# Patient Record
Sex: MALE | Race: WHITE | NOT HISPANIC OR LATINO | Employment: OTHER | URBAN - NONMETROPOLITAN AREA
[De-identification: names, ages, dates, MRNs, and addresses within clinical notes are randomized per-mention and may not be internally consistent; named-entity substitution may affect disease eponyms.]

---

## 2017-09-14 DIAGNOSIS — N40.1 BENIGN NON-NODULAR PROSTATIC HYPERPLASIA WITH LOWER URINARY TRACT SYMPTOMS: ICD-10-CM

## 2017-09-14 RX ORDER — TAMSULOSIN HYDROCHLORIDE 0.4 MG/1
CAPSULE ORAL
Qty: 30 CAPSULE | Refills: 11 | Status: SHIPPED | OUTPATIENT
Start: 2017-09-14 | End: 2017-11-21 | Stop reason: SDUPTHER

## 2017-10-25 ENCOUNTER — OFFICE VISIT (OUTPATIENT)
Dept: UROLOGY | Facility: CLINIC | Age: 67
End: 2017-10-25

## 2017-10-25 VITALS
DIASTOLIC BLOOD PRESSURE: 96 MMHG | TEMPERATURE: 98.1 F | BODY MASS INDEX: 37.8 KG/M2 | WEIGHT: 304 LBS | SYSTOLIC BLOOD PRESSURE: 152 MMHG | HEIGHT: 75 IN

## 2017-10-25 DIAGNOSIS — R35.1 NOCTURIA: ICD-10-CM

## 2017-10-25 DIAGNOSIS — N40.1 BENIGN NON-NODULAR PROSTATIC HYPERPLASIA WITH LOWER URINARY TRACT SYMPTOMS: Primary | ICD-10-CM

## 2017-10-25 LAB
BILIRUB BLD-MCNC: NEGATIVE MG/DL
CLARITY, POC: CLEAR
COLOR UR: YELLOW
GLUCOSE UR STRIP-MCNC: NEGATIVE MG/DL
KETONES UR QL: NEGATIVE
LEUKOCYTE EST, POC: NEGATIVE
NITRITE UR-MCNC: NEGATIVE MG/ML
PH UR: 6.5 [PH] (ref 5–8)
PROT UR STRIP-MCNC: NEGATIVE MG/DL
RBC # UR STRIP: NEGATIVE /UL
SP GR UR: 1.02 (ref 1–1.03)
UROBILINOGEN UR QL: NORMAL

## 2017-10-25 PROCEDURE — 99213 OFFICE O/P EST LOW 20 MIN: CPT | Performed by: UROLOGY

## 2017-10-25 PROCEDURE — 81003 URINALYSIS AUTO W/O SCOPE: CPT | Performed by: UROLOGY

## 2017-10-25 NOTE — PROGRESS NOTES
Mr. Auguste is 67 y.o. male    CHIEF COMPLAINT: I am here to follow up on my BPH.     HPI  Benign Prostatic hyperplasia  Patient was initially diagnosed with benign prostatic hyperplasia approximately 7 years ago. This was identified in the context of worsening obstructive and irritative voiding symptoms. Severity of the diease would be  Moderate now due to worsening of the nocturia mostly. . This has been managed with Alpha blockers. Alpha blockers have little to no effect on  the symptoms. His disease has been complicated by no complicating factors. His most bothersome symptom(s) is/are nocturia up to five times, intermittency, post void dribbling, and weak stream.       The following portions of the patient's history were reviewed and updated as appropriate: allergies, current medications, past family history, past medical history, past social history, past surgical history and problem list.    Review of Systems   Constitutional: Negative for chills and fever.   Gastrointestinal: Negative for abdominal pain, anal bleeding and blood in stool.   Genitourinary: Negative for flank pain and hematuria.         Current Outpatient Prescriptions:   •  Cod Liver Oil 1000 MG capsule, Take  by mouth Daily., Disp: , Rfl:   •  lisinopril (PRINIVIL,ZESTRIL) 10 MG tablet, Take 10 mg by mouth Daily., Disp: , Rfl:   •  metFORMIN (GLUCOPHAGE) 500 MG tablet, Take 500 mg by mouth 2 (Two) Times a Day With Meals., Disp: , Rfl:   •  simvastatin (ZOCOR) 20 MG tablet, Take 20 mg by mouth Every Night., Disp: , Rfl:   •  tamsulosin (FLOMAX) 0.4 MG capsule 24 hr capsule, TAKE ONE CAPSULE BY MOUTH ONCE DAILY, Disp: 30 capsule, Rfl: 11    Past Medical History:   Diagnosis Date   • Diabetes mellitus        Past Surgical History:   Procedure Laterality Date   • HERNIA REPAIR         Social History     Social History   • Marital status:      Spouse name: N/A   • Number of children: N/A   • Years of education: N/A     Social History Main  "Topics   • Smoking status: Former Smoker   • Smokeless tobacco: None   • Alcohol use No   • Drug use: None   • Sexual activity: Not Asked     Other Topics Concern   • None     Social History Narrative       Family History   Problem Relation Age of Onset   • No Known Problems Father    • No Known Problems Mother        /96  Temp 98.1 °F (36.7 °C)  Ht 75\" (190.5 cm)  Wt (!) 304 lb (138 kg)  BMI 38 kg/m2    Physical Exam  Alert and oriented ×3  Not agitated or distressed  No obvious deformities  No respiratory distress  Skin without pallor or diaphoresis  SHILPA: Benign feeling prostate without nodule approximately 30 mL in size.   Penis and testicles are normal         Results for orders placed or performed in visit on 10/25/17   POC Urinalysis Dipstick, Automated   Result Value Ref Range    Color Yellow Yellow, Straw, Dark Yellow, Amy    Clarity, UA Clear Clear    Glucose, UA Negative Negative, 1000 mg/dL (3+) mg/dL    Bilirubin Negative Negative    Ketones, UA Negative Negative    Specific Gravity  1.025 1.005 - 1.030    Blood, UA Negative Negative    pH, Urine 6.5 5.0 - 8.0    Protein, POC Negative Negative mg/dL    Urobilinogen, UA Normal Normal    Leukocytes Negative Negative    Nitrite, UA Negative Negative       Assessment and Plan  Diagnoses and all orders for this visit:    Benign non-nodular prostatic hyperplasia with lower urinary tract symptoms  -     POC Urinalysis Dipstick, Automated    Nocturia    #1. It is explained the benign prostatic hyperplasia is a chronic urologic condition.  His voiding symptoms are stable on his current regimen. He has remained infection free since his last visit. He has no evidence of progression. We discussed symptoms that would be worrisome of either of these. We talked about the importance of being seen if he develops gross hematuria, burning with urination, or episodes that may be worrisome for inability to empty the bladder. We talked about medications that may " increase the risk of urinary retention especially over the counter decongestants. This chronic issue appears stable overall.   #2. Nocturia is explained to the patient is a manifestation of one the following or a combination of voiding dysfunction, other medical conditions, or a sleep disorder.  Nocturia as a completely isolated symptommore often represents a non-urologic problem or medical condition.  It is explained that as patient's age, they often have a reverse Circadian rhythm voiding pattern in which up to two thirds of the urine in a 24-hour period can be excreted at night.  We also went over sleep disorders of the patient which may affect them.  Volume-related disorders the cause mobilization of peripheral edema are also possible causes of nocturia including the medications used to treat them.  Therefore, treatment must be directed at the cause of the symptom.  I explained we will do our best to evaluate any urologic cause of symptoms, but oftentimes we find no abnormality in voiding dysfunction to correct.  Evaluation options are explained to the patient.   #3. I am concerned that this worsening nocturia could be from obstructive sleep apnea. I told him to discuss this with Dr. Kinsey.       Camron Bowden MD  10/25/17  10:32 AM      Cc: Feliciano Kinsey MD

## 2017-11-21 ENCOUNTER — TELEPHONE (OUTPATIENT)
Dept: UROLOGY | Facility: CLINIC | Age: 67
End: 2017-11-21

## 2017-11-21 DIAGNOSIS — N40.1 BENIGN NON-NODULAR PROSTATIC HYPERPLASIA WITH LOWER URINARY TRACT SYMPTOMS: ICD-10-CM

## 2017-11-21 RX ORDER — TAMSULOSIN HYDROCHLORIDE 0.4 MG/1
1 CAPSULE ORAL DAILY
Qty: 90 CAPSULE | Refills: 3 | Status: SHIPPED | OUTPATIENT
Start: 2017-11-21 | End: 2018-11-28 | Stop reason: SDUPTHER

## 2017-11-21 NOTE — TELEPHONE ENCOUNTER
Pt has script for Tamsulosin for 30 day supply and would like it to be written for 90 day supply instead if that is possible.

## 2018-10-08 NOTE — PROGRESS NOTES
Mr. Auguste is 68 y.o. male    CHIEF COMPLAINT: I am here for follow up on BPH.     HPI  I have followed this patient for about 8 years of benign enlargement of the prostate gland. His symptom score is 8 indicating moderate severity. Quality of life assessment is rated as 2=mostly satisfied. Symptoms include weak stream, nocturia with occasional urgency and frequency. Alpha blockers have only slightly improved this.  The nocturia is the most bothersome thing to him.  He has started to use CPAP at night done somewhat better since he started this.    The following portions of the patient's history were reviewed and updated as appropriate: allergies, current medications, past family history, past medical history, past social history, past surgical history and problem list.      Review of Systems   Constitutional: Negative for chills and fever.   Gastrointestinal: Negative for abdominal pain, anal bleeding and blood in stool.   Genitourinary: Negative for dysuria, frequency, hematuria and urgency.       Current Outpatient Prescriptions:   •  Cod Liver Oil 1000 MG capsule, Take  by mouth Daily., Disp: , Rfl:   •  lisinopril (PRINIVIL,ZESTRIL) 10 MG tablet, Take 10 mg by mouth Daily., Disp: , Rfl:   •  metFORMIN (GLUCOPHAGE) 500 MG tablet, Take 500 mg by mouth 2 (Two) Times a Day With Meals., Disp: , Rfl:   •  simvastatin (ZOCOR) 20 MG tablet, Take 20 mg by mouth Every Night., Disp: , Rfl:   •  tamsulosin (FLOMAX) 0.4 MG capsule 24 hr capsule, Take 1 capsule by mouth Daily., Disp: 90 capsule, Rfl: 3    Past Medical History:   Diagnosis Date   • Diabetes mellitus (CMS/HCC)        Past Surgical History:   Procedure Laterality Date   • HERNIA REPAIR         Social History     Social History   • Marital status:      Social History Main Topics   • Smoking status: Former Smoker   • Smokeless tobacco: Never Used   • Alcohol use No   • Drug use: Unknown     Other Topics Concern   • Not on file       Family History  "  Problem Relation Age of Onset   • No Known Problems Father    • No Known Problems Mother          /70   Temp 97.2 °F (36.2 °C)   Ht 190.5 cm (75\")   Wt 134 kg (295 lb)   BMI 36.87 kg/m²       Physical Exam  Alert and oriented ×3  Not agitated or distressed  No obvious deformities  No respiratory distress  Skin without pallor or diaphoresis  SHILPA: Benign feeling prostate without nodule approximately 35 mL in size.   Penis and testicles are normal   Vital signs are reviewed      Data  Results for orders placed or performed in visit on 10/15/18   POC Urinalysis Dipstick, Multipro   Result Value Ref Range    Color Yellow Yellow, Straw, Dark Yellow, Amy    Clarity, UA Clear Clear    Glucose, UA Negative Negative, 1000 mg/dL (3+) mg/dL    Bilirubin Negative Negative    Ketones, UA Negative Negative    Specific Gravity  1.020 1.005 - 1.030    Blood, UA Negative Negative    pH, Urine 6.0 5.0 - 8.0    Protein, POC Negative Negative mg/dL    Urobilinogen, UA Normal Normal    Nitrite, UA Negative Negative    Leukocytes Negative Negative     International Prostate Symptom Score  The following is posted based on patient questionnaire answers:  0 - not at all    1-7 mild symptoms  1- Less than one time in five  8-19 moderate symptoms  2 -Less than half the time  20-35 severe symptoms  3 - About half the time  4 - More than half the time  5 - Almost always     For following sections:  Incomplete Emptying: - How often have you had the sensation  of not emptying your bladder completely after you finished urinating?  1  Frequency: -How often have you had to urinate again less than   two hours after you finished urinating?      1  Intermittency: -How often have you found you stopped and started again  Several times when you urinate?       0  Urgency: -How often do you find it difficult to postpone urination?             1  Weak stream: - How often have you had a weak urinary stream?             3  Straining: - How often " have you had to push or strain to begin  Urination?          0  Sleeping: -How many times did you most typically get up to urinate   From the time you went to bed at night until the time you got up in the   2  Morning          Total `  8    Quality of Life  How would you feel if you had to live with your urinary condition the way   2  It is now, no better, no worse for the rest of your life?    Where: 0=delighted; 1= pleased, 2= mostly satisfied, 3= mixed, 4 = mostly  Dissatisfied, 5= Unhappy, 6 = terrible    Bladder Scan interpretation  Estimation of residual urine via abdominal ultrasound  Residual Urine: 63 ml  Indication: BPH  Position: Supine  Examination: Incremental scanning of the suprapubic area using 3 MHz transducer using copious amounts of acoustic gel.   Findings: An anechoic area was demonstrated which represented the bladder, with measurement of residual urine as noted. I inspected this myself. In that the residual urine was stable or insignificant, no treatment will be necessary at this time.     Assessment and Plan  Diagnoses and all orders for this visit:    Benign non-nodular prostatic hyperplasia with lower urinary tract symptoms  -     POC Urinalysis Dipstick, Multipro    Nocturia    - It is explained the benign prostatic hyperplasia is a chronic urologic condition.  His voiding symptoms are stable on his current regimen. He has remained infection free since his last visit. He has no evidence of progression. We discussed symptoms that would be worrisome of either of these. We talked about the importance of being seen if he develops gross hematuria, burning with urination, or episodes that may be worrisome for inability to empty the bladder. We talked about medications that may increase the risk of urinary retention especially over the counter decongestants. This chronic issue appears stable overall. We will continue to monitor this with history, exam, urinalysis. Any suggestion of progression  will require further work-up.     - His nocturia is stable. Perhaps, slightly improved since using CPAP.       (Please note that portions of this note were completed with a voice recognition program.)  Camron Bowden MD  10/15/18  8:47 AM      Cc: Feliciano Kinsey MD

## 2018-10-15 ENCOUNTER — OFFICE VISIT (OUTPATIENT)
Dept: UROLOGY | Facility: CLINIC | Age: 68
End: 2018-10-15

## 2018-10-15 ENCOUNTER — OFFICE VISIT (OUTPATIENT)
Dept: RETAIL CLINIC | Facility: CLINIC | Age: 68
End: 2018-10-15

## 2018-10-15 VITALS
DIASTOLIC BLOOD PRESSURE: 70 MMHG | TEMPERATURE: 97.2 F | SYSTOLIC BLOOD PRESSURE: 130 MMHG | BODY MASS INDEX: 36.68 KG/M2 | HEIGHT: 75 IN | WEIGHT: 295 LBS

## 2018-10-15 DIAGNOSIS — R35.1 NOCTURIA: ICD-10-CM

## 2018-10-15 DIAGNOSIS — N40.1 BENIGN NON-NODULAR PROSTATIC HYPERPLASIA WITH LOWER URINARY TRACT SYMPTOMS: Primary | ICD-10-CM

## 2018-10-15 DIAGNOSIS — Z23 FLU VACCINE NEED: Primary | ICD-10-CM

## 2018-10-15 LAB
BILIRUB BLD-MCNC: NEGATIVE MG/DL
CLARITY, POC: CLEAR
COLOR UR: YELLOW
GLUCOSE UR STRIP-MCNC: NEGATIVE MG/DL
KETONES UR QL: NEGATIVE
LEUKOCYTE EST, POC: NEGATIVE
NITRITE UR-MCNC: NEGATIVE MG/ML
PH UR: 6 [PH] (ref 5–8)
PROT UR STRIP-MCNC: NEGATIVE MG/DL
RBC # UR STRIP: NEGATIVE /UL
SP GR UR: 1.02 (ref 1–1.03)
UROBILINOGEN UR QL: NORMAL

## 2018-10-15 PROCEDURE — 99213 OFFICE O/P EST LOW 20 MIN: CPT | Performed by: UROLOGY

## 2018-10-15 PROCEDURE — 81001 URINALYSIS AUTO W/SCOPE: CPT | Performed by: UROLOGY

## 2018-10-15 PROCEDURE — 51798 US URINE CAPACITY MEASURE: CPT | Performed by: UROLOGY

## 2018-10-15 NOTE — PATIENT INSTRUCTIONS

## 2018-10-15 NOTE — PROGRESS NOTES
Patient presents to clinic for seasonal influenza vaccination. There are no verbalized contraindications including allergy to eggs, latex, mercury or other flu vaccine components. Denies previous vaccine reaction, current illness or fever.      Consent discussed and signed. VIS given. Vaccination administered. Patient tolerated well. See scanned documents.     Humberto was seen today for immunizations.    Diagnoses and all orders for this visit:    Flu vaccine need    Other orders  -     Flu Vaccine High Dose PF 65YR+ ([id number])      Dorothy Gunderson, APRN

## 2018-10-31 ENCOUNTER — TELEPHONE (OUTPATIENT)
Dept: UROLOGY | Facility: CLINIC | Age: 68
End: 2018-10-31

## 2018-10-31 DIAGNOSIS — N52.9 ERECTILE DYSFUNCTION, UNSPECIFIED ERECTILE DYSFUNCTION TYPE: Primary | ICD-10-CM

## 2018-10-31 RX ORDER — SILDENAFIL CITRATE 20 MG/1
TABLET ORAL
Qty: 30 TABLET | Refills: 11 | Status: SHIPPED | OUTPATIENT
Start: 2018-10-31 | End: 2019-11-26 | Stop reason: SDUPTHER

## 2018-11-28 DIAGNOSIS — N40.1 BENIGN NON-NODULAR PROSTATIC HYPERPLASIA WITH LOWER URINARY TRACT SYMPTOMS: ICD-10-CM

## 2018-11-29 RX ORDER — TAMSULOSIN HYDROCHLORIDE 0.4 MG/1
CAPSULE ORAL
Qty: 90 CAPSULE | Refills: 3 | Status: SHIPPED | OUTPATIENT
Start: 2018-11-29 | End: 2019-12-08 | Stop reason: SDUPTHER

## 2019-01-07 ENCOUNTER — HOSPITAL ENCOUNTER (OUTPATIENT)
Dept: GENERAL RADIOLOGY | Facility: HOSPITAL | Age: 69
Discharge: HOME | End: 2019-01-07
Admitting: NURSE PRACTITIONER

## 2019-01-07 ENCOUNTER — OFFICE VISIT (OUTPATIENT)
Dept: RETAIL CLINIC | Facility: CLINIC | Age: 69
End: 2019-01-07

## 2019-01-07 VITALS
OXYGEN SATURATION: 90 % | BODY MASS INDEX: 36.18 KG/M2 | TEMPERATURE: 99.3 F | SYSTOLIC BLOOD PRESSURE: 108 MMHG | RESPIRATION RATE: 22 BRPM | WEIGHT: 291 LBS | HEIGHT: 75 IN | HEART RATE: 92 BPM | DIASTOLIC BLOOD PRESSURE: 52 MMHG

## 2019-01-07 DIAGNOSIS — R06.02 SHORTNESS OF BREATH: ICD-10-CM

## 2019-01-07 DIAGNOSIS — R09.02 HYPOXIA: ICD-10-CM

## 2019-01-07 DIAGNOSIS — I49.9 CARDIAC ARRHYTHMIA, UNSPECIFIED CARDIAC ARRHYTHMIA TYPE: Primary | ICD-10-CM

## 2019-01-07 PROCEDURE — 71046 X-RAY EXAM CHEST 2 VIEWS: CPT

## 2019-01-07 PROCEDURE — 99213 OFFICE O/P EST LOW 20 MIN: CPT | Performed by: NURSE PRACTITIONER

## 2019-01-07 RX ORDER — ALBUTEROL SULFATE 2.5 MG/3ML
2.5 SOLUTION RESPIRATORY (INHALATION) ONCE
Status: DISCONTINUED | OUTPATIENT
Start: 2019-01-07 | End: 2019-01-07

## 2019-01-07 RX ORDER — FUROSEMIDE 40 MG/1
40 TABLET ORAL DAILY PRN
COMMUNITY
Start: 2018-12-13

## 2019-01-07 NOTE — PROGRESS NOTES
Chief Complaint   Patient presents with   • Sinus Problem     Subjective   Humberto Auguste Jr. is a 68 y.o. male who presents to the clinic today with complaints sinusitis. He has been sick x 7-10 days.   Sinus Problem   This is a new problem. The current episode started in the past 7 days. The problem has been gradually worsening since onset. There has been no fever. The pain is moderate. Associated symptoms include congestion, coughing, sinus pressure and a sore throat.         Current Outpatient Medications:   •  furosemide (LASIX) 40 MG tablet, TAKE 1 TABLET BY MOUTH ONCE DAILY, Disp: , Rfl:   •  lisinopril (PRINIVIL,ZESTRIL) 10 MG tablet, Take 10 mg by mouth Daily., Disp: , Rfl:   •  metFORMIN (GLUCOPHAGE) 500 MG tablet, Take 500 mg by mouth 2 (Two) Times a Day With Meals., Disp: , Rfl:   •  simvastatin (ZOCOR) 20 MG tablet, Take 20 mg by mouth Every Night., Disp: , Rfl:   •  tamsulosin (FLOMAX) 0.4 MG capsule 24 hr capsule, TAKE ONE CAPSULE BY MOUTH ONCE DAILY, Disp: 90 capsule, Rfl: 3  •  Cod Liver Oil 1000 MG capsule, Take  by mouth., Disp: , Rfl:   •  sildenafil (REVATIO) 20 MG tablet, Take 1-4 tablets 1-2 hours before sexual activity PRN, Disp: 30 tablet, Rfl: 11    Allergies:  Patient has no known allergies.    Past Medical History:   Diagnosis Date   • Diabetes mellitus (CMS/HCC)    • Elevated cholesterol    • Hypertension    • Sleep apnea      Past Surgical History:   Procedure Laterality Date   • HERNIA REPAIR       Family History   Problem Relation Age of Onset   • No Known Problems Father    • No Known Problems Mother      Social History     Tobacco Use   • Smoking status: Former Smoker   • Smokeless tobacco: Never Used   Substance Use Topics   • Alcohol use: No   • Drug use: Not on file       Review of Systems  Review of Systems   HENT: Positive for congestion, sinus pressure and sore throat.    Respiratory: Positive for cough.        Objective   Pulse 74   Temp 99.3 °F (37.4 °C) (Tympanic)    "Resp 22   Ht 190.5 cm (75\")   Wt 132 kg (291 lb)   SpO2 (!) 88%   BMI 36.37 kg/m²       Physical Exam   Constitutional: He is oriented to person, place, and time. He appears well-developed and well-nourished.   HENT:   Head: Normocephalic and atraumatic.   Cardiovascular: An irregularly irregular rhythm present. PMI is not displaced.   Pulmonary/Chest: He is in respiratory distress (mildly short of breath).   Diminished breath sounds in bases   Neurological: He is alert and oriented to person, place, and time.   Skin: Skin is warm. He is diaphoretic.   Psychiatric: He has a normal mood and affect.   Vitals reviewed.      Assessment/Plan     Humberto was seen today for sinus problem.    Diagnoses and all orders for this visit:    Cardiac arrhythmia, unspecified cardiac arrhythmia type    Hypoxia      During our conversation Mr. Auguste became diaphoretic and short of breath. He his breath sounds were diminished. His O2 dropped to 90%. I gave him a nebulizer treatment for which he took only a few inhalations and stopped. His O2 sats dropped to 88% with irregular heart rate. At this time I ask him to go to emergency room which he refused. He said he would go to see Dr. Brandon Kinsey, his pcp. He says Dr. Brandon Kinsey has worked him up for low O2 sats and atrial fibrillation and has found nothing. I called Dr. Kinsey's office and they are unable to get him in to see anyone today and suggested he go to Urgent Care. He called his wife back to the clinic. She was shopping. She reports he has become more diaphoretic recently, but thought it was due to the cold. Explained to them both her needs further workup and this cannot be done here. He is agreeable to going to Urgent Care. He and his wife decided to complete shopping before going.       "

## 2019-11-13 NOTE — PROGRESS NOTES
Mr. Auguste is 69 y.o. male    CHIEF COMPLAINT: I am here for my yearly follow up for BPH.     Benign Prostatic Hypertrophy   This is a chronic problem. The current episode started more than 1 year ago (2000). The problem is unchanged. Irritative symptoms include frequency, nocturia (x1; CPAP has greatly improved) and urgency. Obstructive symptoms include dribbling, incomplete emptying, a slower stream, straining and a weak stream. Obstructive symptoms do not include an intermittent stream. Pertinent negatives include no chills, dysuria, hematuria, hesitancy, nausea or vomiting. AUA score is 0-7 (7/35, bother score 2.  Most bothersome symptom is weak stream.). The symptoms are aggravated by diuretics. Past treatments include tamsulosin. The treatment provided mild relief.   Erectile Dysfunction   This is a chronic problem. The current episode started more than 1 year ago. The problem is unchanged. Irritative symptoms include frequency, nocturia (x1; CPAP has greatly improved) and urgency. Obstructive symptoms include dribbling, incomplete emptying, a slower stream, straining and a weak stream. Obstructive symptoms do not include an intermittent stream. Pertinent negatives include no chills, dysuria, hematuria, hesitancy or inability to urinate. Past treatments include sildenafil. The treatment provided mild relief. Risk factors include diabetes mellitus, hypertension and tobacco use.     The following portions of the patient's history were reviewed and updated as appropriate: allergies, current medications, past family history, past medical history, past social history, past surgical history and problem list.      Review of Systems   Constitutional: Negative.  Negative for chills and fever.   Gastrointestinal: Negative for abdominal distention, abdominal pain, blood in stool, nausea and vomiting.   Genitourinary: Positive for frequency, incomplete emptying, nocturia (x1; CPAP has greatly improved) and urgency.  "Negative for difficulty urinating, dysuria, flank pain, hematuria and hesitancy.   Psychiatric/Behavioral: Negative.  Negative for agitation and confusion.         Current Outpatient Medications:   •  Cod Liver Oil 1000 MG capsule, Take  by mouth., Disp: , Rfl:   •  furosemide (LASIX) 40 MG tablet, TAKE 1 TABLET BY MOUTH ONCE DAILY, Disp: , Rfl:   •  lisinopril (PRINIVIL,ZESTRIL) 10 MG tablet, Take 10 mg by mouth Daily., Disp: , Rfl:   •  metFORMIN (GLUCOPHAGE) 500 MG tablet, Take 500 mg by mouth 2 (Two) Times a Day With Meals., Disp: , Rfl:   •  sildenafil (REVATIO) 20 MG tablet, Take 1-4 tablets 1-2 hours before sexual activity PRN, Disp: 30 tablet, Rfl: 11  •  simvastatin (ZOCOR) 20 MG tablet, Take 20 mg by mouth Every Night., Disp: , Rfl:   •  tamsulosin (FLOMAX) 0.4 MG capsule 24 hr capsule, TAKE ONE CAPSULE BY MOUTH ONCE DAILY, Disp: 90 capsule, Rfl: 3  •  montelukast (SINGULAIR) 10 MG tablet, Take 1 tablet by mouth Every Night., Disp: 30 tablet, Rfl: 0    Past Medical History:   Diagnosis Date   • Diabetes mellitus (CMS/HCC)    • Elevated cholesterol    • Hypertension    • Sleep apnea        Past Surgical History:   Procedure Laterality Date   • HERNIA REPAIR     • VARICOSE VEIN SURGERY         Social History     Socioeconomic History   • Marital status:      Spouse name: Not on file   • Number of children: Not on file   • Years of education: Not on file   • Highest education level: Not on file   Tobacco Use   • Smoking status: Former Smoker   • Smokeless tobacco: Never Used   Substance and Sexual Activity   • Alcohol use: No   • Drug use: No       Family History   Problem Relation Age of Onset   • No Known Problems Father    • No Known Problems Mother          Temp 98.6 °F (37 °C)   Ht 190.5 cm (75\")   Wt 132 kg (291 lb)   BMI 36.37 kg/m²       Physical Exam   Constitutional: He is oriented to person, place, and time. He appears well-developed and well-nourished.   HENT:   Head: Normocephalic. "   Pulmonary/Chest: Effort normal. No respiratory distress.   Abdominal: He exhibits no distension.   Genitourinary:   Genitourinary Comments: SHILPA: Prostate apex soft, no nodules, mildly enlarged   Musculoskeletal: He exhibits no edema.   Neurological: He is alert and oriented to person, place, and time.   Skin: Skin is warm and dry.   Psychiatric: He has a normal mood and affect. His behavior is normal.   Vitals reviewed.    Patient's Body mass index is 36.37 kg/m². BMI is above normal parameters. Recommendations include: educational material.    Data  Results for orders placed or performed in visit on 11/14/19   POC Urinalysis Dipstick, Multipro   Result Value Ref Range    Color Yellow Yellow, Straw, Dark Yellow, Amy    Clarity, UA Clear Clear    Glucose, UA Negative Negative, 1000 mg/dL (3+) mg/dL    Bilirubin Negative Negative    Ketones, UA Negative Negative    Specific Gravity  1.025 1.005 - 1.030    Blood, UA Negative Negative    pH, Urine 5.0 5.0 - 8.0    Protein, POC Negative Negative mg/dL    Urobilinogen, UA Normal Normal    Nitrite, UA Negative Negative    Leukocytes Negative Negative         Imaging Results (Last 7 Days)     ** No results found for the last 168 hours. **        International Prostate Symptom Score  The following is posted based on patient questionnaire answers:  0 - not at all    1-7 mild symptoms  1- Less than one time in five  8-19 moderate symptoms  2 -Less than half the time  20-35 severe symptoms  3 - About half the time  4 - More than half the time  5 - Almost always     For following sections:  Incomplete Emptying: - How often have you had the sensation  of not emptying your bladder completely after you finished urinating?  1  Frequency: -How often have you had to urinate again less than   two hours after you finished urinating?      1  Intermittency: -How often have you found you stopped and started again  Several times when you urinate?       0  Urgency: -How often do you find  it difficult to postpone urination?             1  Weak stream: - How often have you had a weak urinary stream?             2  Straining: - How often have you had to push or strain to begin  Urination?          1  Sleeping: -How many times did you most typically get up to urinate   From the time you went to bed at night until the time you got up in the   1  Morning          Total `  7    Quality of Life  How would you feel if you had to live with your urinary condition the way   7  It is now, no better, no worse for the rest of your life?    Where: 0=delighted; 1= pleased, 2= mostly satisfied, 3= mixed, 4 = mostly  Dissatisfied, 5= Unhappy, 6 = terrible    Bladder Scan interpretation  Estimation of residual urine via abdominal ultrasound  Residual Urine: 190 ml  Indication: BPH  Position: Supine  Examination: Incremental scanning of the suprapubic area using 3 MHz transducer using copious amounts of acoustic gel.   Findings: An anechoic area was demonstrated which represented the bladder, with measurement of residual urine as noted. I inspected this myself. In that the residual urine was stable or insignificant, no treatment will be necessary at this time.       Assessment and Plan  Humberto was seen today for benign prostatic hypertrophy and erectile dysfunction.    Diagnoses and all orders for this visit:    Benign non-nodular prostatic hyperplasia with lower urinary tract symptoms  -     POC Urinalysis Dipstick, Multipro    Erectile dysfunction, unspecified erectile dysfunction type  -     POC Urinalysis Dipstick, Multipro      Patient presents for follow-up of BPH and erectile dysfunction.  He is currently maintained on Flomax 0.4 mg and is tolerating this without side effects.  His postvoid residual today has increased from his previous visit from 63 mL to 190 mL.  He denies any significant changes in his voiding symptoms except near resolution of nocturia after initiating CPAP therapy.  SHILPA revealed no  significant abnormalities and patient reports his PCP will be obtaining his PSA in December.    Patient is maintained on sildenafil for erectile dysfunction and is tolerating this without adverse reactions.    As his postvoid residual has increased, I would like to see him back in 6 months for symptom check and bladder scan.  We did discuss the possibility of increasing his Flomax to 0.8 mg, however, we decided to reevaluate at follow up.    (Please note that portions of this note were completed with a voice recognition program.)  Lakesha Jorge, SULAIMAN  11/14/19  9:27 PM

## 2019-11-14 ENCOUNTER — OFFICE VISIT (OUTPATIENT)
Dept: UROLOGY | Facility: CLINIC | Age: 69
End: 2019-11-14

## 2019-11-14 VITALS — HEIGHT: 75 IN | TEMPERATURE: 98.6 F | BODY MASS INDEX: 36.18 KG/M2 | WEIGHT: 291 LBS

## 2019-11-14 DIAGNOSIS — N40.1 BENIGN NON-NODULAR PROSTATIC HYPERPLASIA WITH LOWER URINARY TRACT SYMPTOMS: Primary | ICD-10-CM

## 2019-11-14 DIAGNOSIS — N52.9 ERECTILE DYSFUNCTION, UNSPECIFIED ERECTILE DYSFUNCTION TYPE: ICD-10-CM

## 2019-11-14 LAB
BILIRUB BLD-MCNC: NEGATIVE MG/DL
CLARITY, POC: CLEAR
COLOR UR: YELLOW
GLUCOSE UR STRIP-MCNC: NEGATIVE MG/DL
KETONES UR QL: NEGATIVE
LEUKOCYTE EST, POC: NEGATIVE
NITRITE UR-MCNC: NEGATIVE MG/ML
PH UR: 5 [PH] (ref 5–8)
PROT UR STRIP-MCNC: NEGATIVE MG/DL
RBC # UR STRIP: NEGATIVE /UL
SP GR UR: 1.02 (ref 1–1.03)
UROBILINOGEN UR QL: NORMAL

## 2019-11-14 PROCEDURE — 51798 US URINE CAPACITY MEASURE: CPT | Performed by: NURSE PRACTITIONER

## 2019-11-14 PROCEDURE — 81001 URINALYSIS AUTO W/SCOPE: CPT | Performed by: NURSE PRACTITIONER

## 2019-11-14 PROCEDURE — 99213 OFFICE O/P EST LOW 20 MIN: CPT | Performed by: NURSE PRACTITIONER

## 2019-11-14 NOTE — PATIENT INSTRUCTIONS

## 2019-11-26 DIAGNOSIS — N52.9 ERECTILE DYSFUNCTION, UNSPECIFIED ERECTILE DYSFUNCTION TYPE: ICD-10-CM

## 2019-11-26 RX ORDER — SILDENAFIL CITRATE 20 MG/1
TABLET ORAL
Qty: 15 TABLET | Refills: 23 | Status: SHIPPED | OUTPATIENT
Start: 2019-11-26 | End: 2021-01-04 | Stop reason: SDUPTHER

## 2019-12-08 DIAGNOSIS — N40.1 BENIGN NON-NODULAR PROSTATIC HYPERPLASIA WITH LOWER URINARY TRACT SYMPTOMS: ICD-10-CM

## 2019-12-08 RX ORDER — TAMSULOSIN HYDROCHLORIDE 0.4 MG/1
CAPSULE ORAL
Qty: 90 CAPSULE | Refills: 3 | Status: SHIPPED | OUTPATIENT
Start: 2019-12-08 | End: 2020-12-14 | Stop reason: SDUPTHER

## 2020-01-21 ENCOUNTER — TRANSCRIBE ORDERS (OUTPATIENT)
Dept: ADMINISTRATIVE | Facility: HOSPITAL | Age: 70
End: 2020-01-21

## 2020-01-21 DIAGNOSIS — S46.011A STRAIN OF TENDON OF RIGHT ROTATOR CUFF, INITIAL ENCOUNTER: Primary | ICD-10-CM

## 2020-01-24 ENCOUNTER — HOSPITAL ENCOUNTER (OUTPATIENT)
Dept: MRI IMAGING | Facility: HOSPITAL | Age: 70
Discharge: HOME | End: 2020-01-24

## 2020-01-24 DIAGNOSIS — S46.011A STRAIN OF TENDON OF RIGHT ROTATOR CUFF, INITIAL ENCOUNTER: ICD-10-CM

## 2020-02-27 ENCOUNTER — ANESTHESIA EVENT (OUTPATIENT)
Dept: MRI IMAGING | Facility: HOSPITAL | Age: 70
End: 2020-02-27

## 2020-02-27 ENCOUNTER — ANESTHESIA (OUTPATIENT)
Dept: MRI IMAGING | Facility: HOSPITAL | Age: 70
End: 2020-02-27

## 2020-02-27 ENCOUNTER — HOSPITAL ENCOUNTER (OUTPATIENT)
Dept: MRI IMAGING | Facility: HOSPITAL | Age: 70
Discharge: HOME | End: 2020-02-27
Admitting: ANESTHESIOLOGY

## 2020-02-27 VITALS
HEART RATE: 72 BPM | HEIGHT: 75 IN | WEIGHT: 289 LBS | BODY MASS INDEX: 35.93 KG/M2 | TEMPERATURE: 98.6 F | DIASTOLIC BLOOD PRESSURE: 99 MMHG | RESPIRATION RATE: 18 BRPM | OXYGEN SATURATION: 97 % | SYSTOLIC BLOOD PRESSURE: 149 MMHG

## 2020-02-27 LAB — GLUCOSE BLDC GLUCOMTR-MCNC: 116 MG/DL (ref 70–130)

## 2020-02-27 PROCEDURE — 82962 GLUCOSE BLOOD TEST: CPT

## 2020-02-27 PROCEDURE — 73221 MRI JOINT UPR EXTREM W/O DYE: CPT

## 2020-02-27 PROCEDURE — 25010000002 PROPOFOL 10 MG/ML EMULSION: Performed by: NURSE ANESTHETIST, CERTIFIED REGISTERED

## 2020-02-27 RX ORDER — OXYCODONE AND ACETAMINOPHEN 10; 325 MG/1; MG/1
1 TABLET ORAL ONCE AS NEEDED
Status: DISCONTINUED | OUTPATIENT
Start: 2020-02-27 | End: 2020-02-28 | Stop reason: HOSPADM

## 2020-02-27 RX ORDER — SODIUM CHLORIDE 0.9 % (FLUSH) 0.9 %
3 SYRINGE (ML) INJECTION AS NEEDED
Status: CANCELLED | OUTPATIENT
Start: 2020-02-27

## 2020-02-27 RX ORDER — LIDOCAINE HYDROCHLORIDE 10 MG/ML
0.5 INJECTION, SOLUTION EPIDURAL; INFILTRATION; INTRACAUDAL; PERINEURAL ONCE AS NEEDED
Status: CANCELLED | OUTPATIENT
Start: 2020-02-27

## 2020-02-27 RX ORDER — NALOXONE HCL 0.4 MG/ML
0.4 VIAL (ML) INJECTION AS NEEDED
Status: DISCONTINUED | OUTPATIENT
Start: 2020-02-27 | End: 2020-02-28 | Stop reason: HOSPADM

## 2020-02-27 RX ORDER — IPRATROPIUM BROMIDE AND ALBUTEROL SULFATE 2.5; .5 MG/3ML; MG/3ML
3 SOLUTION RESPIRATORY (INHALATION) ONCE AS NEEDED
Status: DISCONTINUED | OUTPATIENT
Start: 2020-02-27 | End: 2020-02-28 | Stop reason: HOSPADM

## 2020-02-27 RX ORDER — FLUMAZENIL 0.1 MG/ML
0.2 INJECTION INTRAVENOUS AS NEEDED
Status: DISCONTINUED | OUTPATIENT
Start: 2020-02-27 | End: 2020-02-28 | Stop reason: HOSPADM

## 2020-02-27 RX ORDER — SODIUM CHLORIDE, SODIUM LACTATE, POTASSIUM CHLORIDE, CALCIUM CHLORIDE 600; 310; 30; 20 MG/100ML; MG/100ML; MG/100ML; MG/100ML
1000 INJECTION, SOLUTION INTRAVENOUS CONTINUOUS
Status: CANCELLED | OUTPATIENT
Start: 2020-02-27

## 2020-02-27 RX ORDER — OXYCODONE AND ACETAMINOPHEN 7.5; 325 MG/1; MG/1
2 TABLET ORAL EVERY 4 HOURS PRN
Status: DISCONTINUED | OUTPATIENT
Start: 2020-02-27 | End: 2020-02-28 | Stop reason: HOSPADM

## 2020-02-27 RX ORDER — LABETALOL HYDROCHLORIDE 5 MG/ML
5 INJECTION, SOLUTION INTRAVENOUS
Status: DISCONTINUED | OUTPATIENT
Start: 2020-02-27 | End: 2020-02-28 | Stop reason: HOSPADM

## 2020-02-27 RX ORDER — IBUPROFEN 400 MG/1
600 TABLET ORAL ONCE AS NEEDED
Status: DISCONTINUED | OUTPATIENT
Start: 2020-02-27 | End: 2020-02-28 | Stop reason: HOSPADM

## 2020-02-27 RX ORDER — PROPOFOL 10 MG/ML
VIAL (ML) INTRAVENOUS AS NEEDED
Status: DISCONTINUED | OUTPATIENT
Start: 2020-02-27 | End: 2020-02-27 | Stop reason: SURG

## 2020-02-27 RX ORDER — FENTANYL CITRATE 50 UG/ML
25 INJECTION, SOLUTION INTRAMUSCULAR; INTRAVENOUS
Status: DISCONTINUED | OUTPATIENT
Start: 2020-02-27 | End: 2020-02-28 | Stop reason: HOSPADM

## 2020-02-27 RX ORDER — ONDANSETRON 2 MG/ML
4 INJECTION INTRAMUSCULAR; INTRAVENOUS ONCE AS NEEDED
Status: DISCONTINUED | OUTPATIENT
Start: 2020-02-27 | End: 2020-02-28 | Stop reason: HOSPADM

## 2020-02-27 RX ORDER — SODIUM CHLORIDE, SODIUM LACTATE, POTASSIUM CHLORIDE, CALCIUM CHLORIDE 600; 310; 30; 20 MG/100ML; MG/100ML; MG/100ML; MG/100ML
INJECTION, SOLUTION INTRAVENOUS CONTINUOUS PRN
Status: DISCONTINUED | OUTPATIENT
Start: 2020-02-27 | End: 2020-02-27 | Stop reason: SURG

## 2020-02-27 RX ADMIN — SODIUM CHLORIDE, POTASSIUM CHLORIDE, SODIUM LACTATE AND CALCIUM CHLORIDE: 600; 310; 30; 20 INJECTION, SOLUTION INTRAVENOUS at 09:16

## 2020-02-27 RX ADMIN — PROPOFOL 200 MG: 10 INJECTION, EMULSION INTRAVENOUS at 09:25

## 2020-02-27 NOTE — ANESTHESIA PROCEDURE NOTES
Airway  Urgency: elective    Date/Time: 2/27/2020 9:25 AM  Airway not difficult    General Information and Staff    Patient location during procedure: OR  CRNA: Josh Selby CRNA    Indications and Patient Condition    Preoxygenated: yes  Mask difficulty assessment: 0 - not attempted    Final Airway Details  Final airway type: supraglottic airway      Successful airway: I-gel  Size 4    Number of attempts at approach: 1  Assessment: lips, teeth, and gum same as pre-op

## 2020-02-27 NOTE — ANESTHESIA POSTPROCEDURE EVALUATION
"Patient: Humberto Auguste Jr.    Procedure Summary     Date:  02/27/20 Room / Location:  [facility]    Anesthesia Start:  0920 Anesthesia Stop:  1008    Procedure:  MRI SHOULDER RIGHT WO CONTRAST Diagnosis:  Strain of tendon of right rotator cuff, initial encounter    Scheduled Providers:   Provider:  Josh Selby CRNA    Anesthesia Type:  general ASA Status:  3          Anesthesia Type: general    Vitals  Vitals Value Taken Time   /84 2/27/2020 10:18 AM   Temp 98.6 °F (37 °C) 2/27/2020 10:15 AM   Pulse 82 2/27/2020 10:21 AM   Resp 18 2/27/2020 10:15 AM   SpO2 92 % 2/27/2020 10:21 AM   Vitals shown include unvalidated device data.        Post Anesthesia Care and Evaluation    Patient location during evaluation: PACU  Patient participation: complete - patient participated  Level of consciousness: awake and alert  Pain management: adequate  Airway patency: patent  Anesthetic complications: No anesthetic complications    Cardiovascular status: acceptable  Respiratory status: acceptable  Hydration status: acceptable    Comments: Blood pressure 142/67, pulse 71, temperature 98.6 °F (37 °C), temperature source Temporal, resp. rate 12, height 190.5 cm (75\"), weight 131 kg (289 lb), SpO2 97 %.    Pt discharged from PACU based on goyo score >8      "

## 2020-02-27 NOTE — ANESTHESIA PREPROCEDURE EVALUATION
Anesthesia Evaluation     Patient summary reviewed and Nursing notes reviewed   no history of anesthetic complications:  NPO Solid Status: > 8 hours  NPO Liquid Status: > 8 hours           Airway   Mallampati: II  Large neck circumference and Possible difficult intubation  Dental - normal exam     Pulmonary    (+) sleep apnea on CPAP,   (-) COPD, asthma, not a smoker  Cardiovascular   Exercise tolerance: good (4-7 METS)    ECG reviewed    (+) hypertension, hyperlipidemia,   (-) past MI, CAD, CABG      Neuro/Psych- negative ROS  (-) TIA, CVA  GI/Hepatic/Renal/Endo    (+)   diabetes mellitus type 2,   (-) liver disease, no renal disease    Musculoskeletal (-) negative ROS    Abdominal    Substance History - negative use     OB/GYN negative ob/gyn ROS         Other                        Anesthesia Plan    ASA 3     general     intravenous induction     Anesthetic plan, all risks, benefits, and alternatives have been provided, discussed and informed consent has been obtained with: patient.

## 2020-03-16 PROBLEM — S46.011A TRAUMATIC ROTATOR CUFF TEAR, RIGHT, INITIAL ENCOUNTER: Status: ACTIVE | Noted: 2020-03-16

## 2020-03-16 NOTE — PAT
Per PAT call, pt instructed NOT take Revatio @ least 24 hours before surgery; stated he understood.        Evonne Gomez RN

## 2020-03-16 NOTE — OP NOTE
Patient Name: Felicitas  : 1950  MRN: [id number]    DATE of SURGERY: 3/17/2020    SURGEON: Alphonso Lundberg MD    ASSISTANT: NONE    PREOPERATIVE DIAGNOSIS:  1) Acute traumatic complete rotator cuff tear of the Right shoulder  2) Subacromial impingement syndrome, Right Shoulder     POSTOPERATIVE DIAGNOSIS:  1) Acute traumatic complete rotator cuff tear of the Right shoulder  2) Subacromial impingement syndrome, Right Shoulder     PROCEDURE PERFORMED:  1) Right Shoulder arthroscopic rotator cuff repair   2) Right Shoulder arthroscopic subacromial decompression    IMPLANTS: Arthrex 4.75 mm bioswivelock anchors x 2    ANESTHESIA USED: General endotrachial anesthesia, interscalene block    ESTIMATED BLOOD LOSS: minimal    DRAINS: none     COMPLICATIONS: none    SPECIMENS: none    OPERATIVE INDICATIONS: This patient is a 69 y.o. male presented to my clinic with complaints of progressive shoulder pain after a traumatic injury to the shoulder that occurred during a [place] his [place] wet concrete on 19. An MRI was obtained which showed the above named diagnoses. Pain was not relieved with conservative treatment consisting of anti-inflammatories, corticosteroid injections, physical therapy.  Due to progressive pain and loss of function, surgical evaluation was discussed and the patient wished to proceed understanding risks, benefits, and alternatives. The surgical indication were to relieve pain, improve function, and prevent future disability in regards to the shoulder pathology dictated in the aboved diagnoses.  Risks included, but were not limited to, that of anesthesia, bleeding, infection, pain, damage to local structures, need for further surgery, failure of repair, stiffness, failure of implants, and loss of function.      OPERATIVE FINDINGS:  1) Exam under anesthesia:  Full passive ROM, joint stable without laxity, skin intact  2) Glenohumeral Joint:       A) Chondral surfaces: Intact         B)  Labrum: Intact without tear            C) Biceps:  Intact without tear        D) Rotator Cuff: Complete full thickness tear supraspinatus with disruption of the rotator cable, retraction to the mid-humeral head, bone good, tissue with tendinosis, adequate excursion  3) Subacromial space:         A) Large amount of dense vascular bursa         B) Type 3 acromium, hypertrophic coracoacromial ligament         C) Bursal surface rotator cuff:  Complete tear as above         D) AC joint:  Intact without arthritic change     PROCEDURE in DETAIL:  The patient was seen in the preoperative holding room, once again the informed consent was reviewed with the patient and signed.  The site of surgery was marked with the patient's agreement.  After being transported to the operating room, a timeout was performed identifying the correct patient as well as the operative site.  Dose appropriate IV antibiotics were given prior to incision.  The patient was positioned in the beach chair position, all bony prominences were protected and a sterile prep and drape was performed.  A standard posterior arthroscopy portal was established and an outside-in technique was utilized to establish an anterior portal within the rotator interval.  An arthroscopic probe was inserted and a thorough exam of the glenohumeral joint was performed.    Attention was then turned to the intraarticular portion of the rotator cuff.  A probe was used to identify a complete tear of the rotator cuff and a shaver was used to debride the tendon.    The arthroscope was then transitioned to the subacromial space and an outside in technique was used to establish a lateral portal.  The arthroscopic shaver was introduced in the subacromial space and a complete bursectomy was performed with this device.     Attention was turned to the anterior and lateral edge of the acromium where soft tissue was removed with a cautery device.  Due to a prominent acromial spur causing  impingement, an acromioplasty was performed with an arthroscopic khris converting this to a flat type 1 morphology.  The coracoaromial ligament was incised with a cautery device completing the subacromial decompression.    Attention was then turned to the rotator cuff tear which was visualized from the intraarticular space.  The tendon and greater tuberosity footprint were debrided followed by shuttling fibertape and fiberwire suture into the tear site in a horizontal mattress fashion with a suture passing device after a medial row anchor was placed.  Fixation was then performed to the greater tuberosity footprint with an additional 4.75 mm bioswivelock anchor in knotless fashion. The repair appeared to be anatomic.    Portals were then closed with monocryl and a sterile dressing was applied followed by a sling.  The patient was awakened from anesthesia, transported to the recovery room in stable condition.    POSTOP PLAN:    1) Discharge home with family  2) Follow up in 1 week for a clinical check  3) Follow the following protocol: Small/Medium RCR      Electronically signed by Alphonso Lundberg MD on 3/17/2020 at 17:06

## 2020-03-16 NOTE — DISCHARGE INSTRUCTIONS
YOUR NEXT PAIN MEDICATION IS DUE AT______________         General Anesthesia, Adult, Care After  Refer to this sheet in the next few weeks. These instructions provide you with information on caring for yourself after your procedure. Your health care provider may also give you more specific instructions. Your treatment has been planned according to current medical practices, but problems sometimes occur. Call your health care provider if you have any problems or questions after your procedure.  WHAT TO EXPECT AFTER THE PROCEDURE  After the procedure, it is typical to experience:  · Sleepiness.  · Nausea and vomiting.  HOME CARE INSTRUCTIONS  · For the first 24 hours after general anesthesia:  ¨ Have a responsible person with you.  ¨ Do not drive a car. If you are alone, do not take public transportation.  ¨ Do not drink alcohol.  ¨ Do not take medicine that has not been prescribed by your health care provider.  ¨ Do not sign important papers or make important decisions.  ¨ You may resume a normal diet and activities as directed by your health care provider.  · Change bandages (dressings) as directed.  · If you have questions or problems that seem related to general anesthesia, call the hospital and ask for the anesthetist or anesthesiologist on call.  SEEK MEDICAL CARE IF:  · You have nausea and vomiting that continue the day after anesthesia.  · You develop a rash.  SEEK IMMEDIATE MEDICAL CARE IF:    · You have difficulty breathing.  · You have chest pain.  · You have any allergic problems.     This information is not intended to replace advice given to you by your health care provider. Make sure you discuss any questions you have with your health care provider.     Document Released: 03/26/2002 Document Revised: 01/08/2016 Document Reviewed: 07/03/2014  [employer] Interactive Patient Education ©2016 [employer]    CALL YOUR PHYSICIAN IF YOU EXPERIENCE  INCREASED PAIN NOT HELPED BY YOUR PAIN MEDICATION.    .                                               Fall Prevention in the Home      Falls can cause injuries. They can happen to people of all ages. There are many things you can do to make your home safe and to help prevent falls.    WHAT CAN I DO ON THE OUTSIDE OF MY HOME?  · Regularly fix the edges of walkways and driveways and fix any cracks.  · Remove anything that might make you trip as you walk through a door, such as a raised step or threshold.  · Trim any bushes or trees on the path to your home.  · Use bright outdoor lighting.  · Clear any walking paths of anything that might make someone trip, such as rocks or tools.  · Regularly check to see if handrails are loose or broken. Make sure that both sides of any steps have handrails.  · Any raised decks and porches should have guardrails on the edges.  · Have any leaves, snow, or ice cleared regularly.  · Use sand or salt on walking paths during winter.  · Clean up any spills in your garage right away. This includes oil or grease spills.  WHAT CAN I DO IN THE BATHROOM?    · Use night lights.  · Install grab bars by the toilet and in the tub and shower. Do not use towel bars as grab bars.  · Use non-skid mats or decals in the tub or shower.  · If you need to sit down in the shower, use a plastic, non-slip stool.  · Keep the floor dry. Clean up any water that spills on the floor as soon as it happens.  · Remove soap buildup in the tub or shower regularly.  · Attach bath mats securely with double-sided non-slip rug tape.  · Do not have throw rugs and other things on the floor that can make you trip.  WHAT CAN I DO IN THE BEDROOM?  · Use night lights.  · Make sure that you have a light by your bed that is easy to reach.  · Do not use any sheets or blankets that are too big for your bed. They should not hang down onto the floor.  · Have a firm chair that has side arms. You can use this for support while you get dressed.  · Do not have throw rugs and other things on the floor  that can make you trip.  WHAT CAN I DO IN THE KITCHEN?  · Clean up any spills right away.  · Avoid walking on wet floors.  · Keep items that you use a lot in easy-to-reach places.  · If you need to reach something above you, use a strong step stool that has a grab bar.  · Keep electrical cords out of the way.  · Do not use floor polish or wax that makes floors slippery. If you must use wax, use non-skid floor wax.  · Do not have throw rugs and other things on the floor that can make you trip.  WHAT CAN I DO WITH MY STAIRS?  · Do not leave any items on the stairs.  · Make sure that there are handrails on both sides of the stairs and use them. Fix handrails that are broken or loose. Make sure that handrails are as long as the stairways.  · Check any carpeting to make sure that it is firmly attached to the stairs. Fix any carpet that is loose or worn.  · Avoid having throw rugs at the top or bottom of the stairs. If you do have throw rugs, attach them to the floor with carpet tape.  · Make sure that you have a light switch at the top of the stairs and the bottom of the stairs. If you do not have them, ask someone to add them for you.  WHAT ELSE CAN I DO TO HELP PREVENT FALLS?  · Wear shoes that:  ¨ Do not have high heels.  ¨ Have rubber bottoms.  ¨ Are comfortable and fit you well.  ¨ Are closed at the toe. Do not wear sandals.  · If you use a stepladder:  ¨ Make sure that it is fully opened. Do not climb a closed stepladder.  ¨ Make sure that both sides of the stepladder are locked into place.  ¨ Ask someone to hold it for you, if possible.  · Clearly marii and make sure that you can see:  ¨ Any grab bars or handrails.  ¨ First and last steps.  ¨ Where the edge of each step is.  · Use tools that help you move around (mobility aids) if they are needed. These include:  ¨ Canes.  ¨ Walkers.  ¨ Scooters.  ¨ Crutches.  · Turn on the lights when you go into a dark area. Replace any light bulbs as soon as they burn  out.  · Set up your furniture so you have a clear path. Avoid moving your furniture around.  · If any of your floors are uneven, fix them.  · If there are any pets around you, be aware of where they are.  · Review your medicines with your doctor. Some medicines can make you feel dizzy. This can increase your chance of falling.  Ask your doctor what other things that you can do to help prevent falls.     This information is not intended to replace advice given to you by your health care provider. Make sure you discuss any questions you have with your health care provider.     Document Released: 10/14/2010 Document Revised: 05/03/2016 Document Reviewed: 01/22/2016  [employer] Interactive Patient Education ©2016 [employer]     PATIENT/FAMILY/RESPONSIBLE PARTY VERBALIZES UNDERSTANDING OF ABOVE EDUCATION.  COPY OF PAIN SCALE GIVEN AND REVIEWED WITH VERBALIZED UNDERSTANDING.What to expect after a Nerve Block  Nerve blocks administered to block pain affect many types of nerves, including those nerves that control movement, pain, and normal sensation.  Following a nerve block, you may notice some bruising at the site where the block was given.  You may experience sensations such as:  numbness of the affected area or limb, tingling, heaviness (that is the limb feels heavy to you), weakness or inability to move the affected arm or leg, or a feeling as if your arm or leg has “fallen asleep”.    A nerve block can last from 9-18 hours depending on the medications used.  Usually the weakness wears off first followed by the tingling and heaviness.  As the block wears off, you may begin to notice pain; however, this sequence of events may occur in any order.  Typically, you will be able to move your limb before you will feel it.  Once a nerve block begins to wear off, the effects are usually completely gone within 60 minutes.    If you experience continued side effects that you believe are block related for longer than 48 hours,  please call your healthcare provider.  Please see block-specific instructions below.    Instructions for any block involving the shoulder or arm  • If you have had any kind of shoulder/arm block, you will go home with your arm in a sling.  Wear the sling until the block has completely worn off.  You may be required to wear it for a longer period of time per your surgeon’s recommendations.  • I you have had a shoulder/arm block; it is a good idea to sleep on a recliner with pillows under your arm.    Note:  If you have severe or prolonged shortness of breath, please seek medical assistance as soon as possible.    Protection of a “blocked” arm (limb)  • After a nerve block, you cannot feel pain, pressure, or extremes of temperature in the affected limb.  And because of this, your blocked limb is at more risk for injury.  For example, it is possible to burn your limb on an extremely hot surface without feeling it.  • When resting, it is important to reposition your limb periodically to avoid prolonged pressure on it.  This may require the use of pillows and padding.  • While sleeping, you should avoid rolling onto the affected limb or putting too much pressure on it.  • If you have a cast or tight dressing, check the color of your fingers of the affected limb.  Call your surgeon if they look discolored (that is, dusky, dark colored)  • Use caution in cold weather.  Cover your limb appropriately to protect it from the cold.  Pain Management  Your surgeon will give you a prescription for pain medication.  Begin taking this before the nerve block wears off.  Bear in mind that sometimes the block can wear off in the middle of the night.                            UPPER EXTREMITY POST-OP INSTRUCTIONS - DR. WALSH    IMPORTANT PHONE NUMBERS:  • For emergencies, please call [phone]  • You may reach Dr. Walsh and clinical staff at [phone]- M-F 8:00 am-5:00 pm  • After 5pm or on the weekends, please call [phone]  • Call  immediately if you have any of the following symptoms:     Elevated temperature above 101.5 degrees for more than 48 hours after surgery     Persistent drainage from wound     Severe pain around surgical site    Sling use: The sling is provided for your comfort and to ensure proper healing of your repair following surgery. Please place the abduction pillow with the curved side against your side and the sling on the side of the pillow. Your surgery requires that you wear the sling if noted below.  ____ For comfort. Remove sling 24 hours and begin range of motion exercises  __X__ At all times except bathing, dressing, and therapy. Also wear the sling during sleep.  ____ No sling required    Bathing:  ___No bandages, no restrictions!!  _X_You may remove you dressing and shower on the 3rd day after surgery (Ex. Tues surgery, shower on Friday)  ** if you are told to it is ok to remove your dressing and shower, DO NOT SOAK your incisions in a tub.  ___Keep splint clean, dry, and intact. DO NOT place foreign objects into your splint.      Dressings: Keep dressing/splint intact unless instructed otherwise below. SOME DRAINAGE IS NORMAL!    • DO NOT touch or apply ointment to the incision.    • DO NOT remove the steri-strips over the incisions (if you have steri-strips). They will         generally fall off on their own or can be removed 1 weeksafter surgery.    • If you have yellow gauze and it comes off, do not worry about it. Leave them off.   • Signs of infection that warrant a phone call to our clinical line:     o Excessive drainage or redness     o Red streaking coming away from the incision  o Increased pain  o Increased temperature above 101 degrees      Physical Therapy:        *  Your physical therapy status will be discussed with you postoperatively and at your first post-op appointment. Some injuries will not require physical therapy.      *  If you have a shoulder manipulation, please schedule therapy for the  next day      Medications: You will be discharged with the appropriate medications following your surgery. Fill these at the pharmacy and take them as directed on the label. Not all of the medications below may be prescribed. Occasionally, other medications may be prescribed with specific instructions.    Percocet/Lortab (oxycodone/hydrocodone with tylenol) - Pain Medication, will cause drowsiness, possibly itchiness (this is NOT an allergy - use benadryl or an over the counter allergy medication such as Claritin or Zyrtec)     o Take 1-2 tablets every 4-6 hours. DO NOT EXCEED 4,000mg of Tylenol in 24 hours.  **DO NOT MIX WITH ALCOHOL, DRIVE WHILE TAKING, OR TAKE with extra TYLENOL**    Colace (Docusate) - stool softener, used for constipation. Take this only if you feel constipated.      Zofran (Ondansetron) or Phenergan - Anti-nausea medication, will cause drowsiness      **If you are running low on pain medications, please notify us if you need a refill 24-48 hours prior to when you run out, so we can make arrangements to refill the prescription for you if we determine is necessary

## 2020-03-17 ENCOUNTER — ANESTHESIA (OUTPATIENT)
Dept: PERIOP | Facility: HOSPITAL | Age: 70
End: 2020-03-17

## 2020-03-17 ENCOUNTER — ANESTHESIA EVENT (OUTPATIENT)
Dept: PERIOP | Facility: HOSPITAL | Age: 70
End: 2020-03-17

## 2020-03-17 ENCOUNTER — HOSPITAL ENCOUNTER (OUTPATIENT)
Facility: HOSPITAL | Age: 70
Setting detail: HOSPITAL OUTPATIENT SURGERY
Discharge: HOME | End: 2020-03-17
Attending: ORTHOPAEDIC SURGERY | Admitting: ORTHOPAEDIC SURGERY

## 2020-03-17 VITALS
SYSTOLIC BLOOD PRESSURE: 141 MMHG | WEIGHT: 292.11 LBS | TEMPERATURE: 97.4 F | RESPIRATION RATE: 16 BRPM | HEIGHT: 73 IN | OXYGEN SATURATION: 95 % | HEART RATE: 85 BPM | DIASTOLIC BLOOD PRESSURE: 74 MMHG | BODY MASS INDEX: 38.71 KG/M2

## 2020-03-17 DIAGNOSIS — S46.011A TRAUMATIC ROTATOR CUFF TEAR, RIGHT, INITIAL ENCOUNTER: Primary | ICD-10-CM

## 2020-03-17 LAB
ANION GAP SERPL CALCULATED.3IONS-SCNC: 12 MMOL/L (ref 5–15)
BUN BLD-MCNC: 22 MG/DL (ref 8–23)
BUN/CREAT SERPL: 24.2 (ref 7–25)
CALCIUM SPEC-SCNC: 9 MG/DL (ref 8.6–10.5)
CHLORIDE SERPL-SCNC: 103 MMOL/L (ref 98–107)
CO2 SERPL-SCNC: 28 MMOL/L (ref 22–29)
CREAT BLD-MCNC: 0.91 MG/DL (ref 0.76–1.27)
DEPRECATED RDW RBC AUTO: 42.4 FL (ref 37–54)
ERYTHROCYTE [DISTWIDTH] IN BLOOD BY AUTOMATED COUNT: 13.1 % (ref 12.3–15.4)
GFR SERPL CREATININE-BSD FRML MDRD: 83 ML/MIN/1.73
GLUCOSE BLD-MCNC: 154 MG/DL (ref 65–99)
GLUCOSE BLDC GLUCOMTR-MCNC: 114 MG/DL (ref 70–130)
GLUCOSE BLDC GLUCOMTR-MCNC: 139 MG/DL (ref 70–130)
HCT VFR BLD AUTO: 40.7 % (ref 37.5–51)
HGB BLD-MCNC: 13.3 G/DL (ref 13–17.7)
MCH RBC QN AUTO: 29.1 PG (ref 26.6–33)
MCHC RBC AUTO-ENTMCNC: 32.7 G/DL (ref 31.5–35.7)
MCV RBC AUTO: 89.1 FL (ref 79–97)
PLATELET # BLD AUTO: 175 10*3/MM3 (ref 140–450)
PMV BLD AUTO: 10.9 FL (ref 6–12)
POTASSIUM BLD-SCNC: 4.2 MMOL/L (ref 3.5–5.2)
RBC # BLD AUTO: 4.57 10*6/MM3 (ref 4.14–5.8)
SODIUM BLD-SCNC: 143 MMOL/L (ref 136–145)
WBC NRBC COR # BLD: 5.35 10*3/MM3 (ref 3.4–10.8)

## 2020-03-17 PROCEDURE — 82962 GLUCOSE BLOOD TEST: CPT

## 2020-03-17 PROCEDURE — C1755 CATHETER, INTRASPINAL: HCPCS | Performed by: NURSE ANESTHETIST, CERTIFIED REGISTERED

## 2020-03-17 PROCEDURE — 25010000002 DEXAMETHASONE PER 1 MG: Performed by: NURSE ANESTHETIST, CERTIFIED REGISTERED

## 2020-03-17 PROCEDURE — 25010000002 ONDANSETRON PER 1 MG: Performed by: ANESTHESIOLOGY

## 2020-03-17 PROCEDURE — 85027 COMPLETE CBC AUTOMATED: CPT | Performed by: ORTHOPAEDIC SURGERY

## 2020-03-17 PROCEDURE — 25010000002 ROPIVACAINE PER 1 MG: Performed by: NURSE ANESTHETIST, CERTIFIED REGISTERED

## 2020-03-17 PROCEDURE — 25010000002 FENTANYL CITRATE (PF) 100 MCG/2ML SOLUTION: Performed by: ANESTHESIOLOGY

## 2020-03-17 PROCEDURE — 93005 ELECTROCARDIOGRAM TRACING: CPT | Performed by: ORTHOPAEDIC SURGERY

## 2020-03-17 PROCEDURE — 93010 ELECTROCARDIOGRAM REPORT: CPT | Performed by: INTERNAL MEDICINE

## 2020-03-17 PROCEDURE — C1713 ANCHOR/SCREW BN/BN,TIS/BN: HCPCS | Performed by: ORTHOPAEDIC SURGERY

## 2020-03-17 PROCEDURE — 25010000002 PROPOFOL 10 MG/ML EMULSION: Performed by: NURSE ANESTHETIST, CERTIFIED REGISTERED

## 2020-03-17 PROCEDURE — 25010000002 EPINEPHRINE PER 0.1 MG: Performed by: ORTHOPAEDIC SURGERY

## 2020-03-17 PROCEDURE — 25010000002 SUCCINYLCHOLINE PER 20 MG: Performed by: NURSE ANESTHETIST, CERTIFIED REGISTERED

## 2020-03-17 PROCEDURE — 25010000002 NEOSTIGMINE 10 MG/10ML SOLUTION: Performed by: NURSE ANESTHETIST, CERTIFIED REGISTERED

## 2020-03-17 PROCEDURE — 25010000002 MIDAZOLAM PER 1 MG: Performed by: ANESTHESIOLOGY

## 2020-03-17 PROCEDURE — 25010000002 ONDANSETRON PER 1 MG: Performed by: NURSE ANESTHETIST, CERTIFIED REGISTERED

## 2020-03-17 PROCEDURE — 80048 BASIC METABOLIC PNL TOTAL CA: CPT | Performed by: ORTHOPAEDIC SURGERY

## 2020-03-17 DEVICE — SUT/ANCH BIOCOMP SWIVELOCK/C 4.75X19.1MM: Type: IMPLANTABLE DEVICE | Site: SHOULDER | Status: FUNCTIONAL

## 2020-03-17 DEVICE — SUT TIGERLOOP 2 STR 20IN TW 7MMLP AR7234T: Type: IMPLANTABLE DEVICE | Site: SHOULDER | Status: FUNCTIONAL

## 2020-03-17 DEVICE — SUT FIBERTAPE TW 2 7IN WHT/BLK AR72377T: Type: IMPLANTABLE DEVICE | Site: SHOULDER | Status: FUNCTIONAL

## 2020-03-17 RX ORDER — MAGNESIUM HYDROXIDE 1200 MG/15ML
LIQUID ORAL AS NEEDED
Status: DISCONTINUED | OUTPATIENT
Start: 2020-03-17 | End: 2020-03-17 | Stop reason: HOSPADM

## 2020-03-17 RX ORDER — SODIUM CHLORIDE, SODIUM LACTATE, POTASSIUM CHLORIDE, CALCIUM CHLORIDE 600; 310; 30; 20 MG/100ML; MG/100ML; MG/100ML; MG/100ML
100 INJECTION, SOLUTION INTRAVENOUS CONTINUOUS
Status: DISCONTINUED | OUTPATIENT
Start: 2020-03-17 | End: 2020-03-17 | Stop reason: HOSPADM

## 2020-03-17 RX ORDER — CEFAZOLIN SODIUM IN 0.9 % NACL 3 G/100 ML
3 INTRAVENOUS SOLUTION, PIGGYBACK (ML) INTRAVENOUS ONCE
Status: COMPLETED | OUTPATIENT
Start: 2020-03-17 | End: 2020-03-17

## 2020-03-17 RX ORDER — SUCCINYLCHOLINE CHLORIDE 20 MG/ML
INJECTION INTRAMUSCULAR; INTRAVENOUS AS NEEDED
Status: DISCONTINUED | OUTPATIENT
Start: 2020-03-17 | End: 2020-03-17 | Stop reason: SURG

## 2020-03-17 RX ORDER — OXYCODONE AND ACETAMINOPHEN 10; 325 MG/1; MG/1
TABLET ORAL
Qty: 25 TABLET | Refills: 0 | Status: ON HOLD | OUTPATIENT
Start: 2020-03-17 | End: 2021-06-08

## 2020-03-17 RX ORDER — LIDOCAINE HYDROCHLORIDE 10 MG/ML
0.5 INJECTION, SOLUTION EPIDURAL; INFILTRATION; INTRACAUDAL; PERINEURAL ONCE AS NEEDED
Status: DISCONTINUED | OUTPATIENT
Start: 2020-03-17 | End: 2020-03-17 | Stop reason: HOSPADM

## 2020-03-17 RX ORDER — FENTANYL CITRATE 50 UG/ML
25 INJECTION, SOLUTION INTRAMUSCULAR; INTRAVENOUS
Status: DISCONTINUED | OUTPATIENT
Start: 2020-03-17 | End: 2020-03-17 | Stop reason: HOSPADM

## 2020-03-17 RX ORDER — ONDANSETRON 2 MG/ML
4 INJECTION INTRAMUSCULAR; INTRAVENOUS ONCE AS NEEDED
Status: COMPLETED | OUTPATIENT
Start: 2020-03-17 | End: 2020-03-17

## 2020-03-17 RX ORDER — FLUMAZENIL 0.1 MG/ML
0.2 INJECTION INTRAVENOUS AS NEEDED
Status: DISCONTINUED | OUTPATIENT
Start: 2020-03-17 | End: 2020-03-17 | Stop reason: HOSPADM

## 2020-03-17 RX ORDER — NEOSTIGMINE METHYLSULFATE 1 MG/ML
INJECTION, SOLUTION INTRAVENOUS AS NEEDED
Status: DISCONTINUED | OUTPATIENT
Start: 2020-03-17 | End: 2020-03-17 | Stop reason: SURG

## 2020-03-17 RX ORDER — ONDANSETRON 2 MG/ML
4 INJECTION INTRAMUSCULAR; INTRAVENOUS ONCE AS NEEDED
Status: DISCONTINUED | OUTPATIENT
Start: 2020-03-17 | End: 2020-03-17 | Stop reason: HOSPADM

## 2020-03-17 RX ORDER — ONDANSETRON 4 MG/1
4 TABLET, FILM COATED ORAL EVERY 8 HOURS PRN
Qty: 10 TABLET | Refills: 0 | Status: ON HOLD | OUTPATIENT
Start: 2020-03-17 | End: 2021-06-08

## 2020-03-17 RX ORDER — SODIUM CHLORIDE 0.9 % (FLUSH) 0.9 %
3 SYRINGE (ML) INJECTION EVERY 12 HOURS SCHEDULED
Status: DISCONTINUED | OUTPATIENT
Start: 2020-03-17 | End: 2020-03-17 | Stop reason: HOSPADM

## 2020-03-17 RX ORDER — EPINEPHRINE 1 MG/ML
INJECTION, SOLUTION, CONCENTRATE INTRAVENOUS AS NEEDED
Status: DISCONTINUED | OUTPATIENT
Start: 2020-03-17 | End: 2020-03-17 | Stop reason: HOSPADM

## 2020-03-17 RX ORDER — SODIUM CHLORIDE 0.9 % (FLUSH) 0.9 %
10 SYRINGE (ML) INJECTION EVERY 12 HOURS SCHEDULED
Status: DISCONTINUED | OUTPATIENT
Start: 2020-03-17 | End: 2020-03-17 | Stop reason: HOSPADM

## 2020-03-17 RX ORDER — LIDOCAINE HYDROCHLORIDE 20 MG/ML
INJECTION, SOLUTION INFILTRATION; PERINEURAL AS NEEDED
Status: DISCONTINUED | OUTPATIENT
Start: 2020-03-17 | End: 2020-03-17 | Stop reason: SURG

## 2020-03-17 RX ORDER — SODIUM CHLORIDE 0.9 % (FLUSH) 0.9 %
10 SYRINGE (ML) INJECTION AS NEEDED
Status: DISCONTINUED | OUTPATIENT
Start: 2020-03-17 | End: 2020-03-17 | Stop reason: HOSPADM

## 2020-03-17 RX ORDER — LABETALOL HYDROCHLORIDE 5 MG/ML
5 INJECTION, SOLUTION INTRAVENOUS
Status: DISCONTINUED | OUTPATIENT
Start: 2020-03-17 | End: 2020-03-17 | Stop reason: HOSPADM

## 2020-03-17 RX ORDER — OXYCODONE AND ACETAMINOPHEN 7.5; 325 MG/1; MG/1
2 TABLET ORAL EVERY 4 HOURS PRN
Status: DISCONTINUED | OUTPATIENT
Start: 2020-03-17 | End: 2020-03-17 | Stop reason: HOSPADM

## 2020-03-17 RX ORDER — SODIUM CHLORIDE 0.9 % (FLUSH) 0.9 %
3-10 SYRINGE (ML) INJECTION AS NEEDED
Status: DISCONTINUED | OUTPATIENT
Start: 2020-03-17 | End: 2020-03-17 | Stop reason: HOSPADM

## 2020-03-17 RX ORDER — SODIUM CHLORIDE, SODIUM LACTATE, POTASSIUM CHLORIDE, CALCIUM CHLORIDE 600; 310; 30; 20 MG/100ML; MG/100ML; MG/100ML; MG/100ML
100 INJECTION, SOLUTION INTRAVENOUS CONTINUOUS PRN
Status: DISCONTINUED | OUTPATIENT
Start: 2020-03-17 | End: 2020-03-17 | Stop reason: HOSPADM

## 2020-03-17 RX ORDER — MIDAZOLAM HYDROCHLORIDE 1 MG/ML
2 INJECTION INTRAMUSCULAR; INTRAVENOUS
Status: DISCONTINUED | OUTPATIENT
Start: 2020-03-17 | End: 2020-03-17 | Stop reason: HOSPADM

## 2020-03-17 RX ORDER — SODIUM CHLORIDE, SODIUM LACTATE, POTASSIUM CHLORIDE, CALCIUM CHLORIDE 600; 310; 30; 20 MG/100ML; MG/100ML; MG/100ML; MG/100ML
1000 INJECTION, SOLUTION INTRAVENOUS CONTINUOUS
Status: DISCONTINUED | OUTPATIENT
Start: 2020-03-17 | End: 2020-03-17 | Stop reason: HOSPADM

## 2020-03-17 RX ORDER — GLYCOPYRROLATE 0.2 MG/ML
INJECTION INTRAMUSCULAR; INTRAVENOUS AS NEEDED
Status: DISCONTINUED | OUTPATIENT
Start: 2020-03-17 | End: 2020-03-17 | Stop reason: SURG

## 2020-03-17 RX ORDER — ROCURONIUM BROMIDE 10 MG/ML
INJECTION, SOLUTION INTRAVENOUS AS NEEDED
Status: DISCONTINUED | OUTPATIENT
Start: 2020-03-17 | End: 2020-03-17 | Stop reason: SURG

## 2020-03-17 RX ORDER — IBUPROFEN 600 MG/1
600 TABLET ORAL ONCE AS NEEDED
Status: DISCONTINUED | OUTPATIENT
Start: 2020-03-17 | End: 2020-03-17 | Stop reason: HOSPADM

## 2020-03-17 RX ORDER — ACETAMINOPHEN 500 MG
1000 TABLET ORAL ONCE
Status: COMPLETED | OUTPATIENT
Start: 2020-03-17 | End: 2020-03-17

## 2020-03-17 RX ORDER — DEXAMETHASONE SODIUM PHOSPHATE 4 MG/ML
INJECTION, SOLUTION INTRA-ARTICULAR; INTRALESIONAL; INTRAMUSCULAR; INTRAVENOUS; SOFT TISSUE AS NEEDED
Status: DISCONTINUED | OUTPATIENT
Start: 2020-03-17 | End: 2020-03-17 | Stop reason: SURG

## 2020-03-17 RX ORDER — MIDAZOLAM HYDROCHLORIDE 1 MG/ML
1 INJECTION INTRAMUSCULAR; INTRAVENOUS
Status: DISCONTINUED | OUTPATIENT
Start: 2020-03-17 | End: 2020-03-17 | Stop reason: HOSPADM

## 2020-03-17 RX ORDER — IPRATROPIUM BROMIDE AND ALBUTEROL SULFATE 2.5; .5 MG/3ML; MG/3ML
3 SOLUTION RESPIRATORY (INHALATION) ONCE AS NEEDED
Status: DISCONTINUED | OUTPATIENT
Start: 2020-03-17 | End: 2020-03-17 | Stop reason: HOSPADM

## 2020-03-17 RX ORDER — NALOXONE HCL 0.4 MG/ML
0.4 VIAL (ML) INJECTION AS NEEDED
Status: DISCONTINUED | OUTPATIENT
Start: 2020-03-17 | End: 2020-03-17 | Stop reason: HOSPADM

## 2020-03-17 RX ORDER — OXYCODONE AND ACETAMINOPHEN 10; 325 MG/1; MG/1
1 TABLET ORAL ONCE AS NEEDED
Status: COMPLETED | OUTPATIENT
Start: 2020-03-17 | End: 2020-03-17

## 2020-03-17 RX ORDER — ONDANSETRON 2 MG/ML
INJECTION INTRAMUSCULAR; INTRAVENOUS AS NEEDED
Status: DISCONTINUED | OUTPATIENT
Start: 2020-03-17 | End: 2020-03-17 | Stop reason: SURG

## 2020-03-17 RX ORDER — ROPIVACAINE HYDROCHLORIDE 5 MG/ML
INJECTION, SOLUTION EPIDURAL; INFILTRATION; PERINEURAL
Status: COMPLETED | OUTPATIENT
Start: 2020-03-17 | End: 2020-03-17

## 2020-03-17 RX ORDER — SODIUM CHLORIDE 0.9 % (FLUSH) 0.9 %
3 SYRINGE (ML) INJECTION AS NEEDED
Status: DISCONTINUED | OUTPATIENT
Start: 2020-03-17 | End: 2020-03-17 | Stop reason: HOSPADM

## 2020-03-17 RX ORDER — PROPOFOL 10 MG/ML
VIAL (ML) INTRAVENOUS AS NEEDED
Status: DISCONTINUED | OUTPATIENT
Start: 2020-03-17 | End: 2020-03-17 | Stop reason: SURG

## 2020-03-17 RX ADMIN — ROCURONIUM BROMIDE 10 MG: 10 INJECTION INTRAVENOUS at 15:36

## 2020-03-17 RX ADMIN — ACETAMINOPHEN 1000 MG: 500 TABLET, FILM COATED ORAL at 14:17

## 2020-03-17 RX ADMIN — DEXAMETHASONE SODIUM PHOSPHATE 8 MG: 4 INJECTION, SOLUTION INTRAMUSCULAR; INTRAVENOUS at 15:36

## 2020-03-17 RX ADMIN — SODIUM CHLORIDE, POTASSIUM CHLORIDE, SODIUM LACTATE AND CALCIUM CHLORIDE: 600; 310; 30; 20 INJECTION, SOLUTION INTRAVENOUS at 15:34

## 2020-03-17 RX ADMIN — NEOSTIGMINE METHYLSULFATE 3 MG: 1 INJECTION INTRAVENOUS at 16:51

## 2020-03-17 RX ADMIN — ONDANSETRON HYDROCHLORIDE 4 MG: 2 SOLUTION INTRAMUSCULAR; INTRAVENOUS at 15:36

## 2020-03-17 RX ADMIN — FENTANYL CITRATE 50 MCG: 50 INJECTION, SOLUTION INTRAMUSCULAR; INTRAVENOUS at 14:57

## 2020-03-17 RX ADMIN — MIDAZOLAM 1 MG: 1 INJECTION INTRAMUSCULAR; INTRAVENOUS at 14:57

## 2020-03-17 RX ADMIN — CEFAZOLIN 3 G: 1 INJECTION, POWDER, FOR SOLUTION INTRAMUSCULAR; INTRAVENOUS; PARENTERAL at 15:46

## 2020-03-17 RX ADMIN — ROCURONIUM BROMIDE 30 MG: 10 INJECTION INTRAVENOUS at 15:42

## 2020-03-17 RX ADMIN — OXYCODONE HYDROCHLORIDE AND ACETAMINOPHEN 1 TABLET: 10; 325 TABLET ORAL at 17:25

## 2020-03-17 RX ADMIN — PROPOFOL 200 MG: 10 INJECTION, EMULSION INTRAVENOUS at 15:36

## 2020-03-17 RX ADMIN — ONDANSETRON HYDROCHLORIDE 4 MG: 2 SOLUTION INTRAMUSCULAR; INTRAVENOUS at 17:26

## 2020-03-17 RX ADMIN — GLYCOPYRROLATE 0.4 MG: 0.2 INJECTION, SOLUTION INTRAMUSCULAR; INTRAVENOUS at 16:51

## 2020-03-17 RX ADMIN — SODIUM CHLORIDE, POTASSIUM CHLORIDE, SODIUM LACTATE AND CALCIUM CHLORIDE 100 ML/HR: 600; 310; 30; 20 INJECTION, SOLUTION INTRAVENOUS at 12:41

## 2020-03-17 RX ADMIN — SODIUM CHLORIDE, POTASSIUM CHLORIDE, SODIUM LACTATE AND CALCIUM CHLORIDE: 600; 310; 30; 20 INJECTION, SOLUTION INTRAVENOUS at 16:25

## 2020-03-17 RX ADMIN — LIDOCAINE HYDROCHLORIDE 100 MG: 20 INJECTION, SOLUTION INFILTRATION; PERINEURAL at 15:36

## 2020-03-17 RX ADMIN — SUCCINYLCHOLINE CHLORIDE 140 MG: 20 INJECTION, SOLUTION INTRAMUSCULAR; INTRAVENOUS at 15:36

## 2020-03-17 RX ADMIN — VASOPRESSIN 1 ML: 20 INJECTION INTRAVENOUS at 16:03

## 2020-03-17 RX ADMIN — ROPIVACAINE HYDROCHLORIDE 20 ML: 5 INJECTION, SOLUTION EPIDURAL; INFILTRATION; PERINEURAL at 15:02

## 2020-03-17 NOTE — ANESTHESIA PROCEDURE NOTES
Peripheral Block    Pre-sedation assessment completed: 3/17/2020 2:52 PM    Patient reassessed immediately prior to procedure    Patient location during procedure: holding area  Start time: 3/17/2020 2:57 PM  Stop time: 3/17/2020 3:02 PM  Reason for block: procedure for pain, at surgeon's request, post-op pain management and Request by   Performed by  CRNA: Kandace Barron CRNA  Preanesthetic Checklist  Completed: patient identified, site marked, surgical consent, pre-op evaluation, timeout performed, IV checked, risks and benefits discussed and monitors and equipment checked  Prep:  Pt Position: supine  Sterile barriers:gloves  Prep: ChloraPrep  Patient monitoring: blood pressure monitoring, continuous pulse oximetry and EKG  Procedure  Sedation:yes  Performed under: MAC  Guidance:ultrasound guided, nerve stimulator and Brachial plexus identified and local anesthetic seen surrounding nerves  ULTRASOUND INTERPRETATION. Using ultrasound guidance a gauge needle was placed in close proximity to the brachial plexus nerve, at which point, under ultrasound guidance anesthetic was injected in the area of the nerve and spread of the anesthesia was seen on ultrasound in close proximity thereto.  There were no abnormalities seen on ultrasound; a digital image was taken; and the patient tolerated the procedure with no complications. Images:still images obtained (picture printed and placed in patients chart)  Loss of twitch: 0.5 mA  Block Type:interscalene  Injection Technique:single-shot  Needle Type:echogenic  Needle Gauge:22 G      Medications Used: ropivacaine (NAROPIN) injection 0.5 %, 20 mL  Med admintered at 3/17/2020 3:02 PM      Post Assessment  Injection Assessment: negative aspiration for heme, no paresthesia on injection and incremental injection  Patient Tolerance:comfortable throughout block  Complications:no

## 2020-03-17 NOTE — ANESTHESIA POSTPROCEDURE EVALUATION
Patient: Humberto Auguste Jr.    Procedure Summary     Date:  03/17/20 Room / Location:   [facility]  [facility]    Anesthesia Start:  1534 Anesthesia Stop:  1705    Procedure:  RIGHT SHOULDER  ROTATOR CUFF REPAIR, SUBACROMIAL DECOMPRESSION (Right Shoulder) Diagnosis:       Traumatic rotator cuff tear, right, initial encounter      Subacromial impingement of right shoulder      (S46.011D)    Surgeon:  Alphonso Lundberg MD Provider:  Humberto Bonilla CRNA    Anesthesia Type:  general with block ASA Status:  3          Anesthesia Type: general with block    Vitals  Vitals Value Taken Time   /83 3/17/2020  5:30 PM   Temp 97.4 °F (36.3 °C) 3/17/2020  5:30 PM   Pulse 78 3/17/2020  5:30 PM   Resp 14 3/17/2020  5:30 PM   SpO2 94 % 3/17/2020  5:30 PM           Post Anesthesia Care and Evaluation    Patient location during evaluation: PACU  Level of consciousness: awake  Pain management: adequate  Airway patency: patent  Anesthetic complications: No anesthetic complications  PONV Status: none  Cardiovascular status: acceptable  Respiratory status: acceptable  Hydration status: acceptable

## 2020-03-17 NOTE — H&P
Pt Name: Humberto Auguste Jr.  MRN: [id number]  YOB: 1950  Date of evaluation: 3/17/2020    H&P including current review of systems was updated in the paper chart and/or the document previously scanned into the record.  There have been no significant changes or new problems since the original evaluation.  The patient's problems continue and indications for contemplated procedure have not changed.    Electronically signed by Alphonso Lundberg MD on 3/17/2020 at 13:14

## 2020-03-17 NOTE — ANESTHESIA PREPROCEDURE EVALUATION
Anesthesia Evaluation     Patient summary reviewed and Nursing notes reviewed   no history of anesthetic complications:  NPO Solid Status: > 8 hours  NPO Liquid Status: > 8 hours           Airway   Mallampati: III  TM distance: >3 FB  Neck ROM: full  No difficulty expected  Dental      Pulmonary    (+) sleep apnea on CPAP,   (-) not a smoker  Cardiovascular   Exercise tolerance: good (4-7 METS)    (+) hypertension, hyperlipidemia,   (-) CAD      Neuro/Psych  (-) seizures, TIA, CVA  GI/Hepatic/Renal/Endo    (+) obesity,   diabetes mellitus,   (-) liver disease, no renal disease    Musculoskeletal     Abdominal    Substance History      OB/GYN          Other   arthritis,                      Anesthesia Plan    ASA 3     general with block     intravenous induction     Anesthetic plan, all risks, benefits, and alternatives have been provided, discussed and informed consent has been obtained with: patient.

## 2020-03-17 NOTE — ANESTHESIA PROCEDURE NOTES
Airway  Urgency: elective    Date/Time: 3/17/2020 3:41 PM  Airway not difficult    General Information and Staff    Patient location during procedure: OR  CRNA: Humberto Bonilla CRNA    Indications and Patient Condition  Indications for airway management: airway protection    Preoxygenated: yes  Mask difficulty assessment: 1 - vent by mask    Final Airway Details  Final airway type: endotracheal airway      Successful airway: ETT  Cuffed: yes   Successful intubation technique: direct laryngoscopy  Endotracheal tube insertion site: oral  Blade: [place]  Blade size: 2  ETT size (mm): 7.5  Cormack-Lehane Classification: grade IIa - partial view of glottis  Placement verified by: chest auscultation and capnometry   Cuff volume (mL): 7  Measured from: lips  ETT/EBT  to lips (cm): 22  Number of attempts at approach: 1  Assessment: lips, teeth, and gum same as pre-op and atraumatic intubation

## 2020-05-05 ENCOUNTER — TELEPHONE (OUTPATIENT)
Dept: UROLOGY | Facility: CLINIC | Age: 70
End: 2020-05-05

## 2020-05-05 NOTE — TELEPHONE ENCOUNTER
Patient had appointment with Lakesha for this month but we cancelled due to Covid-19. I asked if he wanted to reschedule and he said he wanted his next appointment to be with Dr. Bowden.

## 2020-05-05 NOTE — TELEPHONE ENCOUNTER
Its the patients choice who he see's. If he wants to see Dr. Bowden then schedule him an appt with him. Thanks

## 2020-09-04 NOTE — PROGRESS NOTES
Mr. Auguste is 70 y.o. male    CHIEF COMPLAINT: 6 month follow up BPH/LUTS    HPI  Here to follow-up to chronic urologic issues as follows:  -BPH with obstruction.  He is currently on tamsulosin 0.4 mg/day voiding is much improved.  Symptom severity score is 10 with quality-of-life rating it mostly satisfied.  -Erectile dysfunction-he is doing better on sildenafil.  Uses up to 60 mg.        Lab Results   Component Value Date    PSA 1.57 12/13/2019    PSA 1.32 11/27/2018     The following portions of the patient's history were reviewed and updated as appropriate: allergies, current medications, past family history, past medical history, past social history, past surgical history and problem list.      Review of Systems   Constitutional: Negative for chills and fever.   Gastrointestinal: Negative for abdominal pain, anal bleeding and blood in stool.   Genitourinary: Negative for dysuria, frequency, hematuria and urgency.         Current Outpatient Medications:   •  Cod Liver Oil 1000 MG capsule, Take 1 capsule by mouth Daily., Disp: , Rfl:   •  furosemide (LASIX) 40 MG tablet, TAKE 1 TABLET BY MOUTH ONCE DAILY, Disp: , Rfl:   •  lisinopril (PRINIVIL,ZESTRIL) 10 MG tablet, Take 10 mg by mouth Daily., Disp: , Rfl:   •  metFORMIN (GLUCOPHAGE) 500 MG tablet, Take 500 mg by mouth 2 (Two) Times a Day With Meals., Disp: , Rfl:   •  sildenafil (REVATIO) 20 MG tablet, TAKE 1 TO 4 TABLETS BY MOUTH 1 TO 2 HOURS BEFORE SEXUAL ACTIVITY AS NEEDED (Patient taking differently: Take 20 mg by mouth As Needed (pt instructed NOT to take @ least 24 hours before DOS.  Stated he understood.). TAKE 1 TO 4 TABLETS BY MOUTH 1 TO 2 HOURS BEFORE SEXUAL ACTIVITY AS NEEDED), Disp: 15 tablet, Rfl: 23  •  simvastatin (ZOCOR) 20 MG tablet, Take 20 mg by mouth Every Night., Disp: , Rfl:   •  tamsulosin (FLOMAX) 0.4 MG capsule 24 hr capsule, TAKE 1 CAPSULE BY MOUTH ONCE DAILY, Disp: 90 capsule, Rfl: 3  •  ondansetron (Zofran) 4 MG tablet, Take 1  "tablet by mouth Every 8 (Eight) Hours As Needed for Nausea or Vomiting., Disp: 10 tablet, Rfl: 0  •  oxyCODONE-acetaminophen (PERCOCET)  MG per tablet, 1-2 tabs po q4-6 hrs prn pain, Disp: 25 tablet, Rfl: 0    Past Medical History:   Diagnosis Date   • Arthritis    • Diabetes mellitus (CMS/HCC)    • Elevated cholesterol    • Hypertension    • Rotator cuff disorder, right    • Sleep apnea        Past Surgical History:   Procedure Laterality Date   • HERNIA REPAIR     • SHOULDER ARTHROSCOPY W/ ROTATOR CUFF REPAIR Right 3/17/2020    Procedure: RIGHT SHOULDER  ROTATOR CUFF REPAIR, SUBACROMIAL DECOMPRESSION;  Surgeon: Alphonso Lundberg MD;  Location: [facility] OR;  Service: Orthopedics;  Laterality: Right;   • VARICOSE VEIN SURGERY         Social History     Socioeconomic History   • Marital status:      Spouse name: Not on file   • Number of children: Not on file   • Years of education: Not on file   • Highest education level: Not on file   Tobacco Use   • Smoking status: Former Smoker     Years: 40.00     Types: Cigarettes   • Smokeless tobacco: Never Used   • Tobacco comment: Stopped 12/15/2015   Substance and Sexual Activity   • Alcohol use: No   • Drug use: No   • Sexual activity: Defer       Family History   Problem Relation Age of Onset   • No Known Problems Father    • No Known Problems Mother          Temp 97.7 °F (36.5 °C)   Ht 190.5 cm (75\")   Wt 130 kg (286 lb)   BMI 35.75 kg/m²       Physical Exam  Constitutional: Patient is without distress or deformity.  Vital signs are reviewed as above.    Neuro: No confusion; No disorientation; Alert and oriented  Pulmonary: No respiratory distress.   Skin: No pallor or diaphoresis        Data  Results for orders placed or performed in visit on 09/08/20   POC Urinalysis Dipstick, Multipro   Result Value Ref Range    Color Yellow Yellow, Straw, Dark Yellow, Amy    Clarity, UA Clear Clear    Glucose, UA Negative Negative, 1000 mg/dL (3+) mg/dL    " Bilirubin Negative Negative    Ketones, UA Negative Negative    Specific Gravity  1.020 1.005 - 1.030    Blood, UA Negative Negative    pH, Urine 6.0 5.0 - 8.0    Protein, POC 2+ (A) Negative mg/dL    Urobilinogen, UA Normal Normal    Nitrite, UA Negative Negative    Leukocytes Negative Negative     International Prostate Symptom Score  The following is posted based on patient questionnaire answers:  0 - not at all    1-7 mild symptoms  1- Less than one time in five  8-19 moderate symptoms  2 -Less than half the time  20-35 severe symptoms  3 - About half the time  4 - More than half the time  5 - Almost always     For following sections:  Incomplete Emptying: - How often have you had the sensation  of not emptying your bladder completely after you finished urinating?  1  Frequency: -How often have you had to urinate again less than   two hours after you finished urinating?      0  Intermittency: -How often have you found you stopped and started again  Several times when you urinate?       2  Urgency: -How often do you find it difficult to postpone urination?             1  Weak stream: - How often have you had a weak urinary stream?             4  Straining: - How often have you had to push or strain to begin  Urination?          1  Sleeping: -How many times did you most typically get up to urinate   From the time you went to bed at night until the time you got up in the   1  Morning          Total `  10    Quality of Life  How would you feel if you had to live with your urinary condition the way   2  It is now, no better, no worse for the rest of your life?    Where: 0=delighted; 1= pleased, 2= mostly satisfied, 3= mixed, 4 = mostly  Dissatisfied, 5= Unhappy, 6 = terrible    Bladder Scan interpretation  Estimation of residual urine via abdominal ultrasound  Residual Urine: 170 ml  Indication: BPH  Position: Supine  Examination: Incremental scanning of the suprapubic area using 3 MHz transducer using copious  amounts of acoustic gel.   Findings: An anechoic area was demonstrated which represented the bladder, with measurement of residual urine as noted. I inspected this myself. In that the residual urine was stable or insignificant, no treatment will be necessary at this time.     Assessment and Plan  Diagnoses and all orders for this visit:    Benign non-nodular prostatic hyperplasia with lower urinary tract symptoms  -     POC Urinalysis Dipstick, Multipro    Erectile dysfunction, unspecified erectile dysfunction type    -Lower urinary tract symptoms are stable.  Residual moderate but not worrisome.  -Continue sildenafil for ED.  Both of these chronic Urologic conditions were evaluated today and felt to be stable.            (Please note that portions of this note were completed with a voice recognition program.)  Camron Bowden MD  09/10/20  12:42

## 2020-09-08 ENCOUNTER — OFFICE VISIT (OUTPATIENT)
Dept: UROLOGY | Facility: CLINIC | Age: 70
End: 2020-09-08

## 2020-09-08 VITALS — HEIGHT: 75 IN | WEIGHT: 286 LBS | BODY MASS INDEX: 35.56 KG/M2 | TEMPERATURE: 97.7 F

## 2020-09-08 DIAGNOSIS — N40.1 BENIGN NON-NODULAR PROSTATIC HYPERPLASIA WITH LOWER URINARY TRACT SYMPTOMS: Primary | ICD-10-CM

## 2020-09-08 DIAGNOSIS — N52.9 ERECTILE DYSFUNCTION, UNSPECIFIED ERECTILE DYSFUNCTION TYPE: ICD-10-CM

## 2020-09-08 LAB
BILIRUB BLD-MCNC: NEGATIVE MG/DL
CLARITY, POC: CLEAR
COLOR UR: YELLOW
GLUCOSE UR STRIP-MCNC: NEGATIVE MG/DL
KETONES UR QL: NEGATIVE
LEUKOCYTE EST, POC: NEGATIVE
NITRITE UR-MCNC: NEGATIVE MG/ML
PH UR: 6 [PH] (ref 5–8)
PROT UR STRIP-MCNC: ABNORMAL MG/DL
RBC # UR STRIP: NEGATIVE /UL
SP GR UR: 1.02 (ref 1–1.03)
UROBILINOGEN UR QL: NORMAL

## 2020-09-08 PROCEDURE — 81003 URINALYSIS AUTO W/O SCOPE: CPT | Performed by: UROLOGY

## 2020-09-08 PROCEDURE — 99213 OFFICE O/P EST LOW 20 MIN: CPT | Performed by: UROLOGY

## 2020-09-08 PROCEDURE — 51798 US URINE CAPACITY MEASURE: CPT | Performed by: UROLOGY

## 2020-12-14 DIAGNOSIS — N40.1 BENIGN NON-NODULAR PROSTATIC HYPERPLASIA WITH LOWER URINARY TRACT SYMPTOMS: ICD-10-CM

## 2020-12-15 RX ORDER — TAMSULOSIN HYDROCHLORIDE 0.4 MG/1
1 CAPSULE ORAL DAILY
Qty: 90 CAPSULE | Refills: 3 | Status: SHIPPED | OUTPATIENT
Start: 2020-12-15

## 2020-12-30 ENCOUNTER — TELEPHONE (OUTPATIENT)
Dept: UROLOGY | Facility: CLINIC | Age: 70
End: 2020-12-30

## 2021-01-04 DIAGNOSIS — N52.9 ERECTILE DYSFUNCTION, UNSPECIFIED ERECTILE DYSFUNCTION TYPE: ICD-10-CM

## 2021-01-04 RX ORDER — SILDENAFIL CITRATE 20 MG/1
TABLET ORAL
Qty: 15 TABLET | Refills: 11 | Status: SHIPPED | OUTPATIENT
Start: 2021-01-04 | End: 2022-01-18 | Stop reason: SDUPTHER

## 2021-05-11 NOTE — PROGRESS NOTES
Chief Complaint   Patient presents with   • Colonoscopy     6-2-2016 colon polyp redundant colon had be 5 year recall       PCP: Feliciano Kinsey MD  REFER: No ref. provider found    Subjective     HPI    Humberto Auguste Jr. is a 70 y.o. male who presents to office for preventative maintenance.  There is  a personal history of colon polyps.  There is not a history of colon cancer.  He does not have complaints of nausea/vomiting, change in bowels, weight loss, no BRBPR, no melena.  There is not a family history of colon cancer.  There is not a family history of colon polyps.  His last colonoscopy-2016 .  Bowels do move on regular basis.    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     Past Medical History:   Diagnosis Date   • Arthritis    • Diabetes mellitus (CMS/HCC)    • Elevated cholesterol    • Hypertension    • Rotator cuff disorder, right    • Sleep apnea      Past Surgical History:   Procedure Laterality Date   • COLONOSCOPY  06/02/2016    polyp removed from 95 cm.a long [id number] colon not allowing mitchell safr colonoscopy   • HERNIA REPAIR     • SHOULDER ARTHROSCOPY W/ ROTATOR CUFF REPAIR Right 3/17/2020    Procedure: RIGHT SHOULDER  ROTATOR CUFF REPAIR, SUBACROMIAL DECOMPRESSION;  Surgeon: Alphonso Lundberg MD;  Location: [facility];  Service: Orthopedics;  Laterality: Right;   • VARICOSE VEIN SURGERY       Outpatient Medications Marked as Taking for the 5/12/21 encounter (Office Visit) with Rich Crespo APRN   Medication Sig Dispense Refill   • Cod Liver Oil 1000 MG capsule Take 1 capsule by mouth Daily.     • furosemide (LASIX) 40 MG tablet TAKE 1 TABLET BY MOUTH ONCE DAILY     • lisinopril (PRINIVIL,ZESTRIL) 10 MG tablet Take 10 mg by mouth Daily.     • metFORMIN (GLUCOPHAGE) 500 MG tablet Take 500 mg by mouth 2 (Two) Times a Day With Meals.     • sildenafil (REVATIO) 20 MG tablet TAKE  1 TO 4 TABLETS BY MOUTH 1 TO 2 HOURS BEFORE SEXUAL ACTIVITY AS NEEDED 15 tablet 11   • simvastatin (ZOCOR) 20 MG tablet Take 20 mg by mouth Every Night.     • tamsulosin (FLOMAX) 0.4 MG capsule 24 hr capsule Take 1 capsule by mouth Daily. (Patient taking differently: Take 2 capsules by mouth Daily.) 90 capsule 3     Allergies   Allergen Reactions   • Percocet [Oxycodone-Acetaminophen] Nausea Only     Social History     Socioeconomic History   • Marital status:      Spouse name: Not on file   • Number of children: Not on file   • Years of education: Not on file   • Highest education level: Not on file   Tobacco Use   • Smoking status: Former Smoker     Years: 40.00     Types: Cigarettes   • Smokeless tobacco: Never Used   • Tobacco comment: Stopped 12/15/2015   Substance and Sexual Activity   • Alcohol use: No   • Drug use: No   • Sexual activity: Defer     Review of Systems   Constitutional: Negative for unexpected weight change.   Respiratory: Negative for shortness of breath.    Cardiovascular: Negative for chest pain.   Gastrointestinal: Negative for abdominal pain and anal bleeding.     Objective   Vitals:    05/12/21 0912   BP: 110/66   Pulse: 64   Temp: 97.5 °F (36.4 °C)   SpO2: 99%     Physical Exam  Constitutional:       Appearance: Normal appearance. He is well-developed.   Eyes:      General: No scleral icterus.  Cardiovascular:      Rate and Rhythm: Regular rhythm.      Heart sounds: Normal heart sounds. No murmur heard.     Pulmonary:      Effort: Pulmonary effort is normal. No accessory muscle usage.      Breath sounds: Normal breath sounds.   Abdominal:      General: Bowel sounds are normal. There is no distension.      Palpations: Abdomen is soft. There is no mass.      Tenderness: There is no abdominal tenderness. There is no guarding or rebound.   Skin:     General: Skin is warm and dry.      Coloration: Skin is not jaundiced.   Neurological:      Mental Status: He is alert.   Psychiatric:          Behavior: Behavior is cooperative.       Imaging Results (Most Recent)     None        Body mass index is 35.5 kg/m².    Assessment/Plan   Diagnoses and all orders for this visit:    1. History of adenomatous polyp of colon (Primary)  -     Case Request; Standing  -     Implement Anesthesia Orders Day of Procedure; Standing  -     Obtain Informed Consent; Standing  -     Case Request    Other orders  -     sodium-potassium-magnesium sulfates (Suprep Bowel Prep Kit) 17.5-3.13-1.6 GM/177ML solution oral solution; Take as directed  Dispense: 177 mL; Refill: 0      COLONOSCOPY WITH ANESTHESIA (N/A)    Patient is to continue all blood pressure and cardiac medications prior to procedure and has been advised to take medications morning of procedure   Pt verbalized understanding      Patient is to continue all blood pressure and cardiac medications prior to procedure and has been advised to take medications morning of procedure   Pt verbalized understanding      Advised pt to stop use of NSAIDs, Fish Oil, and MV 5 days prior to procedure, per Dr Dixon protocol.  Tylenol based products are ok to take.  Pt verbalized understanding.     All risks, benefits, alternatives, and indications of colonoscopy procedure have been discussed with the patient. Risks to include perforation of the colon requiring possible surgery or colostomy, risk of bleeding from biopsies or removal of colon tissue, possibility of missing a colon polyp or cancer, or adverse drug reaction.  Benefits to include the diagnosis and management of disease of the colon and rectum. Alternatives to include barium enema, radiographic evaluation, lab testing or no intervention. He verbalizes understanding and agrees.           Rich Crespo, APRN  05/12/21        There are no Patient Instructions on file for this visit.

## 2021-05-12 ENCOUNTER — OFFICE VISIT (OUTPATIENT)
Dept: GASTROENTEROLOGY | Facility: CLINIC | Age: 71
End: 2021-05-12

## 2021-05-12 VITALS
OXYGEN SATURATION: 99 % | TEMPERATURE: 97.5 F | DIASTOLIC BLOOD PRESSURE: 66 MMHG | HEIGHT: 75 IN | WEIGHT: 284 LBS | HEART RATE: 64 BPM | SYSTOLIC BLOOD PRESSURE: 110 MMHG | BODY MASS INDEX: 35.31 KG/M2

## 2021-05-12 DIAGNOSIS — Z86.010 HISTORY OF ADENOMATOUS POLYP OF COLON: Primary | ICD-10-CM

## 2021-05-12 PROBLEM — Z86.0101 HISTORY OF ADENOMATOUS POLYP OF COLON: Status: ACTIVE | Noted: 2021-05-12

## 2021-05-12 PROCEDURE — S0260 H&P FOR SURGERY: HCPCS | Performed by: NURSE PRACTITIONER

## 2021-05-12 RX ORDER — SODIUM, POTASSIUM,MAG SULFATES 17.5-3.13G
SOLUTION, RECONSTITUTED, ORAL ORAL
Qty: 177 ML | Refills: 0 | Status: ON HOLD | OUTPATIENT
Start: 2021-05-12 | End: 2021-06-08

## 2021-06-08 ENCOUNTER — ANESTHESIA (OUTPATIENT)
Dept: GASTROENTEROLOGY | Facility: HOSPITAL | Age: 71
End: 2021-06-08

## 2021-06-08 ENCOUNTER — HOSPITAL ENCOUNTER (OUTPATIENT)
Facility: HOSPITAL | Age: 71
Setting detail: HOSPITAL OUTPATIENT SURGERY
Discharge: HOME | End: 2021-06-08
Attending: INTERNAL MEDICINE | Admitting: INTERNAL MEDICINE

## 2021-06-08 ENCOUNTER — ANESTHESIA EVENT (OUTPATIENT)
Dept: GASTROENTEROLOGY | Facility: HOSPITAL | Age: 71
End: 2021-06-08

## 2021-06-08 VITALS
DIASTOLIC BLOOD PRESSURE: 92 MMHG | TEMPERATURE: 97.5 F | HEIGHT: 75 IN | RESPIRATION RATE: 19 BRPM | BODY MASS INDEX: 35.06 KG/M2 | WEIGHT: 282 LBS | HEART RATE: 73 BPM | SYSTOLIC BLOOD PRESSURE: 119 MMHG | OXYGEN SATURATION: 92 %

## 2021-06-08 DIAGNOSIS — Z86.010 HISTORY OF ADENOMATOUS POLYP OF COLON: ICD-10-CM

## 2021-06-08 PROCEDURE — 25010000002 PROPOFOL 10 MG/ML EMULSION: Performed by: NURSE ANESTHETIST, CERTIFIED REGISTERED

## 2021-06-08 PROCEDURE — G0105 COLORECTAL SCRN; HI RISK IND: HCPCS | Performed by: INTERNAL MEDICINE

## 2021-06-08 RX ORDER — PROPOFOL 10 MG/ML
VIAL (ML) INTRAVENOUS AS NEEDED
Status: DISCONTINUED | OUTPATIENT
Start: 2021-06-08 | End: 2021-06-08 | Stop reason: SURG

## 2021-06-08 RX ORDER — LIDOCAINE HYDROCHLORIDE 20 MG/ML
INJECTION, SOLUTION EPIDURAL; INFILTRATION; INTRACAUDAL; PERINEURAL AS NEEDED
Status: DISCONTINUED | OUTPATIENT
Start: 2021-06-08 | End: 2021-06-08 | Stop reason: SURG

## 2021-06-08 RX ORDER — LIDOCAINE HYDROCHLORIDE 10 MG/ML
0.5 INJECTION, SOLUTION EPIDURAL; INFILTRATION; INTRACAUDAL; PERINEURAL ONCE AS NEEDED
Status: DISCONTINUED | OUTPATIENT
Start: 2021-06-08 | End: 2021-06-08 | Stop reason: HOSPADM

## 2021-06-08 RX ORDER — SODIUM CHLORIDE 9 MG/ML
500 INJECTION, SOLUTION INTRAVENOUS CONTINUOUS PRN
Status: DISCONTINUED | OUTPATIENT
Start: 2021-06-08 | End: 2021-06-08 | Stop reason: HOSPADM

## 2021-06-08 RX ORDER — SODIUM CHLORIDE 0.9 % (FLUSH) 0.9 %
10 SYRINGE (ML) INJECTION AS NEEDED
Status: DISCONTINUED | OUTPATIENT
Start: 2021-06-08 | End: 2021-06-08 | Stop reason: HOSPADM

## 2021-06-08 RX ADMIN — SODIUM CHLORIDE 500 ML: 9 INJECTION, SOLUTION INTRAVENOUS at 10:37

## 2021-06-08 RX ADMIN — PROPOFOL 50 MG: 10 INJECTION, EMULSION INTRAVENOUS at 10:51

## 2021-06-08 RX ADMIN — PROPOFOL 50 MG: 10 INJECTION, EMULSION INTRAVENOUS at 10:57

## 2021-06-08 RX ADMIN — PROPOFOL 100 MG: 10 INJECTION, EMULSION INTRAVENOUS at 10:50

## 2021-06-08 RX ADMIN — LIDOCAINE HYDROCHLORIDE 100 MG: 20 INJECTION, SOLUTION EPIDURAL; INFILTRATION; INTRACAUDAL; PERINEURAL at 10:50

## 2021-06-08 RX ADMIN — PROPOFOL 50 MG: 10 INJECTION, EMULSION INTRAVENOUS at 10:54

## 2021-06-08 RX ADMIN — PROPOFOL 50 MG: 10 INJECTION, EMULSION INTRAVENOUS at 10:55

## 2021-06-08 NOTE — ANESTHESIA PREPROCEDURE EVALUATION
Anesthesia Evaluation     Patient summary reviewed   no history of anesthetic complications:  NPO Solid Status: > 8 hours             Airway   Mallampati: II  Dental      Pulmonary    (+) sleep apnea on CPAP,   Cardiovascular   Exercise tolerance: good (4-7 METS)    (+) hypertension, hyperlipidemia,       Neuro/Psych- negative ROS  GI/Hepatic/Renal/Endo    (+) obesity,   diabetes mellitus,     Musculoskeletal     Abdominal    Substance History      OB/GYN          Other                        Anesthesia Plan    ASA 2     MAC       Anesthetic plan, all risks, benefits, and alternatives have been provided, discussed and informed consent has been obtained with: patient.

## 2021-06-08 NOTE — ANESTHESIA POSTPROCEDURE EVALUATION
Patient: Humberto Auguste Jr.    Procedure Summary     Date: 06/08/21 Room / Location: [facility] ENDOSCOPY 5 / [facility]    Anesthesia Start: 1047 Anesthesia Stop: 1107    Procedure: COLONOSCOPY WITH ANESTHESIA (N/A ) Diagnosis:       History of adenomatous polyp of colon      (History of adenomatous polyp of colon [Z86.010])    Surgeons: Rick Dixon DO Provider: Philippe Neville CRNA    Anesthesia Type: MAC ASA Status: 2          Anesthesia Type: MAC    Vitals  No vitals data found for the desired time range.          Post Anesthesia Care and Evaluation    Patient location during evaluation: PHASE II  Patient participation: complete - patient participated  Level of consciousness: awake  Pain score: 0  Pain management: adequate  Airway patency: patent  Anesthetic complications: No anesthetic complications  PONV Status: none  Cardiovascular status: acceptable  Respiratory status: acceptable  Hydration status: acceptable

## 2021-06-09 ENCOUNTER — TELEPHONE (OUTPATIENT)
Dept: GASTROENTEROLOGY | Facility: CLINIC | Age: 71
End: 2021-06-09

## 2021-09-08 NOTE — PROGRESS NOTES
Chief Complaint  Follow-up BPH with LUTS    Subjective          Humberto Auguste Jr. presents to [facility] for:  History of Present Illness  Here to follow-up to chronic urologic issues as follows:  -BPH with lower urinary tract symptoms: Since last visit he says that he is about the same.  See symptom score below.  No hematuria or UTIs.  He does take 0.8 mg of tamsulosin which he thinks has helped him. Dr. Kinsey increased this on him about a month ago.   -Erectile dysfunction: He continues to be content with the results with sildenafil.         Current Outpatient Medications:   •  Cod Liver Oil 1000 MG capsule, Take 1 capsule by mouth Daily., Disp: , Rfl:   •  furosemide (LASIX) 40 MG tablet, TAKE 1 TABLET BY MOUTH ONCE DAILY, Disp: , Rfl:   •  lisinopril (PRINIVIL,ZESTRIL) 10 MG tablet, Take 10 mg by mouth Daily., Disp: , Rfl:   •  metFORMIN (GLUCOPHAGE) 500 MG tablet, Take 500 mg by mouth 2 (Two) Times a Day With Meals., Disp: , Rfl:   •  sildenafil (REVATIO) 20 MG tablet, TAKE 1 TO 4 TABLETS BY MOUTH 1 TO 2 HOURS BEFORE SEXUAL ACTIVITY AS NEEDED, Disp: 15 tablet, Rfl: 11  •  simvastatin (ZOCOR) 20 MG tablet, Take 20 mg by mouth Every Night., Disp: , Rfl:   •  tamsulosin (FLOMAX) 0.4 MG capsule 24 hr capsule, Take 1 capsule by mouth Daily. (Patient taking differently: Take 2 capsules by mouth Daily.), Disp: 90 capsule, Rfl: 3  Past Medical History:   Diagnosis Date   • Arthritis    • Diabetes mellitus (CMS/HCC)    • Elevated cholesterol    • Hypertension    • Rotator cuff disorder, right    • Sleep apnea      Past Surgical History:   Procedure Laterality Date   • COLONOSCOPY  06/02/2016    polyp removed from 95 cm.a long [id number] colon not allowing mitchell safr colonoscopy   • COLONOSCOPY N/A 6/8/2021    Procedure: COLONOSCOPY WITH ANESTHESIA;  Surgeon: Rick Dixon DO;  Location: [facility] ENDOSCOPY;  Service: Gastroenterology;  Laterality: N/A;  pre op: hx polyps  post  op:normal  PCP: Feliciano Kinsey MD   • HERNIA REPAIR     • SHOULDER ARTHROSCOPY W/ ROTATOR CUFF REPAIR Right 3/17/2020    Procedure: RIGHT SHOULDER  ROTATOR CUFF REPAIR, SUBACROMIAL DECOMPRESSION;  Surgeon: Alphonso Lundberg MD;  Location: [facility];  Service: Orthopedics;  Laterality: Right;   • VARICOSE VEIN SURGERY           Review  of systems  Constitutional: Negative for chills and fever.   Gastrointestinal: Negative for abdominal pain, anal bleeding and blood in stool.   Genitourinary: Negative for flank pain and hematuria.       Objective   PHYSICAL EXAM  Vital Signs:   There were no vitals taken for this visit.    Constitutional: Patient is without distress or deformity.  Vital signs are reviewed as above.    Neuro: No confusion; No disorientation; Alert and oriented  Pulmonary: No respiratory distress.   Skin: No pallor or diaphoresis  GI: Abdomen is soft  and nontender.  No significant distention.  No hernias noted.  : Penis and testicles are normal. Benign feeling prostate without nodule approximately 35 mL in size.           DATA  Result Review :     Bladder Scan interpretation  Estimation of residual urine via abdominal ultrasound  Residual Urine:400 ml  Indication: BPH  Position: Supine  Examination: Incremental scanning of the suprapubic area using 3 MHz transducer using copious amounts of acoustic gel.   Findings: An anechoic area was demonstrated which represented the bladder, with measurement of residual urine as noted. I inspected this myself. In that the residual urine was stable or insignificant, no treatment will be necessary at this time.     International Prostate Symptom Score  The following is posted based on patient questionnaire answers:  0 - not at all    1-7 mild symptoms  1- Less than one time in five  8-19 moderate symptoms  2 -Less than half the time  20-35 severe symptoms  3 - About half the time  4 - More than half the time  5 - Almost always     For following  sections:  Incomplete Emptying: - How often have you had the sensation  of not emptying your bladder completely after you finished urinating?  1  Frequency: -How often have you had to urinate again less than   two hours after you finished urinating?      1  Intermittency: -How often have you found you stopped and started again  Several times when you urinate?       1  Urgency: -How often do you find it difficult to postpone urination?             2  Weak stream: - How often have you had a weak urinary stream?             5  Straining: - How often have you had to push or strain to begin  Urination?          1  Sleeping: -How many times did you most typically get up to urinate   From the time you went to bed at night until the time you got up in the   1  Morning          Total `  12    Quality of Life  How would you feel if you had to live with your urinary condition the way   3  It is now, no better, no worse for the rest of your life?    Where: 0=delighted; 1= pleased, 2= mostly satisfied, 3= mixed, 4 = mostly  Dissatisfied, 5= Unhappy, 6 = terrible      Results for orders placed or performed in visit on 09/08/20   POC Urinalysis Dipstick, Multipro    Specimen: Urine   Result Value Ref Range    Color Yellow Yellow, Straw, Dark Yellow, Amy    Clarity, UA Clear Clear    Glucose, UA Negative Negative, 1000 mg/dL (3+) mg/dL    Bilirubin Negative Negative    Ketones, UA Negative Negative    Specific Gravity  1.020 1.005 - 1.030    Blood, UA Negative Negative    pH, Urine 6.0 5.0 - 8.0    Protein, POC 2+ (A) Negative mg/dL    Urobilinogen, UA Normal Normal    Nitrite, UA Negative Negative    Leukocytes Negative Negative                ASSESSMENT AND PLAN      Problem List Items Addressed This Visit     None      Visit Diagnoses     Benign non-nodular prostatic hyperplasia with lower urinary tract symptoms    -  Primary    Erectile dysfunction, unspecified erectile dysfunction type          Both of these chronic Urologic  conditions were evaluated today.   BPH  -I am a bit concerned that his BPH is worsening. Will continue his 0.8 mg of tamsulosin.   -Repeat bladder scan at a nurse visit in a few weeks. If PVR is >250, He will need a cystoscopy. Consider UroLift (wife had him ask about).   ED  -Continue sildenafil         FOLLOW UP     No follow-ups on file.        (Please note that portions of this note were completed with a voice recognition program.)  Camron Bowden MD  09/08/21  07:04 CDT

## 2021-09-09 ENCOUNTER — OFFICE VISIT (OUTPATIENT)
Dept: UROLOGY | Facility: CLINIC | Age: 71
End: 2021-09-09

## 2021-09-09 VITALS — HEIGHT: 75 IN | WEIGHT: 288 LBS | TEMPERATURE: 97.5 F | BODY MASS INDEX: 35.81 KG/M2

## 2021-09-09 DIAGNOSIS — N52.9 ERECTILE DYSFUNCTION, UNSPECIFIED ERECTILE DYSFUNCTION TYPE: ICD-10-CM

## 2021-09-09 DIAGNOSIS — N40.1 BENIGN NON-NODULAR PROSTATIC HYPERPLASIA WITH LOWER URINARY TRACT SYMPTOMS: Primary | ICD-10-CM

## 2021-09-09 LAB
BILIRUB BLD-MCNC: NEGATIVE MG/DL
CLARITY, POC: CLEAR
COLOR UR: YELLOW
GLUCOSE UR STRIP-MCNC: ABNORMAL MG/DL
KETONES UR QL: NEGATIVE
LEUKOCYTE EST, POC: NEGATIVE
NITRITE UR-MCNC: NEGATIVE MG/ML
PH UR: 5 [PH] (ref 5–8)
PROT UR STRIP-MCNC: NEGATIVE MG/DL
RBC # UR STRIP: NEGATIVE /UL
SP GR UR: 1.01 (ref 1–1.03)
UROBILINOGEN UR QL: NORMAL

## 2021-09-09 PROCEDURE — 99214 OFFICE O/P EST MOD 30 MIN: CPT | Performed by: UROLOGY

## 2021-09-09 PROCEDURE — 51798 US URINE CAPACITY MEASURE: CPT | Performed by: UROLOGY

## 2021-09-09 PROCEDURE — 81001 URINALYSIS AUTO W/SCOPE: CPT | Performed by: UROLOGY

## 2021-11-10 ENCOUNTER — PROCEDURE VISIT (OUTPATIENT)
Dept: UROLOGY | Facility: CLINIC | Age: 71
End: 2021-11-10

## 2021-11-10 DIAGNOSIS — N40.1 BENIGN NON-NODULAR PROSTATIC HYPERPLASIA WITH LOWER URINARY TRACT SYMPTOMS: Primary | ICD-10-CM

## 2021-11-10 NOTE — PROGRESS NOTES
Bladder Scan interpretation  Estimation of residual urine via abdominal ultrasound  Residual Urine: 613ml  Indication: BPH  Position: Supine  Examination: Incremental scanning of the suprapubic area using 3 MHz transducer using copious amounts of acoustic gel.   Findings: An anechoic area was demonstrated which represented the bladder, with measurement of residual urine as noted. I inspected this myself. In that the residual urine was stable or insignificant, no treatment will be necessary at this time.

## 2021-11-22 ENCOUNTER — PROCEDURE VISIT (OUTPATIENT)
Dept: UROLOGY | Facility: CLINIC | Age: 71
End: 2021-11-22

## 2021-11-22 DIAGNOSIS — N40.1 BENIGN NON-NODULAR PROSTATIC HYPERPLASIA WITH LOWER URINARY TRACT SYMPTOMS: Primary | ICD-10-CM

## 2021-11-22 LAB
BILIRUB BLD-MCNC: NEGATIVE MG/DL
CLARITY, POC: CLEAR
COLOR UR: YELLOW
GLUCOSE UR STRIP-MCNC: NEGATIVE MG/DL
KETONES UR QL: NEGATIVE
LEUKOCYTE EST, POC: NEGATIVE
NITRITE UR-MCNC: NEGATIVE MG/ML
PH UR: 6 [PH] (ref 5–8)
PROT UR STRIP-MCNC: NEGATIVE MG/DL
RBC # UR STRIP: NEGATIVE /UL
SP GR UR: 1.01 (ref 1–1.03)
UROBILINOGEN UR QL: NORMAL

## 2021-11-22 PROCEDURE — 81001 URINALYSIS AUTO W/SCOPE: CPT | Performed by: UROLOGY

## 2021-11-22 PROCEDURE — 52000 CYSTOURETHROSCOPY: CPT | Performed by: UROLOGY

## 2021-11-22 RX ORDER — SODIUM CHLORIDE 9 MG/ML
100 INJECTION, SOLUTION INTRAVENOUS CONTINUOUS
Status: CANCELLED | OUTPATIENT
Start: 2021-11-22

## 2021-11-22 NOTE — PROGRESS NOTES
CC: I am here for the doctor to look at my bladder    Cystoscopy procedure note  Pre- operative diagnosis:  Incomplete bladder empyting    Post operative diagnosis:  Same    Procedure:  The patient was prepped and draped in a normal sterile fashion.  The urethra was anesthetized with 2% lidocaine jelly.  A well lubricated flexible cystoscope was introduced per urethra.  The anterior urethra is normal in its caliber without evidence of a mass.  There appears to be no contracture at the bladder neck.  The prostatic urethra reveals Bilateral lobe enlargement.   The bladder shows a normal urothelium without evidence of a mass, erythema, nor diverticulum.  There is no evidence of a bladder stone nor foreign body.  The detrusor is moderately trabeculated.  The ureteral orifces are essentially normal in locationn and there is clear efflux of urine.    Patient tolerated the procedure well    Complications: none    Blood loss: minimal    Diagnoses and all orders for this visit:    1. Benign non-nodular prostatic hyperplasia with lower urinary tract symptoms (Primary)  -     POC Urinalysis Dipstick, Multipro  -     Case Request; Standing  -     COVID PRE-OP / PRE-PROCEDURE SCREENING ORDER (NO ISOLATION) - Swab, Nasopharynx; Future  -     Case Request    Other orders  -     Follow Anesthesia Guidelines / Standing Orders; Future  -     Provide NPO Instructions to Patient; Future  -     Chlorhexidine Skin Prep; Future      Follow up:    I think his best option is a TURP.  He continues to have markedly elevated residuals.  I am hopeful he is not developed hypocontractility.  He clearly has an obstructed prostate.  I am going  set him up for a TURP next month

## 2022-01-17 ENCOUNTER — PRE-ADMISSION TESTING (OUTPATIENT)
Dept: PREADMISSION TESTING | Facility: HOSPITAL | Age: 72
End: 2022-01-17

## 2022-01-17 ENCOUNTER — LAB (OUTPATIENT)
Dept: LAB | Facility: HOSPITAL | Age: 72
End: 2022-01-17

## 2022-01-17 VITALS
BODY MASS INDEX: 36.64 KG/M2 | HEIGHT: 74 IN | WEIGHT: 285.5 LBS | SYSTOLIC BLOOD PRESSURE: 124 MMHG | OXYGEN SATURATION: 97 % | HEART RATE: 84 BPM | DIASTOLIC BLOOD PRESSURE: 69 MMHG | RESPIRATION RATE: 18 BRPM

## 2022-01-17 DIAGNOSIS — N40.1 BENIGN NON-NODULAR PROSTATIC HYPERPLASIA WITH LOWER URINARY TRACT SYMPTOMS: ICD-10-CM

## 2022-01-17 LAB — SARS-COV-2 ORF1AB RESP QL NAA+PROBE: DETECTED

## 2022-01-17 PROCEDURE — U0005 INFEC AGEN DETEC AMPLI PROBE: HCPCS

## 2022-01-17 PROCEDURE — 93010 ELECTROCARDIOGRAM REPORT: CPT | Performed by: INTERNAL MEDICINE

## 2022-01-17 PROCEDURE — U0004 COV-19 TEST NON-CDC HGH THRU: HCPCS

## 2022-01-17 PROCEDURE — 93005 ELECTROCARDIOGRAM TRACING: CPT

## 2022-01-17 PROCEDURE — C9803 HOPD COVID-19 SPEC COLLECT: HCPCS

## 2022-01-17 NOTE — DISCHARGE INSTRUCTIONS
DAY OF SURGERY INSTRUCTIONS          ARRIVAL TIME: AS DIRECTED BY OFFICE    YOU MAY TAKE THE FOLLOWING MEDICATION(S) THE MORNING OF SURGERY WITH A SIP OF WATER: ***none    ALL OTHER HOME MEDICATIONS CHECK WITH YOUR DOCTOR (ask your health care provider about changing or stopping your regular medicines, especially if you are taking diabetes medicines or blood thinners)    DO NOT TAKE ANY ERECTILE DYSFUNCTION MEDICATIONS (EX:  CIALIS, VIAGRA) 24 HOURS PRIOR TO SURGERY              MANAGING PAIN AFTER SURGERY    We know you are probably wondering what your pain will be like after surgery.  Following surgery it is unrealistic to expect you will not have pain.   Pain is how our bodies let us know that something is wrong or cautions us to be careful.  That said, our goal is to make your pain tolerable.    Methods we may use to treat your pain include (oral or IV medications, PCAs, epidurals, nerve blocks, etc.)   While some procedures require IV pain medications for a short time after surgery, transitioning to pain medications by mouth allows for better management of pain.   Your nurse will encourage you to take oral pain medications whenever possible.  IV medications work almost immediately, but only last a short while.  Taking medications by mouth allows for a more constant level of medication in your blood stream for a longer period of time.      Once your pain is out of control it is harder to get back under control.  It is important you are aware when your next dose of pain medication is due.  If you are admitted, your nurse may write the time of your next dose on the white board in your room to help you remember.      We are interested in your pain and encourage you to inform us about aggravating factors during your visit.   Many times a simple repositioning every few hours can make a big difference.    If your physician says it is okay, do not let your pain prevent you from getting out of bed. Be sure to call your  nurse for assistance prior to getting up so you do not fall.      Before surgery, please decide your tolerable pain goal.  These faces help describe the pain ratings we use on a 0-10 scale.   Be prepared to tell us your goal and whether or not you take pain or anxiety medications at home.      BEFORE YOU COME TO THE HOSPITAL  (Pre-op instructions)  • Do not eat, drink, smoke or chew gum after midnight the night before surgery.  This also includes no mints.  • Morning of surgery take only the medicines you have been instructed with a sip of water unless otherwise instructed  by your physician.  • Do not shave, wear makeup or dark nail polish.  • Remove all jewelry including rings.  • Leave anything you consider valuable at home.  • Leave your suitcase in the car until after your surgery.  • Bring the following with you if applicable:  o Picture ID and insurance, Medicare or Medicaid cards  o Co-pay/deductible required by insurance (cash, check, credit card)  o Copy of advance directive, living will or power-of- documents if not brought to Pre-work  o CPAP or BIPAP mask and tubing  o Relaxation aids ( book, magazine), etc.  o Hearing aids                                 ON THE DAY OF SURGERY  · On the day of surgery check in at registration located at the main entrance of the hospital. Only one family member or friend are allowed per patient.  ? You will be registered and given a beeper with instructions where to wait in the main lobby.  ? When your beeper lights up and vibrates a member of the Outpatient Surgery staff will meet you at the double doors under the stair steps and escort you to your preoperative room.   · You may have cloth compression devices placed on your legs. These help to prevent blood clots and reduce swelling in your legs.  · An IV may be inserted into one of your veins.  · In the operating room, you may be given one or more of the following:  ? A medicine to help you relax  "(sedative).  ? A medicine to numb the area (local anesthetic).  ? A medicine to make you fall asleep (general anesthetic).  ? A medicine that is injected into an area of your body to numb everything below the injection site (regional anesthetic).  · Your surgical site will be marked or identified.  · You may be given an antibiotic through your IV to help prevent infection.  Contact a health care provider if you:  · Develop a fever of more than 100.4°F (38°C) or other feelings of illness during the 48 hours before your surgery.  · Have symptoms that get worse.  Have questions or concerns about your surgery    General Anesthesia/Surgery, Adult  General anesthesia is the use of medicines to make a person \"go to sleep\" (unconscious) for a medical procedure. General anesthesia must be used for certain procedures, and is often recommended for procedures that:  · Last a long time.  · Require you to be still or in an unusual position.  · Are major and can cause blood loss.  The medicines used for general anesthesia are called general anesthetics. As well as making you unconscious for a certain amount of time, these medicines:  · Prevent pain.  · Control your blood pressure.  · Relax your muscles.  Tell a health care provider about:  · Any allergies you have.  · All medicines you are taking, including vitamins, herbs, eye drops, creams, and over-the-counter medicines.  · Any problems you or family members have had with anesthetic medicines.  · Types of anesthetics you have had in the past.  · Any blood disorders you have.  · Any surgeries you have had.  · Any medical conditions you have.  · Any recent upper respiratory, chest, or ear infections.  · Any history of:  ? Heart or lung conditions, such as heart failure, sleep apnea, asthma, or chronic obstructive pulmonary disease (COPD).  ?  service.  ? Depression or anxiety.  · Any tobacco or drug use, including marijuana or alcohol use.  · Whether you are pregnant or " may be pregnant.  What are the risks?  Generally, this is a safe procedure. However, problems may occur, including:  · Allergic reaction.  · Lung and heart problems.  · Inhaling food or liquid from the stomach into the lungs (aspiration).  · Nerve injury.  · Air in the bloodstream, which can lead to stroke.  · Extreme agitation or confusion (delirium) when you wake up from the anesthetic.  · Waking up during your procedure and being unable to move. This is rare.  These problems are more likely to develop if you are having a major surgery or if you have an advanced or serious medical condition. You can prevent some of these complications by answering all of your health care provider's questions thoroughly and by following all instructions before your procedure.  General anesthesia can cause side effects, including:  · Nausea or vomiting.  · A sore throat from the breathing tube.  · Hoarseness.  · Wheezing or coughing.  · Shaking chills.  · Tiredness.  · Body aches.  · Anxiety.  · Sleepiness or drowsiness.  · Confusion or agitation.  RISKS AND COMPLICATIONS OF SURGERY  Your health care provider will discuss possible risks and complications with you before surgery. Common risks and complications include:    · Problems due to the use of anesthetics.  · Blood loss and replacement (does not apply to minor surgical procedures).  · Temporary increase in pain due to surgery.  · Uncorrected pain or problems that the surgery was meant to correct.  · Infection.  · New damage.    What happens before the procedure?    Medicines  Ask your health care provider about:  · Changing or stopping your regular medicines. This is especially important if you are taking diabetes medicines or blood thinners.  · Taking medicines such as aspirin and ibuprofen. These medicines can thin your blood. Do not take these medicines unless your health care provider tells you to take them.  · Taking over-the-counter medicines, vitamins, herbs, and  supplements. Do not take these during the week before your procedure unless your health care provider approves them.  General instructions  · Starting 3-6 weeks before the procedure, do not use any products that contain nicotine or tobacco, such as cigarettes and e-cigarettes. If you need help quitting, ask your health care provider.  · If you brush your teeth on the morning of the procedure, make sure to spit out all of the toothpaste.  · Tell your health care provider if you become ill or develop a cold, cough, or fever.  · If instructed by your health care provider, bring your sleep apnea device with you on the day of your surgery (if applicable).  · Ask your health care provider if you will be going home the same day, the following day, or after a longer hospital stay.  ? Plan to have someone take you home from the hospital or clinic.  ? Plan to have a responsible adult care for you for at least 24 hours after you leave the hospital or clinic. This is important.  What happens during the procedure?  · You will be given anesthetics through both of the following:  ? A mask placed over your nose and mouth.  ? An IV in one of your veins.  · You may receive a medicine to help you relax (sedative).  · After you are unconscious, a breathing tube may be inserted down your throat to help you breathe. This will be removed before you wake up.  · An anesthesia specialist will stay with you throughout your procedure. He or she will:  ? Keep you comfortable and safe by continuing to give you medicines and adjusting the amount of medicine that you get.  ? Monitor your blood pressure, pulse, and oxygen levels to make sure that the anesthetics do not cause any problems.  The procedure may vary among health care providers and hospitals.  What happens after the procedure?  · Your blood pressure, temperature, heart rate, breathing rate, and blood oxygen level will be monitored until the medicines you were given have worn off.  · You  will wake up in a recovery area. You may wake up slowly.  · If you feel anxious or agitated, you may be given medicine to help you calm down.  · If you will be going home the same day, your health care provider may check to make sure you can walk, drink, and urinate.  · Your health care provider will treat any pain or side effects you have before you go home.  · Do not drive for 24 hours if you were given a sedative.  Summary  · General anesthesia is used to keep you still and prevent pain during a procedure.  · It is important to tell your healthcare provider about your medical history and any surgeries you have had, and previous experience with anesthesia.  · Follow your healthcare provider’s instructions about when to stop eating, drinking, or taking certain medicines before your procedure.  · Plan to have someone take you home from the hospital or clinic.  This information is not intended to replace advice given to you by your health care provider. Make sure you discuss any questions you have with your health care provider.  Document Released: 03/26/2009 Document Revised: 08/03/2018 Document Reviewed: 08/03/2018  [employer] Interactive Patient Education © 2019 [employer]      Fall Prevention in Hospitals, Adult  As a hospital patient, your condition and the treatments you receive can increase your risk for falls. Some additional risk factors for falls in a hospital include:  · Being in an unfamiliar environment.  · Being on bed rest.  · Your surgery.  · Taking certain medicines.  · Your tubing requirements, such as intravenous (IV) therapy or catheters.  It is important that you learn how to decrease fall risks while at the hospital. Below are important tips that can help prevent falls.  SAFETY TIPS FOR PREVENTING FALLS  Talk about your risk of falling.  · Ask your health care provider why you are at risk for falling. Is it your medicine, illness, tubing placement, or something else?  · Make a plan with your  health care provider to keep you safe from falls.  · Ask your health care provider or pharmacist about side effects of your medicines. Some medicines can make you dizzy or affect your coordination.  Ask for help.  · Ask for help before getting out of bed. You may need to press your call button.  · Ask for assistance in getting safely to the toilet.  · Ask for a walker or cane to be put at your bedside. Ask that most of the side rails on your bed be placed up before your health care provider leaves the room.  · Ask family or friends to sit with you.  · Ask for things that are out of your reach, such as your glasses, hearing aids, telephone, bedside table, or call button.  Follow these tips to avoid falling:  · Stay lying or seated, rather than standing, while waiting for help.  · Wear rubber-soled slippers or shoes whenever you walk in the hospital.  · Avoid quick, sudden movements.  ¨ Change positions slowly.  ¨ Sit on the side of your bed before standing.  ¨ Stand up slowly and wait before you start to walk.  · Let your health care provider know if there is a spill on the floor.  · Pay careful attention to the medical equipment, electrical cords, and tubes around you.  · When you need help, use your call button by your bed or in the bathroom. Wait for one of your health care providers to help you.  · If you feel dizzy or unsure of your footing, return to bed and wait for assistance.  · Avoid being distracted by the TV, telephone, or another person in your room.  · Do not lean or support yourself on rolling objects, such as IV poles or bedside tables.     This information is not intended to replace advice given to you by your health care provider. Make sure you discuss any questions you have with your health care provider.     Document Released: 12/15/2001 Document Revised: 01/08/2016 Document Reviewed: 08/25/2013  [employer] Interactive Patient Education ©2016 [employer]            PATIENT/FAMILY/RESPONSIBLE PARTY  VERBALIZES UNDERSTANDING OF ABOVE EDUCATION.  COPY OF PAIN SCALE GIVEN AND REVIEWED WITH VERBALIZED UNDERSTANDING.

## 2022-01-18 ENCOUNTER — TELEPHONE (OUTPATIENT)
Dept: UROLOGY | Facility: CLINIC | Age: 72
End: 2022-01-18

## 2022-01-18 DIAGNOSIS — N52.9 ERECTILE DYSFUNCTION, UNSPECIFIED ERECTILE DYSFUNCTION TYPE: ICD-10-CM

## 2022-01-18 LAB
QT INTERVAL: 388 MS
QTC INTERVAL: 464 MS

## 2022-01-18 RX ORDER — SILDENAFIL CITRATE 20 MG/1
TABLET ORAL
Qty: 15 TABLET | Refills: 11 | Status: SHIPPED | OUTPATIENT
Start: 2022-01-18 | End: 2023-02-23 | Stop reason: SDUPTHER

## 2022-01-18 NOTE — PROGRESS NOTES
He is COVID-positive.  I did contact him.  He is asymptomatic.  I told him he needs to quarantine for at least 5 days.  We are to postpone his procedure until Friday, January 28, 2022.  I'm going to have Marlyn Canales CMA move him in the schedule. We should be able to put him on for 0700

## 2022-01-18 NOTE — TELEPHONE ENCOUNTER
Incoming Refill Request      Medication requested (name and dose): Sildenafil 20mg    Pharmacy where request should be sent: [facility] [address]    Additional details provided by patient: surgery was cancelled, would now like to have this refilled.    Best call back number: [phone]    Does the patient have less than a 3 day supply:  [x] Yes  [] No    Maxi Moreno  01/18/22, 08:52 CST

## 2022-01-19 DIAGNOSIS — N40.1 BENIGN NON-NODULAR PROSTATIC HYPERPLASIA WITH LOWER URINARY TRACT SYMPTOMS: Primary | ICD-10-CM

## 2022-01-26 ENCOUNTER — LAB (OUTPATIENT)
Dept: LAB | Facility: HOSPITAL | Age: 72
End: 2022-01-26

## 2022-01-26 DIAGNOSIS — N40.1 BENIGN NON-NODULAR PROSTATIC HYPERPLASIA WITH LOWER URINARY TRACT SYMPTOMS: ICD-10-CM

## 2022-01-26 LAB — SARS-COV-2 ORF1AB RESP QL NAA+PROBE: NOT DETECTED

## 2022-01-26 PROCEDURE — C9803 HOPD COVID-19 SPEC COLLECT: HCPCS

## 2022-01-26 PROCEDURE — U0005 INFEC AGEN DETEC AMPLI PROBE: HCPCS

## 2022-01-26 PROCEDURE — U0004 COV-19 TEST NON-CDC HGH THRU: HCPCS

## 2022-01-28 ENCOUNTER — ANESTHESIA EVENT (OUTPATIENT)
Dept: PERIOP | Facility: HOSPITAL | Age: 72
End: 2022-01-28

## 2022-01-28 ENCOUNTER — HOSPITAL ENCOUNTER (OUTPATIENT)
Facility: HOSPITAL | Age: 72
Discharge: HOME | End: 2022-01-29
Attending: UROLOGY | Admitting: UROLOGY

## 2022-01-28 ENCOUNTER — ANESTHESIA (OUTPATIENT)
Dept: PERIOP | Facility: HOSPITAL | Age: 72
End: 2022-01-28

## 2022-01-28 DIAGNOSIS — N40.1 BENIGN NON-NODULAR PROSTATIC HYPERPLASIA WITH LOWER URINARY TRACT SYMPTOMS: ICD-10-CM

## 2022-01-28 PROBLEM — E78.01 FAMILIAL HYPERCHOLESTEROLEMIA: Status: ACTIVE | Noted: 2017-08-23

## 2022-01-28 PROBLEM — I10 ESSENTIAL (PRIMARY) HYPERTENSION: Status: ACTIVE | Noted: 2017-08-23

## 2022-01-28 PROBLEM — G47.33 OBSTRUCTIVE SLEEP APNEA: Status: ACTIVE | Noted: 2019-01-15

## 2022-01-28 PROBLEM — E11.9 TYPE 2 DIABETES MELLITUS WITHOUT COMPLICATION, WITHOUT LONG-TERM CURRENT USE OF INSULIN (HCC): Status: ACTIVE | Noted: 2017-08-23

## 2022-01-28 LAB
GLUCOSE BLDC GLUCOMTR-MCNC: 138 MG/DL (ref 70–130)
GLUCOSE BLDC GLUCOMTR-MCNC: 156 MG/DL (ref 70–130)

## 2022-01-28 PROCEDURE — 25010000002 ONDANSETRON PER 1 MG: Performed by: UROLOGY

## 2022-01-28 PROCEDURE — 25010000002 ONDANSETRON PER 1 MG: Performed by: NURSE ANESTHETIST, CERTIFIED REGISTERED

## 2022-01-28 PROCEDURE — A9270 NON-COVERED ITEM OR SERVICE: HCPCS | Performed by: UROLOGY

## 2022-01-28 PROCEDURE — 25010000002 FENTANYL CITRATE (PF) 100 MCG/2ML SOLUTION: Performed by: NURSE ANESTHETIST, CERTIFIED REGISTERED

## 2022-01-28 PROCEDURE — 63710000001 ATORVASTATIN 10 MG TABLET: Performed by: UROLOGY

## 2022-01-28 PROCEDURE — 25010000002 DEXAMETHASONE PER 1 MG: Performed by: NURSE ANESTHETIST, CERTIFIED REGISTERED

## 2022-01-28 PROCEDURE — 82962 GLUCOSE BLOOD TEST: CPT

## 2022-01-28 PROCEDURE — 63710000001 TAMSULOSIN 0.4 MG CAPSULE: Performed by: UROLOGY

## 2022-01-28 PROCEDURE — 25010000002 PROPOFOL 10 MG/ML EMULSION: Performed by: NURSE ANESTHETIST, CERTIFIED REGISTERED

## 2022-01-28 PROCEDURE — 63710000001 HYDROCODONE-ACETAMINOPHEN 7.5-325 MG TABLET: Performed by: UROLOGY

## 2022-01-28 PROCEDURE — S0260 H&P FOR SURGERY: HCPCS | Performed by: UROLOGY

## 2022-01-28 PROCEDURE — 52601 PROSTATECTOMY (TURP): CPT | Performed by: UROLOGY

## 2022-01-28 PROCEDURE — 63710000001 LISINOPRIL 10 MG TABLET: Performed by: UROLOGY

## 2022-01-28 PROCEDURE — 88342 IMHCHEM/IMCYTCHM 1ST ANTB: CPT | Performed by: UROLOGY

## 2022-01-28 PROCEDURE — 63710000001 OXYCODONE-ACETAMINOPHEN 7.5-325 MG TABLET: Performed by: ANESTHESIOLOGY

## 2022-01-28 PROCEDURE — A9270 NON-COVERED ITEM OR SERVICE: HCPCS | Performed by: ANESTHESIOLOGY

## 2022-01-28 PROCEDURE — 63710000001 METFORMIN 500 MG TABLET: Performed by: UROLOGY

## 2022-01-28 PROCEDURE — 25010000002 CEFAZOLIN PER 500 MG

## 2022-01-28 PROCEDURE — 25010000002 ONDANSETRON PER 1 MG: Performed by: ANESTHESIOLOGY

## 2022-01-28 PROCEDURE — 88305 TISSUE EXAM BY PATHOLOGIST: CPT | Performed by: UROLOGY

## 2022-01-28 PROCEDURE — 25010000002 FENTANYL CITRATE (PF) 50 MCG/ML SOLUTION: Performed by: ANESTHESIOLOGY

## 2022-01-28 RX ORDER — HYDROCODONE BITARTRATE AND ACETAMINOPHEN 7.5; 325 MG/1; MG/1
1 TABLET ORAL EVERY 4 HOURS PRN
Status: DISCONTINUED | OUTPATIENT
Start: 2022-01-28 | End: 2022-01-29 | Stop reason: HOSPADM

## 2022-01-28 RX ORDER — SODIUM CHLORIDE 0.9 % (FLUSH) 0.9 %
3 SYRINGE (ML) INJECTION AS NEEDED
Status: DISCONTINUED | OUTPATIENT
Start: 2022-01-28 | End: 2022-01-28 | Stop reason: HOSPADM

## 2022-01-28 RX ORDER — ONDANSETRON 2 MG/ML
4 INJECTION INTRAMUSCULAR; INTRAVENOUS EVERY 6 HOURS PRN
Status: DISCONTINUED | OUTPATIENT
Start: 2022-01-28 | End: 2022-01-29 | Stop reason: HOSPADM

## 2022-01-28 RX ORDER — NALOXONE HCL 0.4 MG/ML
0.4 VIAL (ML) INJECTION AS NEEDED
Status: DISCONTINUED | OUTPATIENT
Start: 2022-01-28 | End: 2022-01-28 | Stop reason: HOSPADM

## 2022-01-28 RX ORDER — SODIUM CHLORIDE 0.9 % (FLUSH) 0.9 %
3 SYRINGE (ML) INJECTION EVERY 12 HOURS SCHEDULED
Status: DISCONTINUED | OUTPATIENT
Start: 2022-01-28 | End: 2022-01-28 | Stop reason: HOSPADM

## 2022-01-28 RX ORDER — ONDANSETRON 2 MG/ML
INJECTION INTRAMUSCULAR; INTRAVENOUS AS NEEDED
Status: DISCONTINUED | OUTPATIENT
Start: 2022-01-28 | End: 2022-01-28 | Stop reason: SURG

## 2022-01-28 RX ORDER — TAMSULOSIN HYDROCHLORIDE 0.4 MG/1
0.4 CAPSULE ORAL DAILY
Status: DISCONTINUED | OUTPATIENT
Start: 2022-01-28 | End: 2022-01-29 | Stop reason: HOSPADM

## 2022-01-28 RX ORDER — LISINOPRIL 10 MG/1
10 TABLET ORAL DAILY
Status: DISCONTINUED | OUTPATIENT
Start: 2022-01-28 | End: 2022-01-29 | Stop reason: HOSPADM

## 2022-01-28 RX ORDER — MAGNESIUM HYDROXIDE 1200 MG/15ML
LIQUID ORAL AS NEEDED
Status: DISCONTINUED | OUTPATIENT
Start: 2022-01-28 | End: 2022-01-28 | Stop reason: HOSPADM

## 2022-01-28 RX ORDER — MIDAZOLAM HYDROCHLORIDE 1 MG/ML
0.5 INJECTION INTRAMUSCULAR; INTRAVENOUS
Status: DISCONTINUED | OUTPATIENT
Start: 2022-01-28 | End: 2022-01-28 | Stop reason: HOSPADM

## 2022-01-28 RX ORDER — OXYCODONE AND ACETAMINOPHEN 10; 325 MG/1; MG/1
1 TABLET ORAL ONCE AS NEEDED
Status: DISCONTINUED | OUTPATIENT
Start: 2022-01-28 | End: 2022-01-28 | Stop reason: HOSPADM

## 2022-01-28 RX ORDER — LIDOCAINE HYDROCHLORIDE 10 MG/ML
0.5 INJECTION, SOLUTION EPIDURAL; INFILTRATION; INTRACAUDAL; PERINEURAL ONCE AS NEEDED
Status: DISCONTINUED | OUTPATIENT
Start: 2022-01-28 | End: 2022-01-28 | Stop reason: HOSPADM

## 2022-01-28 RX ORDER — NEOSTIGMINE METHYLSULFATE 5 MG/5 ML
SYRINGE (ML) INTRAVENOUS AS NEEDED
Status: DISCONTINUED | OUTPATIENT
Start: 2022-01-28 | End: 2022-01-28 | Stop reason: SURG

## 2022-01-28 RX ORDER — ROCURONIUM BROMIDE 10 MG/ML
INJECTION, SOLUTION INTRAVENOUS AS NEEDED
Status: DISCONTINUED | OUTPATIENT
Start: 2022-01-28 | End: 2022-01-28 | Stop reason: SURG

## 2022-01-28 RX ORDER — OXYCODONE AND ACETAMINOPHEN 7.5; 325 MG/1; MG/1
2 TABLET ORAL EVERY 4 HOURS PRN
Status: DISCONTINUED | OUTPATIENT
Start: 2022-01-28 | End: 2022-01-28 | Stop reason: HOSPADM

## 2022-01-28 RX ORDER — ATORVASTATIN CALCIUM 10 MG/1
10 TABLET, FILM COATED ORAL NIGHTLY
Status: DISCONTINUED | OUTPATIENT
Start: 2022-01-28 | End: 2022-01-29 | Stop reason: HOSPADM

## 2022-01-28 RX ORDER — ONDANSETRON 4 MG/1
4 TABLET, FILM COATED ORAL EVERY 6 HOURS PRN
Status: DISCONTINUED | OUTPATIENT
Start: 2022-01-28 | End: 2022-01-29 | Stop reason: HOSPADM

## 2022-01-28 RX ORDER — ONDANSETRON 2 MG/ML
4 INJECTION INTRAMUSCULAR; INTRAVENOUS ONCE AS NEEDED
Status: COMPLETED | OUTPATIENT
Start: 2022-01-28 | End: 2022-01-28

## 2022-01-28 RX ORDER — DEXAMETHASONE SODIUM PHOSPHATE 4 MG/ML
INJECTION, SOLUTION INTRA-ARTICULAR; INTRALESIONAL; INTRAMUSCULAR; INTRAVENOUS; SOFT TISSUE AS NEEDED
Status: DISCONTINUED | OUTPATIENT
Start: 2022-01-28 | End: 2022-01-28 | Stop reason: SURG

## 2022-01-28 RX ORDER — SODIUM CHLORIDE, SODIUM LACTATE, POTASSIUM CHLORIDE, CALCIUM CHLORIDE 600; 310; 30; 20 MG/100ML; MG/100ML; MG/100ML; MG/100ML
100 INJECTION, SOLUTION INTRAVENOUS CONTINUOUS
Status: DISCONTINUED | OUTPATIENT
Start: 2022-01-28 | End: 2022-01-28

## 2022-01-28 RX ORDER — FUROSEMIDE 40 MG/1
40 TABLET ORAL DAILY
Status: DISCONTINUED | OUTPATIENT
Start: 2022-01-28 | End: 2022-01-29 | Stop reason: HOSPADM

## 2022-01-28 RX ORDER — LABETALOL HYDROCHLORIDE 5 MG/ML
5 INJECTION, SOLUTION INTRAVENOUS
Status: DISCONTINUED | OUTPATIENT
Start: 2022-01-28 | End: 2022-01-28 | Stop reason: HOSPADM

## 2022-01-28 RX ORDER — SODIUM CHLORIDE 0.9 % (FLUSH) 0.9 %
3-10 SYRINGE (ML) INJECTION AS NEEDED
Status: DISCONTINUED | OUTPATIENT
Start: 2022-01-28 | End: 2022-01-28 | Stop reason: HOSPADM

## 2022-01-28 RX ORDER — SODIUM CHLORIDE 9 MG/ML
25 INJECTION, SOLUTION INTRAVENOUS CONTINUOUS
Status: DISCONTINUED | OUTPATIENT
Start: 2022-01-28 | End: 2022-01-29 | Stop reason: HOSPADM

## 2022-01-28 RX ORDER — SODIUM CHLORIDE 9 MG/ML
100 INJECTION, SOLUTION INTRAVENOUS CONTINUOUS
Status: DISCONTINUED | OUTPATIENT
Start: 2022-01-28 | End: 2022-01-28 | Stop reason: SDUPTHER

## 2022-01-28 RX ORDER — PHENYLEPHRINE HCL IN 0.9% NACL 1 MG/10 ML
SYRINGE (ML) INTRAVENOUS AS NEEDED
Status: DISCONTINUED | OUTPATIENT
Start: 2022-01-28 | End: 2022-01-28 | Stop reason: SURG

## 2022-01-28 RX ORDER — ATROPA BELLADONNA AND OPIUM 16.2; 3 MG/1; MG/1
30 SUPPOSITORY RECTAL DAILY PRN
Status: DISCONTINUED | OUTPATIENT
Start: 2022-01-28 | End: 2022-01-29 | Stop reason: HOSPADM

## 2022-01-28 RX ORDER — LIDOCAINE HYDROCHLORIDE 20 MG/ML
INJECTION, SOLUTION EPIDURAL; INFILTRATION; INTRACAUDAL; PERINEURAL AS NEEDED
Status: DISCONTINUED | OUTPATIENT
Start: 2022-01-28 | End: 2022-01-28 | Stop reason: SURG

## 2022-01-28 RX ORDER — SODIUM CHLORIDE, SODIUM LACTATE, POTASSIUM CHLORIDE, CALCIUM CHLORIDE 600; 310; 30; 20 MG/100ML; MG/100ML; MG/100ML; MG/100ML
1000 INJECTION, SOLUTION INTRAVENOUS CONTINUOUS
Status: DISCONTINUED | OUTPATIENT
Start: 2022-01-28 | End: 2022-01-28

## 2022-01-28 RX ORDER — BUPIVACAINE HCL/0.9 % NACL/PF 0.1 %
2 PLASTIC BAG, INJECTION (ML) EPIDURAL ONCE
Status: COMPLETED | OUTPATIENT
Start: 2022-01-28 | End: 2022-01-28

## 2022-01-28 RX ORDER — FLUMAZENIL 0.1 MG/ML
0.2 INJECTION INTRAVENOUS AS NEEDED
Status: DISCONTINUED | OUTPATIENT
Start: 2022-01-28 | End: 2022-01-28 | Stop reason: HOSPADM

## 2022-01-28 RX ORDER — FENTANYL CITRATE 50 UG/ML
25 INJECTION, SOLUTION INTRAMUSCULAR; INTRAVENOUS
Status: DISCONTINUED | OUTPATIENT
Start: 2022-01-28 | End: 2022-01-28 | Stop reason: HOSPADM

## 2022-01-28 RX ORDER — FENTANYL CITRATE 50 UG/ML
INJECTION, SOLUTION INTRAMUSCULAR; INTRAVENOUS AS NEEDED
Status: DISCONTINUED | OUTPATIENT
Start: 2022-01-28 | End: 2022-01-28 | Stop reason: SURG

## 2022-01-28 RX ORDER — PROPOFOL 10 MG/ML
VIAL (ML) INTRAVENOUS AS NEEDED
Status: DISCONTINUED | OUTPATIENT
Start: 2022-01-28 | End: 2022-01-28 | Stop reason: SURG

## 2022-01-28 RX ORDER — ACETAMINOPHEN 500 MG
1000 TABLET ORAL ONCE
Status: COMPLETED | OUTPATIENT
Start: 2022-01-28 | End: 2022-01-28

## 2022-01-28 RX ADMIN — GLYCOPYRROLATE 0.4 MG: 0.2 INJECTION, SOLUTION INTRAMUSCULAR; INTRAVENOUS at 08:01

## 2022-01-28 RX ADMIN — LIDOCAINE HYDROCHLORIDE 100 MG: 20 INJECTION, SOLUTION EPIDURAL; INFILTRATION; INTRACAUDAL; PERINEURAL at 07:21

## 2022-01-28 RX ADMIN — ACETAMINOPHEN 1000 MG: 500 TABLET ORAL at 06:38

## 2022-01-28 RX ADMIN — LIDOCAINE HYDROCHLORIDE 100 MG: 20 INJECTION, SOLUTION EPIDURAL; INFILTRATION; INTRACAUDAL; PERINEURAL at 07:00

## 2022-01-28 RX ADMIN — ONDANSETRON HYDROCHLORIDE 4 MG: 2 SOLUTION INTRAMUSCULAR; INTRAVENOUS at 17:54

## 2022-01-28 RX ADMIN — Medication 2 G: at 06:58

## 2022-01-28 RX ADMIN — FENTANYL CITRATE 25 MCG: 50 INJECTION, SOLUTION INTRAMUSCULAR; INTRAVENOUS at 14:25

## 2022-01-28 RX ADMIN — OXYCODONE HYDROCHLORIDE AND ACETAMINOPHEN 2 TABLET: 7.5; 325 TABLET ORAL at 09:04

## 2022-01-28 RX ADMIN — Medication 10 MCG: at 07:00

## 2022-01-28 RX ADMIN — PROPOFOL 150 MG: 10 INJECTION, EMULSION INTRAVENOUS at 07:00

## 2022-01-28 RX ADMIN — ONDANSETRON 4 MG: 2 INJECTION INTRAMUSCULAR; INTRAVENOUS at 08:56

## 2022-01-28 RX ADMIN — FENTANYL CITRATE 25 MCG: 50 INJECTION, SOLUTION INTRAMUSCULAR; INTRAVENOUS at 13:46

## 2022-01-28 RX ADMIN — LISINOPRIL 10 MG: 10 TABLET ORAL at 19:36

## 2022-01-28 RX ADMIN — ROCURONIUM BROMIDE 50 MG: 10 INJECTION INTRAVENOUS at 07:00

## 2022-01-28 RX ADMIN — ONDANSETRON 4 MG: 2 INJECTION INTRAMUSCULAR; INTRAVENOUS at 07:31

## 2022-01-28 RX ADMIN — DEXAMETHASONE SODIUM PHOSPHATE 4 MG: 4 INJECTION, SOLUTION INTRA-ARTICULAR; INTRALESIONAL; INTRAMUSCULAR; INTRAVENOUS; SOFT TISSUE at 07:31

## 2022-01-28 RX ADMIN — TAMSULOSIN HYDROCHLORIDE 0.4 MG: 0.4 CAPSULE ORAL at 19:37

## 2022-01-28 RX ADMIN — HYDROCODONE BITARTRATE AND ACETAMINOPHEN 1 TABLET: 7.5; 325 TABLET ORAL at 19:37

## 2022-01-28 RX ADMIN — Medication 10 MCG: at 07:35

## 2022-01-28 RX ADMIN — METFORMIN HYDROCHLORIDE 500 MG: 500 TABLET ORAL at 19:36

## 2022-01-28 RX ADMIN — SODIUM CHLORIDE 25 ML/HR: 9 INJECTION, SOLUTION INTRAVENOUS at 16:26

## 2022-01-28 RX ADMIN — FENTANYL CITRATE 25 MCG: 50 INJECTION, SOLUTION INTRAMUSCULAR; INTRAVENOUS at 13:59

## 2022-01-28 RX ADMIN — Medication 100 MCG: at 07:11

## 2022-01-28 RX ADMIN — Medication 10 MCG: at 07:20

## 2022-01-28 RX ADMIN — FENTANYL CITRATE 25 MCG: 50 INJECTION, SOLUTION INTRAMUSCULAR; INTRAVENOUS at 13:15

## 2022-01-28 RX ADMIN — FENTANYL CITRATE 100 MCG: 50 INJECTION, SOLUTION INTRAMUSCULAR; INTRAVENOUS at 08:13

## 2022-01-28 RX ADMIN — SODIUM CHLORIDE, POTASSIUM CHLORIDE, SODIUM LACTATE AND CALCIUM CHLORIDE 1000 ML: 600; 310; 30; 20 INJECTION, SOLUTION INTRAVENOUS at 06:11

## 2022-01-28 RX ADMIN — SODIUM CHLORIDE, POTASSIUM CHLORIDE, SODIUM LACTATE AND CALCIUM CHLORIDE 100 ML/HR: 600; 310; 30; 20 INJECTION, SOLUTION INTRAVENOUS at 09:20

## 2022-01-28 RX ADMIN — ATORVASTATIN CALCIUM 10 MG: 10 TABLET, FILM COATED ORAL at 21:15

## 2022-01-28 RX ADMIN — Medication 3 MG: at 08:01

## 2022-01-28 RX ADMIN — Medication 10 MCG: at 07:56

## 2022-01-28 NOTE — ANESTHESIA POSTPROCEDURE EVALUATION
Patient: Humberto Auguste Jr.    Procedure Summary     Date: 01/28/22 Room / Location:  [facility] OR 01 /  [facility]    Anesthesia Start: 0658 Anesthesia Stop: 0816    Procedure: TRANSURETHRAL RESECTION OF PROSTATE (N/A Bladder) Diagnosis:       Benign non-nodular prostatic hyperplasia with lower urinary tract symptoms      (Benign non-nodular prostatic hyperplasia with lower urinary tract symptoms [N40.1])    Surgeons: Camron Bowden MD Provider: FRANKLYN Mendoza CRNA    Anesthesia Type: general ASA Status: 2          Anesthesia Type: general    Vitals  Vitals Value Taken Time   /85 01/28/22 1456   Temp 97.6 °F (36.4 °C) 01/28/22 1443   Pulse 78 01/28/22 1458   Resp 15 01/28/22 1443   SpO2 97 % 01/28/22 1458   Vitals shown include unvalidated device data.        Post Anesthesia Care and Evaluation    Patient location during evaluation: PACU  Patient participation: complete - patient participated  Level of consciousness: awake and alert  Pain management: adequate  Airway patency: patent  Anesthetic complications: No anesthetic complications    Cardiovascular status: acceptable  Respiratory status: acceptable  Hydration status: acceptable    Comments: Blood pressure 123/85, pulse 85, temperature 97.6 °F (36.4 °C), resp. rate 15, SpO2 96 %.    Pt discharged from PACU based on goyo score >8

## 2022-01-28 NOTE — ANESTHESIA PROCEDURE NOTES
Airway  Urgency: elective    Date/Time: 1/28/2022 7:01 AM  Airway not difficult    General Information and Staff    Patient location during procedure: OR  CRNA: FRANKLYN Mendoza CRNA    Indications and Patient Condition  Indications for airway management: airway protection    Preoxygenated: yes  MILS maintained throughout  Mask difficulty assessment: 1 - vent by mask    Final Airway Details  Final airway type: endotracheal airway      Successful airway: ETT  Cuffed: yes   Successful intubation technique: direct laryngoscopy  Facilitating devices/methods: intubating stylet  Endotracheal tube insertion site: oral  Blade: Reyes  Blade size: 4  ETT size (mm): 7.5  Cormack-Lehane Classification: grade I - full view of glottis  Placement verified by: chest auscultation and capnometry   Cuff volume (mL): 5  Measured from: lips  ETT/EBT  to lips (cm): 21  Number of attempts at approach: 1  Assessment: lips, teeth, and gum same as pre-op and atraumatic intubation

## 2022-01-28 NOTE — ANESTHESIA PREPROCEDURE EVALUATION
Anesthesia Evaluation     Patient summary reviewed   no history of anesthetic complications:  NPO Solid Status: > 8 hours  NPO Liquid Status: > 6 hours           Airway   Mallampati: II  Dental      Pulmonary    (+) sleep apnea on CPAP,   Cardiovascular   Exercise tolerance: good (4-7 METS)    (+) hypertension, hyperlipidemia,   (-) pacemaker, valvular problems/murmurs, past MI, cardiac stents, CABG      Neuro/Psych- negative ROS  (-) seizures, CVA  GI/Hepatic/Renal/Endo    (+) obesity,   renal disease CRI, diabetes mellitus,   (-) liver disease    Musculoskeletal     Abdominal   (+) obese,    Substance History      OB/GYN          Other                          Anesthesia Plan    ASA 2     general     intravenous induction     Anesthetic plan, all risks, benefits, and alternatives have been provided, discussed and informed consent has been obtained with: patient.

## 2022-01-29 VITALS
DIASTOLIC BLOOD PRESSURE: 66 MMHG | RESPIRATION RATE: 16 BRPM | HEART RATE: 65 BPM | SYSTOLIC BLOOD PRESSURE: 106 MMHG | OXYGEN SATURATION: 96 % | TEMPERATURE: 98 F

## 2022-01-29 LAB
ANION GAP SERPL CALCULATED.3IONS-SCNC: 7 MMOL/L (ref 5–15)
BUN SERPL-MCNC: 25 MG/DL (ref 8–23)
BUN/CREAT SERPL: 22.1 (ref 7–25)
CALCIUM SPEC-SCNC: 8.7 MG/DL (ref 8.6–10.5)
CHLORIDE SERPL-SCNC: 105 MMOL/L (ref 98–107)
CO2 SERPL-SCNC: 30 MMOL/L (ref 22–29)
CREAT SERPL-MCNC: 1.13 MG/DL (ref 0.76–1.27)
DEPRECATED RDW RBC AUTO: 43.4 FL (ref 37–54)
ERYTHROCYTE [DISTWIDTH] IN BLOOD BY AUTOMATED COUNT: 12.5 % (ref 12.3–15.4)
GFR SERPL CREATININE-BSD FRML MDRD: 64 ML/MIN/1.73
GLUCOSE SERPL-MCNC: 135 MG/DL (ref 65–99)
HCT VFR BLD AUTO: 39.7 % (ref 37.5–51)
HGB BLD-MCNC: 12.4 G/DL (ref 13–17.7)
MCH RBC QN AUTO: 29.7 PG (ref 26.6–33)
MCHC RBC AUTO-ENTMCNC: 31.2 G/DL (ref 31.5–35.7)
MCV RBC AUTO: 95 FL (ref 79–97)
PLATELET # BLD AUTO: 154 10*3/MM3 (ref 140–450)
PMV BLD AUTO: 10.8 FL (ref 6–12)
POTASSIUM SERPL-SCNC: 4.1 MMOL/L (ref 3.5–5.2)
RBC # BLD AUTO: 4.18 10*6/MM3 (ref 4.14–5.8)
SODIUM SERPL-SCNC: 142 MMOL/L (ref 136–145)
WBC NRBC COR # BLD: 9.03 10*3/MM3 (ref 3.4–10.8)

## 2022-01-29 PROCEDURE — 63710000001 HYDROCODONE-ACETAMINOPHEN 7.5-325 MG TABLET: Performed by: UROLOGY

## 2022-01-29 PROCEDURE — A9270 NON-COVERED ITEM OR SERVICE: HCPCS | Performed by: UROLOGY

## 2022-01-29 PROCEDURE — 63710000001 TAMSULOSIN 0.4 MG CAPSULE: Performed by: UROLOGY

## 2022-01-29 PROCEDURE — 63710000001 FUROSEMIDE 40 MG TABLET: Performed by: UROLOGY

## 2022-01-29 PROCEDURE — 80048 BASIC METABOLIC PNL TOTAL CA: CPT | Performed by: UROLOGY

## 2022-01-29 PROCEDURE — 99024 POSTOP FOLLOW-UP VISIT: CPT | Performed by: UROLOGY

## 2022-01-29 PROCEDURE — 63710000001 METFORMIN 500 MG TABLET: Performed by: UROLOGY

## 2022-01-29 PROCEDURE — 85027 COMPLETE CBC AUTOMATED: CPT | Performed by: UROLOGY

## 2022-01-29 PROCEDURE — 63710000001 LISINOPRIL 10 MG TABLET: Performed by: UROLOGY

## 2022-01-29 RX ORDER — HYDROCODONE BITARTRATE AND ACETAMINOPHEN 7.5; 325 MG/1; MG/1
1 TABLET ORAL EVERY 6 HOURS PRN
Qty: 8 TABLET | Refills: 0 | Status: SHIPPED | OUTPATIENT
Start: 2022-01-29 | End: 2022-02-01 | Stop reason: SDUPTHER

## 2022-01-29 RX ADMIN — TAMSULOSIN HYDROCHLORIDE 0.4 MG: 0.4 CAPSULE ORAL at 09:10

## 2022-01-29 RX ADMIN — HYDROCODONE BITARTRATE AND ACETAMINOPHEN 1 TABLET: 7.5; 325 TABLET ORAL at 04:58

## 2022-01-29 RX ADMIN — HYDROCODONE BITARTRATE AND ACETAMINOPHEN 1 TABLET: 7.5; 325 TABLET ORAL at 12:38

## 2022-01-29 RX ADMIN — FUROSEMIDE 40 MG: 40 TABLET ORAL at 09:10

## 2022-01-29 RX ADMIN — LISINOPRIL 10 MG: 10 TABLET ORAL at 09:10

## 2022-01-29 RX ADMIN — METFORMIN HYDROCHLORIDE 500 MG: 500 TABLET ORAL at 09:10

## 2022-02-01 ENCOUNTER — OFFICE VISIT (OUTPATIENT)
Dept: UROLOGY | Facility: CLINIC | Age: 72
End: 2022-02-01

## 2022-02-01 VITALS — WEIGHT: 285 LBS | BODY MASS INDEX: 36.57 KG/M2 | HEIGHT: 74 IN | TEMPERATURE: 97.9 F

## 2022-02-01 DIAGNOSIS — N40.1 BENIGN NON-NODULAR PROSTATIC HYPERPLASIA WITH LOWER URINARY TRACT SYMPTOMS: ICD-10-CM

## 2022-02-01 DIAGNOSIS — N40.1 BENIGN NON-NODULAR PROSTATIC HYPERPLASIA WITH LOWER URINARY TRACT SYMPTOMS: Primary | ICD-10-CM

## 2022-02-01 PROCEDURE — 99024 POSTOP FOLLOW-UP VISIT: CPT | Performed by: UROLOGY

## 2022-02-01 RX ORDER — HYDROCODONE BITARTRATE AND ACETAMINOPHEN 7.5; 325 MG/1; MG/1
1 TABLET ORAL EVERY 6 HOURS PRN
Qty: 8 TABLET | Refills: 0 | Status: SHIPPED | OUTPATIENT
Start: 2022-02-01 | End: 2022-02-10

## 2022-02-01 NOTE — TELEPHONE ENCOUNTER
Prescription for Norco called in.  Electronically signed by Camron Bowden MD, 02/01/22, 4:29 PM CST.

## 2022-02-01 NOTE — TELEPHONE ENCOUNTER
----- Message from Marlyn Canales sent at 2/1/2022  3:07 PM CST -----  Pt was here today he is still having a lot of pain from his procedure and is wanting a refill on his norco's. What would you like to do?

## 2022-02-01 NOTE — TELEPHONE ENCOUNTER
Caller: PT    Relationship: SELF    Best call back number: [phone] AFTER 9AM; OK TO LVM    Requested Prescriptions:   Requested Prescriptions     Pending Prescriptions Disp Refills   • HYDROcodone-acetaminophen (NORCO) 7.5-325 MG per tablet 8 tablet 0     Sig: Take 1 tablet by mouth Every 6 (Six) Hours As Needed for Moderate Pain  for up to 9 days.        Pharmacy where request should be sent:    [facility]       Does the patient have less than a 3 day supply:  [x] Yes  [] No    Ellen Jimenez Rep   02/01/22 14:24 CST

## 2022-02-01 NOTE — H&P
Urology H&P    Mr. Auguste is 71 y.o. male    CHIEF COMPLAINT: BPH with MARTINES symptoms    HPI  Here for TURP. No dysuria    The following portions of the patient's history were reviewed and updated as appropriate: allergies, current medications, past family history, past medical history, past social history, past surgical history and problem list.    Review of Systems  Review  of systems  Constitutional: Negative for chills and fever.   Gastrointestinal: Negative for abdominal pain, anal bleeding and blood in stool.   Genitourinary: Negative for flank pain and hematuria.    /Camron Bowden MD        No medications prior to admission.       No current facility-administered medications for this encounter.    Current Outpatient Medications:   •  Cod Liver Oil 1000 MG capsule, Take 2 capsules by mouth Daily., Disp: , Rfl:   •  furosemide (LASIX) 40 MG tablet, Take 40 mg by mouth Daily As Needed., Disp: , Rfl:   •  lisinopril (PRINIVIL,ZESTRIL) 10 MG tablet, Take 10 mg by mouth Daily., Disp: , Rfl:   •  metFORMIN (GLUCOPHAGE) 500 MG tablet, Take 500 mg by mouth 2 (Two) Times a Day With Meals., Disp: , Rfl:   •  simvastatin (ZOCOR) 20 MG tablet, Take 40 mg by mouth Every Night., Disp: , Rfl:   •  tamsulosin (FLOMAX) 0.4 MG capsule 24 hr capsule, Take 1 capsule by mouth Daily. (Patient taking differently: Take 2 capsules by mouth Daily.), Disp: 90 capsule, Rfl: 3  •  HYDROcodone-acetaminophen (NORCO) 7.5-325 MG per tablet, Take 1 tablet by mouth Every 6 (Six) Hours As Needed for Moderate Pain  for up to 9 days., Disp: 8 tablet, Rfl: 0  •  sildenafil (REVATIO) 20 MG tablet, TAKE 1 TO 4 TABLETS BY MOUTH 1 TO 2 HOURS BEFORE SEXUAL ACTIVITY AS NEEDED, Disp: 15 tablet, Rfl: 11    Past Medical History:   Diagnosis Date   • Arthritis    • Diabetes mellitus (HCC)    • Elevated cholesterol    • Enlarged prostate    • Hypertension    • Rotator cuff disorder, right    • Sleep apnea        Past Surgical History:   Procedure Laterality  Date   • COLONOSCOPY  06/02/2016    polyp removed from 95 cm.a long ot1ofospky colon not allowing mitchell safr colonoscopy   • COLONOSCOPY N/A 6/8/2021    Procedure: COLONOSCOPY WITH ANESTHESIA;  Surgeon: Rick Dixon DO;  Location:  [facility];  Service: Gastroenterology;  Laterality: N/A;  pre op: hx polyps  post op:normal  PCP: Feliciano Kinsey MD   • CYSTOSCOPY TRANSURETHRAL RESECTION OF PROSTATE N/A 1/28/2022    Procedure: TRANSURETHRAL RESECTION OF PROSTATE;  Surgeon: Camron Bowden MD;  Location:  [facility] OR;  Service: Urology;  Laterality: N/A;   • HERNIA REPAIR     • SHOULDER ARTHROSCOPY W/ ROTATOR CUFF REPAIR Right 3/17/2020    Procedure: RIGHT SHOULDER  ROTATOR CUFF REPAIR, SUBACROMIAL DECOMPRESSION;  Surgeon: Alphonso Lundberg MD;  Location:  [facility] OR;  Service: Orthopedics;  Laterality: Right;   • VARICOSE VEIN SURGERY         Social History     Socioeconomic History   • Marital status:    Tobacco Use   • Smoking status: Former Smoker     Years: 40.00     Types: Cigarettes   • Smokeless tobacco: Never Used   • Tobacco comment: Stopped 12/15/2015   Vaping Use   • Vaping Use: Never used   Substance and Sexual Activity   • Alcohol use: No   • Drug use: No   • Sexual activity: Defer       Family History   Problem Relation Age of Onset   • No Known Problems Father    • No Known Problems Mother    • Colon cancer Neg Hx    • Colon polyps Neg Hx    • Esophageal cancer Neg Hx        /66 (BP Location: Left arm, Patient Position: Sitting)   Pulse 65   Temp 98 °F (36.7 °C) (Oral)   Resp 16   SpO2 96%     Physical Exam  Constitutional: Patient is without distress or deformity.  Vital signs are reviewed as above.    Neuro: No confusion; No disorientation; Alert and oriented  Pulmonary: No respiratory distress.   Skin: No pallor or diaphoresis  GI: Soft. The patient exhibits no distension and no mass. There is no tenderness. There is no rebound and no guarding. No hernia.   :  No CVA tenderness over kidneys.  Bladder not palpably distended.  Psychiatric:  normal mood and affect. Not agitated.         Lab Results   Component Value Date    GLUCOSE 135 (H) 01/29/2022    BUN 25 (H) 01/29/2022    CREATININE 1.13 01/29/2022    EGFRIFNONA 64 01/29/2022    EGFRIFAFRI >59 12/27/2021    BCR 22.1 01/29/2022    CO2 30.0 (H) 01/29/2022    CALCIUM 8.7 01/29/2022    ALBUMIN 4.2 12/27/2021    AST 12 12/27/2021    ALT 10 12/27/2021     Lab Results   Component Value Date    GLUCOSE 135 (H) 01/29/2022    CALCIUM 8.7 01/29/2022     01/29/2022    K 4.1 01/29/2022    CO2 30.0 (H) 01/29/2022     01/29/2022    BUN 25 (H) 01/29/2022    CREATININE 1.13 01/29/2022    EGFRIFAFRI >59 12/27/2021    EGFRIFNONA 64 01/29/2022    BCR 22.1 01/29/2022    ANIONGAP 7.0 01/29/2022     Lab Results   Component Value Date    WBC 9.03 01/29/2022    HGB 12.4 (L) 01/29/2022    HCT 39.7 01/29/2022    MCV 95.0 01/29/2022     01/29/2022     Lab Results   Component Value Date    PSA 1.73 07/20/2021    PSA 1.57 12/13/2019    PSA 1.32 11/27/2018     No results found for: URINECX  Brief Urine Lab Results  (Last result in the past 365 days)      Color   Clarity   Blood   Leuk Est   Nitrite   Protein   CREAT   Urine HCG        11/22/21 1438 Yellow   Clear   Negative   Negative   Negative   Negative                 Assessment and Plan  BPH with LUTS    -Plan TURP  (Please note that portions of this note were completed with a voice recognition program.)  Camron Bowden MD  02/01/22  07:37 CST

## 2022-02-01 NOTE — PROGRESS NOTES
UROLOGY  POD # 4    Procedure: TURP    Symptoms: Some hematuria with catheter      Current Outpatient Medications:   •  Cod Liver Oil 1000 MG capsule, Take 2 capsules by mouth Daily., Disp: , Rfl:   •  furosemide (LASIX) 40 MG tablet, Take 40 mg by mouth Daily As Needed., Disp: , Rfl:   •  HYDROcodone-acetaminophen (NORCO) 7.5-325 MG per tablet, Take 1 tablet by mouth Every 6 (Six) Hours As Needed for Moderate Pain  for up to 9 days., Disp: 8 tablet, Rfl: 0  •  lisinopril (PRINIVIL,ZESTRIL) 10 MG tablet, Take 10 mg by mouth Daily., Disp: , Rfl:   •  metFORMIN (GLUCOPHAGE) 500 MG tablet, Take 500 mg by mouth 2 (Two) Times a Day With Meals., Disp: , Rfl:   •  sildenafil (REVATIO) 20 MG tablet, TAKE 1 TO 4 TABLETS BY MOUTH 1 TO 2 HOURS BEFORE SEXUAL ACTIVITY AS NEEDED, Disp: 15 tablet, Rfl: 11  •  simvastatin (ZOCOR) 20 MG tablet, Take 40 mg by mouth Every Night., Disp: , Rfl:   •  tamsulosin (FLOMAX) 0.4 MG capsule 24 hr capsule, Take 1 capsule by mouth Daily. (Patient taking differently: Take 2 capsules by mouth Daily.), Disp: 90 capsule, Rfl: 3    There were no vitals taken for this visit.          Data:       Plan: Cath removal today.  Follow-up in a month  Camron Bowden MD  2/1/2022  06:54 CST

## 2022-02-02 DIAGNOSIS — N39.0 URINARY TRACT INFECTION WITHOUT HEMATURIA, SITE UNSPECIFIED: Primary | ICD-10-CM

## 2022-02-02 LAB
CYTO UR: NORMAL
LAB AP CASE REPORT: NORMAL
PATH REPORT.FINAL DX SPEC: NORMAL
PATH REPORT.GROSS SPEC: NORMAL

## 2022-02-02 RX ORDER — CEFDINIR 300 MG/1
300 CAPSULE ORAL 2 TIMES DAILY
Qty: 10 CAPSULE | Refills: 0 | Status: SHIPPED | OUTPATIENT
Start: 2022-02-02 | End: 2022-06-16

## 2022-02-02 NOTE — PROGRESS NOTES
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 He has pT1a disease. Will repeat a PSA in about six months. Will do an MRI in a one year which will require him to have an anesthetic.

## 2022-03-08 ENCOUNTER — OFFICE VISIT (OUTPATIENT)
Dept: UROLOGY | Facility: CLINIC | Age: 72
End: 2022-03-08

## 2022-03-08 VITALS — WEIGHT: 272 LBS | TEMPERATURE: 98.2 F | BODY MASS INDEX: 34.91 KG/M2 | HEIGHT: 74 IN

## 2022-03-08 DIAGNOSIS — C61 PROSTATE CANCER: ICD-10-CM

## 2022-03-08 DIAGNOSIS — N40.1 BENIGN NON-NODULAR PROSTATIC HYPERPLASIA WITH LOWER URINARY TRACT SYMPTOMS: Primary | ICD-10-CM

## 2022-03-08 PROCEDURE — 99024 POSTOP FOLLOW-UP VISIT: CPT | Performed by: UROLOGY

## 2022-03-08 NOTE — PROGRESS NOTES
"UROLOGY  POD # 39      Procedure: TURP  Tissue Pathology Exam (01/28/2022 07:19)   Op Note by Camron Bowden MD (01/28/2022 07:17)     Symptoms: Improved voiding symptoms       Current Outpatient Medications:   •  Cod Liver Oil 1000 MG capsule, Take 2 capsules by mouth Daily., Disp: , Rfl:   •  furosemide (LASIX) 40 MG tablet, Take 40 mg by mouth Daily As Needed., Disp: , Rfl:   •  lisinopril (PRINIVIL,ZESTRIL) 10 MG tablet, Take 10 mg by mouth Daily., Disp: , Rfl:   •  metFORMIN (GLUCOPHAGE) 500 MG tablet, Take 500 mg by mouth 2 (Two) Times a Day With Meals., Disp: , Rfl:   •  sildenafil (REVATIO) 20 MG tablet, TAKE 1 TO 4 TABLETS BY MOUTH 1 TO 2 HOURS BEFORE SEXUAL ACTIVITY AS NEEDED, Disp: 15 tablet, Rfl: 11  •  simvastatin (ZOCOR) 20 MG tablet, Take 40 mg by mouth Every Night., Disp: , Rfl:   •  tamsulosin (FLOMAX) 0.4 MG capsule 24 hr capsule, Take 1 capsule by mouth Daily. (Patient taking differently: Take 2 capsules by mouth Daily.), Disp: 90 capsule, Rfl: 3  •  cefdinir (OMNICEF) 300 MG capsule, Take 1 capsule by mouth 2 (Two) Times a Day., Disp: 10 capsule, Rfl: 0    Temp 98.2 °F (36.8 °C)   Ht 187.6 cm (73.86\")   Wt 123 kg (272 lb)   BMI 35.06 kg/m²         Data:     Bladder Scan interpretation  Estimation of residual urine via abdominal ultrasound  Residual Urine: 78 ml  Indication: BPH with obstruction  Position: Supine  Examination: Incremental scanning of the suprapubic area using 3 MHz transducer using copious amounts of acoustic gel.   Findings: An anechoic area was demonstrated which represented the bladder, with measurement of residual urine as noted. I inspected this myself. In that the residual urine was stable or insignificant, no treatment will be necessary at this time.       Plan: f/u 6 months with PSA    Camron Bowden MD  3/8/2022  12:58 CST      "

## 2022-06-07 DIAGNOSIS — Z01.818 PRE-OPERATIVE EXAMINATION: Primary | ICD-10-CM

## 2022-06-10 ENCOUNTER — APPOINTMENT (OUTPATIENT)
Dept: LAB | Facility: HOSPITAL | Age: 72
End: 2022-06-10

## 2022-06-13 ENCOUNTER — LAB (OUTPATIENT)
Dept: LAB | Facility: HOSPITAL | Age: 72
End: 2022-06-13

## 2022-06-13 DIAGNOSIS — Z01.818 PRE-OPERATIVE EXAMINATION: ICD-10-CM

## 2022-06-13 LAB — SARS-COV-2 ORF1AB RESP QL NAA+PROBE: NOT DETECTED

## 2022-06-13 PROCEDURE — U0004 COV-19 TEST NON-CDC HGH THRU: HCPCS

## 2022-06-13 PROCEDURE — U0005 INFEC AGEN DETEC AMPLI PROBE: HCPCS

## 2022-06-13 PROCEDURE — C9803 HOPD COVID-19 SPEC COLLECT: HCPCS

## 2022-06-15 NOTE — PROGRESS NOTES
YOB: 1950  Location: [facility]  Location Address: [address] [place] [address], [facility], [address] [place] [address]  Location Phone: [phone]    Chief Complaint   Patient presents with   • Neck Mass       History of Present Illness  Humberto Auguste Jr. is a 71 y.o. male.  Humberto Auguste Jr. is here for evaluation of ENT complaints. The patient has had problems with enlarged lymph node to left jawline  Patient states he noticed the area in April. This has not been painful.   He feels as if the area has stayed the same size since he noticed it.      He quit smoking dec 2015       HEAD NECK SOFT TISSUE THYROID (2022 15:07 EDT)    Past Medical History:   Diagnosis Date   • Arthritis    • Diabetes mellitus (HCC)    • Elevated cholesterol    • Enlarged prostate    • Hypertension    • Rotator cuff disorder, right    • Sleep apnea        Past Surgical History:   Procedure Laterality Date   • COLONOSCOPY  2016    polyp removed from 95 cm.a long fi1qvdneyr colon not allowing mitchell safr colonoscopy   • COLONOSCOPY N/A 2021    Procedure: COLONOSCOPY WITH ANESTHESIA;  Surgeon: Rick Dixon DO;  Location: [facility];  Service: Gastroenterology;  Laterality: N/A;  pre op: hx polyps  post op:normal  PCP: Feliciano Kinsey MD   • CYSTOSCOPY TRANSURETHRAL RESECTION OF PROSTATE N/A 2022    Procedure: TRANSURETHRAL RESECTION OF PROSTATE;  Surgeon: Camron Bowden MD;  Location: [facility] OR;  Service: Urology;  Laterality: N/A;   • HERNIA REPAIR     • SHOULDER ARTHROSCOPY W/ ROTATOR CUFF REPAIR Right 3/17/2020    Procedure: RIGHT SHOULDER  ROTATOR CUFF REPAIR, SUBACROMIAL DECOMPRESSION;  Surgeon: Alphonso Lundberg MD;  Location: [facility] OR;  Service: Orthopedics;  Laterality: Right;   • VARICOSE VEIN SURGERY         Outpatient Medications Marked as Taking for the 22 encounter (Office Visit) with Benny Castellanos MD   Medication Sig Dispense Refill   • Cod Liver  Oil 1000 MG capsule Take 2 capsules by mouth Daily.     • furosemide (LASIX) 40 MG tablet Take 40 mg by mouth Daily As Needed.     • lisinopril (PRINIVIL,ZESTRIL) 10 MG tablet Take 10 mg by mouth Daily.     • metFORMIN (GLUCOPHAGE) 500 MG tablet Take 500 mg by mouth 2 (Two) Times a Day With Meals.     • sildenafil (REVATIO) 20 MG tablet TAKE 1 TO 4 TABLETS BY MOUTH 1 TO 2 HOURS BEFORE SEXUAL ACTIVITY AS NEEDED 15 tablet 11   • simvastatin (ZOCOR) 20 MG tablet Take 40 mg by mouth Every Night.     • tamsulosin (FLOMAX) 0.4 MG capsule 24 hr capsule Take 1 capsule by mouth Daily. (Patient taking differently: Take 2 capsules by mouth Daily.) 90 capsule 3       Percocet [oxycodone-acetaminophen]    Family History   Problem Relation Age of Onset   • No Known Problems Father    • No Known Problems Mother    • Colon cancer Neg Hx    • Colon polyps Neg Hx    • Esophageal cancer Neg Hx        Social History     Socioeconomic History   • Marital status:    Tobacco Use   • Smoking status: Former Smoker     Years: 40.00     Types: Cigarettes     Quit date: 12/15/2015     Years since quittin.5   • Smokeless tobacco: Never Used   • Tobacco comment: Stopped 12/15/2015   Vaping Use   • Vaping Use: Never used   Substance and Sexual Activity   • Alcohol use: No   • Drug use: No   • Sexual activity: Defer       Review of Systems   Constitutional: Negative.    HENT:        Admits left neck lymph node enlargement      Respiratory: Negative.    Cardiovascular: Negative.    Gastrointestinal: Negative.    Endocrine: Negative.    Genitourinary: Negative.    Musculoskeletal: Negative.    Allergic/Immunologic: Negative.    Neurological: Negative.        Vitals:    22 1156   BP: 133/81   Pulse: 80   Temp: 98.4 °F (36.9 °C)       Body mass index is 35.06 kg/m².    Objective     Physical Exam  Vitals reviewed.   Constitutional:       Appearance: He is obese.   HENT:      Head: Normocephalic.      Right Ear: Hearing and external  ear normal.      Left Ear: Hearing and external ear normal.      Nose: Nose normal.      Mouth/Throat:      Comments: No masses or lesions appreciated by visualization or manual palpation     Torus mandibularis noted     Neck:      Comments: Palpable mobile level II left lymph node     Neurological:      Mental Status: He is alert.         Assessment & Plan   Diagnoses and all orders for this visit:    1. Lymphadenopathy of head and neck region (Primary)  -     CT Soft Tissue Neck With & Without Contrast; Future      * Surgery not found *  Orders Placed This Encounter   Procedures   • CT Soft Tissue Neck With & Without Contrast     Standing Status:   Future     Standing Expiration Date:   2023     Order Specific Question:   Release to patient     Answer:   Immediate     Return in about 3 weeks (around 2022) for Recheck.     Will obtain ct soft tissue neck     Patient Instructions   CONTACT INFORMATION:  The main office phone number is [phone]. For emergencies after hours and on weekends, this number will convert over to our answering service and the on call provider will answer. Please try to keep non emergent phone calls/ questions to office hours 9am-5pm Monday through Friday.      [employer]  As an alternative, you can sign up and use the Epic MyChart system for more direct and quicker access for non emergent questions/ problems.  [employer] allows you to send messages to your doctor, view your test results, renew your prescriptions, schedule appointments, and more. To sign up, go to [employer] and click on the Sign Up Now link in the New User? box. Enter your [employer] Activation Code exactly as it appears below along with the last four digits of your Social Security Number and your Date of Birth () to complete the sign-up process. If you do not sign up before the expiration date, you must request a new code.     [employer] Activation Code: Activation code not  generated  Current [employer] Status: Active     If you have questions, you can email [email or web address] or call [phone] to talk to our MyChart staff. Remember, MyChart is NOT to be used for urgent needs. For medical emergencies, dial 911.     IF YOU SMOKE OR USE TOBACCO PLEASE READ THE FOLLOWING:  Why is smoking bad for me?  Smoking increases the risk of heart disease, lung disease, vascular disease, stroke, and cancer. If you smoke, STOP!        IF YOU SMOKE OR USE TOBACCO PLEASE READ THE FOLLOWING:  Why is smoking bad for me?  Smoking increases the risk of heart disease, lung disease, vascular disease, stroke, and cancer. If you smoke, STOP!     For more information:  Quit Now [facility]  1-800-QUIT-NOW  [email or web address]

## 2022-06-16 ENCOUNTER — OFFICE VISIT (OUTPATIENT)
Dept: OTOLARYNGOLOGY | Facility: CLINIC | Age: 72
End: 2022-06-16

## 2022-06-16 VITALS
HEART RATE: 80 BPM | SYSTOLIC BLOOD PRESSURE: 133 MMHG | DIASTOLIC BLOOD PRESSURE: 81 MMHG | BODY MASS INDEX: 34.91 KG/M2 | HEIGHT: 74 IN | TEMPERATURE: 98.4 F | WEIGHT: 272 LBS

## 2022-06-16 DIAGNOSIS — R59.0 LYMPHADENOPATHY OF HEAD AND NECK REGION: Primary | ICD-10-CM

## 2022-06-16 PROCEDURE — 99213 OFFICE O/P EST LOW 20 MIN: CPT | Performed by: NURSE PRACTITIONER

## 2022-06-16 NOTE — PATIENT INSTRUCTIONS
CONTACT INFORMATION:  The main office phone number is [phone]. For emergencies after hours and on weekends, this number will convert over to our answering service and the on call provider will answer. Please try to keep non emergent phone calls/ questions to office hours 9am-5pm Monday through Friday.      [employer]  As an alternative, you can sign up and use the Epic MyChart system for more direct and quicker access for non emergent questions/ problems.  [employer] allows you to send messages to your doctor, view your test results, renew your prescriptions, schedule appointments, and more. To sign up, go to [employer] and click on the Sign Up Now link in the New User? box. Enter your [employer] Activation Code exactly as it appears below along with the last four digits of your Social Security Number and your Date of Birth () to complete the sign-up process. If you do not sign up before the expiration date, you must request a new code.     [employer] Activation Code: Activation code not generated  Current [employer] Status: Active     If you have questions, you can email [email or web address] [email or web address] or call [phone] to talk to our [employer] staff. Remember, [employer] is NOT to be used for urgent needs. For medical emergencies, dial 911.     IF YOU SMOKE OR USE TOBACCO PLEASE READ THE FOLLOWING:  Why is smoking bad for me?  Smoking increases the risk of heart disease, lung disease, vascular disease, stroke, and cancer. If you smoke, STOP!        IF YOU SMOKE OR USE TOBACCO PLEASE READ THE FOLLOWING:  Why is smoking bad for me?  Smoking increases the risk of heart disease, lung disease, vascular disease, stroke, and cancer. If you smoke, STOP!     For more information:  Quit Now [place]  -QUIT-NOW  [email or web address]

## 2022-06-24 ENCOUNTER — HOSPITAL ENCOUNTER (OUTPATIENT)
Dept: CT IMAGING | Facility: HOSPITAL | Age: 72
Discharge: HOME | End: 2022-06-24
Admitting: NURSE PRACTITIONER

## 2022-06-24 DIAGNOSIS — R59.0 LYMPHADENOPATHY OF HEAD AND NECK REGION: ICD-10-CM

## 2022-06-24 PROCEDURE — 25010000002 IOPAMIDOL 61 % SOLUTION: Performed by: NURSE PRACTITIONER

## 2022-06-24 PROCEDURE — 70492 CT SFT TSUE NCK W/O & W/DYE: CPT

## 2022-06-24 PROCEDURE — 82565 ASSAY OF CREATININE: CPT

## 2022-06-24 RX ADMIN — IOPAMIDOL 100 ML: 612 INJECTION, SOLUTION INTRAVENOUS at 12:30

## 2022-06-27 LAB — CREAT BLDA-MCNC: 1.2 MG/DL (ref 0.6–1.3)

## 2022-07-11 NOTE — PROGRESS NOTES
YOB: 1950  Location: [facility]  Location Address: [address] [place] [address], [facility], [address] [place] [address]  Location Phone: [phone]    Chief Complaint   Patient presents with   • enlarged lymph node       History of Present Illness  Humberto Auguste Jr. is a 72 y.o. male.  Humberto Auguste Jr. is here for follow up of ENT complaints. The patient has had problems with enlarged lymph node to the left submandibular area. This has been present since April. He declines pain to the area.    Patient reports that he had covid in January.    Declines dysphagia, hoarseness, and globus sensation.     Study Result    Narrative & Impression   EXAM/TECHNIQUE: CT soft tissue neck without and with IV contrast     INDICATION: lymphadenopathy; R59.0-Localized enlarged lymph nodes     COMPARISON: None available.     DLP: 623 mGy cm. Automated exposure control was also utilized to  decrease patient radiation dose.     FINDINGS:     A marker is placed over the area of clinical concern which corresponds  to the LEFT submandibular region. At this site, 2 mildly enlarged LEFT  submandibular level 1 lymph nodes are present. On image 71 a lymph node  this region measures 9 x 14 mm. On image 65 in this region, a lymph node  measures 16 x 6 mm. No other prominent or enlarged cervical lymph nodes  identified.     The parapharyngeal fat planes are maintained. No focal asymmetry or  abnormal enhancement in the aerodigestive tract. Parotid glands appear  normal. Submandibular glands appear normal. Small subcentimeter thyroid  nodules are present.     Included portion of the intracranial cavity is unremarkable. No acute  orbital finding. Mild carotid artery atherosclerotic calcification.  Included lung apices are clear. Mastoid air cells are clear. Paranasal  sinuses are clear. Moderate multilevel cervical spine degenerative  change.     IMPRESSION:     2 mildly enlarged LEFT submandibular lymph nodes are present at the  area  of clinical concern. These are likely reactive to an infectious or  inflammatory process but warrant clinical follow-up to ensure  resolution.  This report was finalized on 06/24/2022 13:09 by Dr. Inder Brambila MD          Past Medical History:   Diagnosis Date   • Arthritis    • Diabetes mellitus (HCC)    • Elevated cholesterol    • Enlarged prostate    • Hypertension    • Rotator cuff disorder, right    • Sleep apnea        Past Surgical History:   Procedure Laterality Date   • COLONOSCOPY  06/02/2016    polyp removed from 95 cm.a long [id number] colon not allowing mitchell safr colonoscopy   • COLONOSCOPY N/A 6/8/2021    Procedure: COLONOSCOPY WITH ANESTHESIA;  Surgeon: Rick Dixon DO;  Location:  [facility];  Service: Gastroenterology;  Laterality: N/A;  pre op: hx polyps  post op:normal  PCP: Feliciano Kinsey MD   • CYSTOSCOPY TRANSURETHRAL RESECTION OF PROSTATE N/A 1/28/2022    Procedure: TRANSURETHRAL RESECTION OF PROSTATE;  Surgeon: Camron Bowden MD;  Location:  [facility] OR;  Service: Urology;  Laterality: N/A;   • HERNIA REPAIR     • SHOULDER ARTHROSCOPY W/ ROTATOR CUFF REPAIR Right 3/17/2020    Procedure: RIGHT SHOULDER  ROTATOR CUFF REPAIR, SUBACROMIAL DECOMPRESSION;  Surgeon: Alphonso Lundberg MD;  Location:  [facility] OR;  Service: Orthopedics;  Laterality: Right;   • VARICOSE VEIN SURGERY         Outpatient Medications Marked as Taking for the 7/12/22 encounter (Office Visit) with Benny Castellanos MD   Medication Sig Dispense Refill   • Cod Liver Oil 1000 MG capsule Take 2 capsules by mouth Daily.     • furosemide (LASIX) 40 MG tablet Take 40 mg by mouth Daily As Needed.     • lisinopril (PRINIVIL,ZESTRIL) 10 MG tablet Take 10 mg by mouth Daily.     • metFORMIN (GLUCOPHAGE) 500 MG tablet Take 500 mg by mouth 2 (Two) Times a Day With Meals.     • sildenafil (REVATIO) 20 MG tablet TAKE 1 TO 4 TABLETS BY MOUTH 1 TO 2 HOURS BEFORE SEXUAL ACTIVITY AS NEEDED 15 tablet 11   •  simvastatin (ZOCOR) 20 MG tablet Take 40 mg by mouth Every Night.     • tamsulosin (FLOMAX) 0.4 MG capsule 24 hr capsule Take 1 capsule by mouth Daily. (Patient taking differently: Take 2 capsules by mouth Daily.) 90 capsule 3       Percocet [oxycodone-acetaminophen]    Family History   Problem Relation Age of Onset   • Diabetes Father         Type 2 diabetic; hes 91   • No Known Problems Mother    • Cancer Maternal Grandfather          in  of prostrate cancer; he was 81   • Diabetes Paternal Grandfather           of pneumonia; was 70; type 2 diabetes   • Cancer Paternal Grandmother          of lung cancer ; he was 67   • Cancer Paternal Uncle         He  of leukemia; ; he was 49   • Colon cancer Neg Hx    • Colon polyps Neg Hx    • Esophageal cancer Neg Hx        Social History     Socioeconomic History   • Marital status:    Tobacco Use   • Smoking status: Former Smoker     Packs/day: 1.00     Years: 40.00     Pack years: 40.00     Types: Cigarettes, Cigarettes     Start date: 1967     Quit date: 12/15/2015     Years since quittin.5   • Smokeless tobacco: Never Used   • Tobacco comment: Stopped 12/15/2015   Vaping Use   • Vaping Use: Never used   Substance and Sexual Activity   • Alcohol use: No   • Drug use: No   • Sexual activity: Defer       Review of Systems   Constitutional: Negative.    HENT:        Admits enlarged lymph node of the left jawline   Respiratory: Negative.    Cardiovascular: Negative.    Gastrointestinal: Negative.    Genitourinary: Negative.    Musculoskeletal: Negative.    Skin: Negative.    Neurological: Negative.    Hematological: Negative.        Vitals:    22 1514   BP: 133/71   Pulse: (!) 123   Resp: 16   Temp: 97.3 °F (36.3 °C)       Body mass index is 36 kg/m².    Objective     Physical Exam  Vitals reviewed.   HENT:      Head: Normocephalic.      Right Ear: Hearing and external ear normal.      Left Ear: Hearing and external ear  normal.      Nose: Nose normal.      Mouth/Throat:      Lips: Pink.      Mouth: Mucous membranes are moist.      Pharynx: Oropharynx is clear. Uvula midline.   Neck:     Neurological:      Mental Status: He is alert.         Assessment & Plan   Diagnoses and all orders for this visit:    1. Lymphadenopathy of head and neck region (Primary)      * Surgery not found *  No orders of the defined types were placed in this encounter.    Return in about 3 months (around 10/12/2022) for Recheck.     Discussed that lymph node appears to be reactive, likely due to recent covid infection.   Patient to call if the lymph node is enlarging, if any difficulty swallowing, voice change, or if any other problems/concerns occur.    Dr. Castellanos examined and discussed care with patient and agrees with treatment plan.     Patient Instructions   Patient to call if the lymph node is enlarging, if any difficulty swallowing, voice change, or if any other problems/concerns occur.    CONTACT INFORMATION:  The main office phone number is [phone]. For emergencies after hours and on weekends, this number will convert over to our answering service and the on call provider will answer. Please try to keep non emergent phone calls/ questions to office hours 9am-5pm Monday through Friday.      GeeYee  As an alternative, you can sign up and use the Epic MyChart system for more direct and quicker access for non emergent questions/ problems.  [facility] GeeYee allows you to send messages to your doctor, view your test results, renew your prescriptions, schedule appointments, and more. To sign up, go to [employer] and click on the Sign Up Now link in the New User? box. Enter your GeeYee Activation Code exactly as it appears below along with the last four digits of your Social Security Number and your Date of Birth () to complete the sign-up process. If you do not sign up before the expiration date, you must request a new  code.     S[employer] Activation Code: Activation code not generated  Current TalkBint Status: Active     If you have questions, you can email [email or web address] or call [phone] to talk to our [employer] staff. Remember, MyChart is NOT to be used for urgent needs. For medical emergencies, dial 911.     IF YOU SMOKE OR USE TOBACCO PLEASE READ THE FOLLOWING:  Why is smoking bad for me?  Smoking increases the risk of heart disease, lung disease, vascular disease, stroke, and cancer. If you smoke, STOP!        IF YOU SMOKE OR USE TOBACCO PLEASE READ THE FOLLOWING:  Why is smoking bad for me?  Smoking increases the risk of heart disease, lung disease, vascular disease, stroke, and cancer. If you smoke, STOP!     For more information:  Quit Now [place]  1-800-QUIT-NOW  [email or web address]-[email or web address]

## 2022-07-12 ENCOUNTER — OFFICE VISIT (OUTPATIENT)
Dept: OTOLARYNGOLOGY | Facility: CLINIC | Age: 72
End: 2022-07-12

## 2022-07-12 VITALS
RESPIRATION RATE: 16 BRPM | HEART RATE: 123 BPM | TEMPERATURE: 97.3 F | DIASTOLIC BLOOD PRESSURE: 71 MMHG | HEIGHT: 75 IN | BODY MASS INDEX: 35.81 KG/M2 | SYSTOLIC BLOOD PRESSURE: 133 MMHG | WEIGHT: 288 LBS

## 2022-07-12 DIAGNOSIS — R59.0 LYMPHADENOPATHY OF HEAD AND NECK REGION: Primary | ICD-10-CM

## 2022-07-12 PROCEDURE — 99213 OFFICE O/P EST LOW 20 MIN: CPT | Performed by: NURSE PRACTITIONER

## 2022-07-12 NOTE — PATIENT INSTRUCTIONS
Patient to call if the lymph node is enlarging, if any difficulty swallowing, voice change, or if any other problems/concerns occur.    CONTACT INFORMATION:  The main office phone number is [phone]. For emergencies after hours and on weekends, this number will convert over to our answering service and the on call provider will answer. Please try to keep non emergent phone calls/ questions to office hours 9am-5pm Monday through Friday.      [employer]  As an alternative, you can sign up and use the Epic MyChart system for more direct and quicker access for non emergent questions/ problems.  [facility] allows you to send messages to your doctor, view your test results, renew your prescriptions, schedule appointments, and more. To sign up, go to [employer] and click on the Sign Up Now link in the New User? box. Enter your [employer] Activation Code exactly as it appears below along with the last four digits of your Social Security Number and your Date of Birth () to complete the sign-up process. If you do not sign up before the expiration date, you must request a new code.     [employer] Activation Code: Activation code not generated  Current [employer] Status: Active     If you have questions, you can email [email or web address] or call [phone] to talk to our [employer] staff. Remember, [employer] is NOT to be used for urgent needs. For medical emergencies, dial 911.     IF YOU SMOKE OR USE TOBACCO PLEASE READ THE FOLLOWING:  Why is smoking bad for me?  Smoking increases the risk of heart disease, lung disease, vascular disease, stroke, and cancer. If you smoke, STOP!        IF YOU SMOKE OR USE TOBACCO PLEASE READ THE FOLLOWING:  Why is smoking bad for me?  Smoking increases the risk of heart disease, lung disease, vascular disease, stroke, and cancer. If you smoke, STOP!     For more information:  Quit Now [place]  QUITNOW  [email or web address]

## 2022-09-02 LAB — PSA SERPL-MCNC: 0.61 NG/ML (ref 0–4)

## 2022-09-06 NOTE — PROGRESS NOTES
Chief Complaint  Follow-up BPH/prostate cancer-pT1b    Subjective          Humberto Auguste Jr. presents to [facility] returns today being just over 7 months from transurethral section of prostate which was noted to have a small focus of Doran grade 3+4 = 7 adenocarcinoma in 5% of the specimen.    He does have some difficulty with erections.  He takes sildenafil 40 mg on a as needed basis.  This works well for him.        Current Outpatient Medications:   •  Cod Liver Oil 1000 MG capsule, Take 2 capsules by mouth Daily., Disp: , Rfl:   •  furosemide (LASIX) 40 MG tablet, Take 40 mg by mouth Daily As Needed., Disp: , Rfl:   •  lisinopril (PRINIVIL,ZESTRIL) 10 MG tablet, Take 10 mg by mouth Daily., Disp: , Rfl:   •  metFORMIN (GLUCOPHAGE) 500 MG tablet, Take 500 mg by mouth 2 (Two) Times a Day With Meals., Disp: , Rfl:   •  sildenafil (REVATIO) 20 MG tablet, TAKE 1 TO 4 TABLETS BY MOUTH 1 TO 2 HOURS BEFORE SEXUAL ACTIVITY AS NEEDED, Disp: 15 tablet, Rfl: 11  •  simvastatin (ZOCOR) 20 MG tablet, Take 40 mg by mouth Every Night., Disp: , Rfl:   •  tamsulosin (FLOMAX) 0.4 MG capsule 24 hr capsule, Take 1 capsule by mouth Daily. (Patient taking differently: Take 2 capsules by mouth Daily.), Disp: 90 capsule, Rfl: 3  Past Medical History:   Diagnosis Date   • Arthritis    • Diabetes mellitus (HCC)    • Elevated cholesterol    • Enlarged prostate    • Hypertension    • Rotator cuff disorder, right    • Sleep apnea      Past Surgical History:   Procedure Laterality Date   • COLONOSCOPY  06/02/2016    polyp removed from 95 cm.a long [id number] colon not allowing mitchell safr colonoscopy   • COLONOSCOPY N/A 6/8/2021    Procedure: COLONOSCOPY WITH ANESTHESIA;  Surgeon: Rick Dixon DO;  Location: [facility] END[facility]COPY;  Service: Gastroenterology;  Laterality: N/A;  pre op: hx polyps  post op:normal  PCP: Feliciano Kinsey MD   • CYSTOSCOPY TRANSURETHRAL RESECTION OF PROSTATE  "N/A 1/28/2022    Procedure: TRANSURETHRAL RESECTION OF PROSTATE;  Surgeon: Camron Bowden MD;  Location:  [facility];  Service: Urology;  Laterality: N/A;   • HERNIA REPAIR     • SHOULDER ARTHROSCOPY W/ ROTATOR CUFF REPAIR Right 3/17/2020    Procedure: RIGHT SHOULDER  ROTATOR CUFF REPAIR, SUBACROMIAL DECOMPRESSION;  Surgeon: Alphonso Lundberg MD;  Location:  [facility] OR;  Service: Orthopedics;  Laterality: Right;   • VARICOSE VEIN SURGERY             Review  of systems  Constitutional: Negative for chills or fever.   Gastrointestinal: Negative for abdominal pain, anal bleeding or blood in stool.           Objective   PHYSICAL EXAM  Vital Signs:   Temp 97.7 °F (36.5 °C)   Ht 190.5 cm (75\")   Wt 127 kg (280 lb)   BMI 35.00 kg/m²     Constitutional: Patient is without distress or deformity.  Vital signs are reviewed as above.    Neuro: No confusion; No disorientation; Alert and oriented  Pulmonary: No respiratory distress.   Skin: No pallor or diaphoresis      DATA  Result Review :              Results for orders placed or performed in visit on 09/08/22   POC Urinalysis Dipstick, Multipro    Specimen: Urine   Result Value Ref Range    Color Yellow Yellow, Straw, Dark Yellow, Amy    Clarity, UA Clear Clear    Glucose, UA Negative Negative mg/dL    Bilirubin Negative Negative    Ketones, UA Negative Negative    Specific Gravity  1.025 1.005 - 1.030    Blood, UA Negative Negative    pH, Urine 6.0 5.0 - 8.0    Protein, POC Trace (A) Negative mg/dL    Urobilinogen, UA Normal Normal, 0.2 E.U./dL    Nitrite, UA Positive (A) Negative    Leukocytes Small (1+) (A) Negative     Lab Results   Component Value Date    PSA 0.608 09/01/2022    PSA 1.73 07/20/2021    PSA 1.57 12/13/2019                      ASSESSMENT AND PLAN          Problem List Items Addressed This Visit        Genitourinary and Reproductive     Benign non-nodular prostatic hyperplasia with lower urinary tract symptoms    Overview     Added automatically " from request for surgery [id number]         Relevant Orders    POC Urinalysis Dipstick, Multipro (Completed)      Other Visit Diagnoses     Prostate cancer (HCC)    -  Primary    Relevant Orders    POC Urinalysis Dipstick, Multipro (Completed)    Erectile dysfunction, unspecified erectile dysfunction type          - His lower urinary tract symptoms have improved considerably on TURP  - His PSA has continued to decrease s/p TURP. Will continue watchful waiting approach on his prostate cancer.  I do think he will need an MRI before his next visit.  He will require an anesthetic before this.  -Continue 40 mg sildenafil as needed        FOLLOW UP     Return in about 6 months (around 3/8/2023).        (Please note that portions of this note were completed with a voice recognition program.)  Camron Bowden MD  09/08/22  15:22 CDT

## 2022-09-08 ENCOUNTER — OFFICE VISIT (OUTPATIENT)
Dept: UROLOGY | Facility: CLINIC | Age: 72
End: 2022-09-08

## 2022-09-08 VITALS — BODY MASS INDEX: 34.82 KG/M2 | HEIGHT: 75 IN | WEIGHT: 280 LBS | TEMPERATURE: 97.7 F

## 2022-09-08 DIAGNOSIS — C61 PROSTATE CANCER: Primary | ICD-10-CM

## 2022-09-08 DIAGNOSIS — N40.1 BENIGN NON-NODULAR PROSTATIC HYPERPLASIA WITH LOWER URINARY TRACT SYMPTOMS: ICD-10-CM

## 2022-09-08 DIAGNOSIS — N52.9 ERECTILE DYSFUNCTION, UNSPECIFIED ERECTILE DYSFUNCTION TYPE: ICD-10-CM

## 2022-09-08 LAB
BILIRUB BLD-MCNC: NEGATIVE MG/DL
CLARITY, POC: CLEAR
COLOR UR: YELLOW
GLUCOSE UR STRIP-MCNC: NEGATIVE MG/DL
KETONES UR QL: NEGATIVE
LEUKOCYTE EST, POC: ABNORMAL
NITRITE UR-MCNC: POSITIVE MG/ML
PH UR: 6 [PH] (ref 5–8)
PROT UR STRIP-MCNC: ABNORMAL MG/DL
RBC # UR STRIP: NEGATIVE /UL
SP GR UR: 1.02 (ref 1–1.03)
UROBILINOGEN UR QL: NORMAL

## 2022-09-08 PROCEDURE — 99213 OFFICE O/P EST LOW 20 MIN: CPT | Performed by: UROLOGY

## 2022-09-08 PROCEDURE — 81003 URINALYSIS AUTO W/O SCOPE: CPT | Performed by: UROLOGY

## 2022-10-10 NOTE — PROGRESS NOTES
YOB: 1950  Location: [facility]  Location Address: [address] [place] [address], [facility] [address], [address] [place] [address]  Location Phone: [phone]    Chief Complaint   Patient presents with   • Swollen Glands       History of Present Illness  Humberto Auguste Jr. is a 72 y.o. male.  Humberto Auguste Jr. is here for follow up of ENT complaints. The patient has had problems with lymphadenopathy     The symptoms are localized to the left side. The patient has had mild symptoms.   Patient feels as if lymph nodes have been relatively unchanged.   He denies difficulty swallowing, hoarseness, or globus sensation     Patient does not recall diagnosis of thyroid nodules in the past     CT Soft Tissue Neck With & Without Contrast (2022 12:25)    US Head Neck Soft Tissue (10/11/2022 15:10)       Past Medical History:   Diagnosis Date   • Arthritis    • Diabetes mellitus (HCC)    • Elevated cholesterol    • Enlarged prostate    • Hypertension    • Rotator cuff disorder, right    • Sleep apnea        Past Surgical History:   Procedure Laterality Date   • COLONOSCOPY  2016    polyp removed from 95 cm.a long [id number] colon not allowing mitchell safr colonoscopy   • COLONOSCOPY N/A 2021    Procedure: COLONOSCOPY WITH ANESTHESIA;  Surgeon: Rick Dixon DO;  Location: [facility] ENDOSCOPY;  Service: Gastroenterology;  Laterality: N/A;  pre op: hx polyps  post op:normal  PCP: Feliciano Kinsey MD   • CYSTOSCOPY TRANSURETHRAL RESECTION OF PROSTATE N/A 2022    Procedure: TRANSURETHRAL RESECTION OF PROSTATE;  Surgeon: Camron Bowden MD;  Location: [facility] OR;  Service: Urology;  Laterality: N/A;   • HERNIA REPAIR     • SHOULDER ARTHROSCOPY W/ ROTATOR CUFF REPAIR Right 3/17/2020    Procedure: RIGHT SHOULDER  ROTATOR CUFF REPAIR, SUBACROMIAL DECOMPRESSION;  Surgeon: Alphonso Lundberg MD;  Location: [facility] OR;  Service: Orthopedics;  Laterality: Right;   • VARICOSE VEIN SURGERY          Outpatient Medications Marked as Taking for the 10/11/22 encounter (Office Visit) with Benny Castellanos MD   Medication Sig Dispense Refill   • Cod Liver Oil 1000 MG capsule Take 2 capsules by mouth Daily.     • furosemide (LASIX) 40 MG tablet Take 40 mg by mouth Daily As Needed.     • lisinopril (PRINIVIL,ZESTRIL) 10 MG tablet Take 10 mg by mouth Daily.     • metFORMIN (GLUCOPHAGE) 500 MG tablet Take 500 mg by mouth 2 (Two) Times a Day With Meals.     • sildenafil (REVATIO) 20 MG tablet TAKE 1 TO 4 TABLETS BY MOUTH 1 TO 2 HOURS BEFORE SEXUAL ACTIVITY AS NEEDED 15 tablet 11   • simvastatin (ZOCOR) 20 MG tablet Take 40 mg by mouth Every Night.     • tamsulosin (FLOMAX) 0.4 MG capsule 24 hr capsule Take 1 capsule by mouth Daily. (Patient taking differently: Take 2 capsules by mouth Daily.) 90 capsule 3       Percocet [oxycodone-acetaminophen]    Family History   Problem Relation Age of Onset   • Diabetes Father         Type 2 diabetic; hes 91   • No Known Problems Mother    • Cancer Paternal Uncle         He  of leukemia; ; he was 49   • Diabetes Paternal Grandfather           of pneumonia; was 70; type 2 diabetes   • Cancer Maternal Grandfather          in  of prostrate cancer; he was 81   • Colon cancer Neg Hx    • Colon polyps Neg Hx    • Esophageal cancer Neg Hx        Social History     Socioeconomic History   • Marital status:    Tobacco Use   • Smoking status: Former     Packs/day: 1.00     Years: 40.00     Pack years: 40.00     Types: Cigarettes     Start date: 1967     Quit date: 12/15/2015     Years since quittin.8   • Smokeless tobacco: Never   • Tobacco comments:     Stopped 12/15/2015   Vaping Use   • Vaping Use: Never used   Substance and Sexual Activity   • Alcohol use: No   • Drug use: No   • Sexual activity: Defer       Review of Systems   Constitutional: Negative.    HENT: Negative for sore throat, trouble swallowing and voice change.        Vitals:     10/11/22 1507   BP: 144/77   Pulse: 70   Temp: 98.2 °F (36.8 °C)       Body mass index is 35.25 kg/m².    Objective     Physical Exam  Vitals reviewed.   Constitutional:       Appearance: Normal appearance. He is obese.   HENT:      Head: Normocephalic.      Right Ear: Hearing, tympanic membrane, ear canal and external ear normal.      Left Ear: Hearing, tympanic membrane, ear canal and external ear normal.      Nose: Nose normal.      Mouth/Throat:      Lips: Pink.      Mouth: Mucous membranes are moist.      Pharynx: Uvula midline.   Neck:      Comments: Palpable right thyroid nodule     Musculoskeletal:      Cervical back: Full passive range of motion without pain.   Neurological:      Mental Status: He is alert.         Assessment & Plan   Diagnoses and all orders for this visit:    1. Lymphadenopathy of head and neck region (Primary)  -     US Head Neck Soft Tissue; Future    2. Thyroid nodule  -     US Guided Thyroid Biopsy; Future    Other orders  -     Fine Needle Aspiration; Standing      * Surgery not found *  Orders Placed This Encounter   Procedures   • US Head Neck Soft Tissue     Standing Status:   Future     Number of Occurrences:   1     Standing Expiration Date:   10/11/2023     Order Specific Question:   Reason for Exam:     Answer:   lymphadenopathy     Order Specific Question:   Release to patient     Answer:   Routine Release   • US Guided Thyroid Biopsy     Standing Status:   Future     Standing Expiration Date:   10/12/2023     Order Specific Question:   Reason for Exam:     Answer:   right thyroid nodules     Return in about 3 months (around 1/11/2023) for Recheck.     Risks vs benefits of fine needle aspiration discussed      Patient Instructions   The patient has a thyroid nodule. I explained the pathology of thyroid nodules including the risks of cancer. The options of surgery versus an observational course were discussed. Recommendation was made for a FINE NEEDLE ASPIRATION of the  nodule/mass

## 2022-10-11 ENCOUNTER — OFFICE VISIT (OUTPATIENT)
Dept: OTOLARYNGOLOGY | Facility: CLINIC | Age: 72
End: 2022-10-11

## 2022-10-11 VITALS
HEIGHT: 75 IN | WEIGHT: 282 LBS | BODY MASS INDEX: 35.06 KG/M2 | SYSTOLIC BLOOD PRESSURE: 144 MMHG | TEMPERATURE: 98.2 F | HEART RATE: 70 BPM | DIASTOLIC BLOOD PRESSURE: 77 MMHG

## 2022-10-11 DIAGNOSIS — R59.0 LYMPHADENOPATHY OF HEAD AND NECK REGION: Primary | ICD-10-CM

## 2022-10-11 DIAGNOSIS — E04.1 THYROID NODULE: ICD-10-CM

## 2022-10-11 PROCEDURE — 99213 OFFICE O/P EST LOW 20 MIN: CPT | Performed by: OTOLARYNGOLOGY

## 2022-10-11 NOTE — PATIENT INSTRUCTIONS
The patient has a thyroid nodule. I explained the pathology of thyroid nodules including the risks of cancer. The options of surgery versus an observational course were discussed. Recommendation was made for a FINE NEEDLE ASPIRATION of the nodule/mass

## 2022-10-12 ENCOUNTER — TELEPHONE (OUTPATIENT)
Dept: OTOLARYNGOLOGY | Facility: CLINIC | Age: 72
End: 2022-10-12

## 2022-10-12 NOTE — TELEPHONE ENCOUNTER
Patient notified    ----- Message from SULAIMAN Gaviria sent at 10/12/2022  8:57 AM CDT -----  Fine needle aspiration recommended of two right sided nodules

## 2022-11-01 ENCOUNTER — HOSPITAL ENCOUNTER (OUTPATIENT)
Dept: ULTRASOUND IMAGING | Facility: HOSPITAL | Age: 72
Discharge: HOME | End: 2022-11-01
Admitting: NURSE PRACTITIONER

## 2022-11-01 DIAGNOSIS — E04.1 THYROID NODULE: ICD-10-CM

## 2022-11-01 PROCEDURE — 88177 CYTP FNA EVAL EA ADDL: CPT | Performed by: NURSE PRACTITIONER

## 2022-11-01 PROCEDURE — 88172 CYTP DX EVAL FNA 1ST EA SITE: CPT | Performed by: NURSE PRACTITIONER

## 2022-11-01 PROCEDURE — 88112 CYTOPATH CELL ENHANCE TECH: CPT | Performed by: NURSE PRACTITIONER

## 2022-11-01 PROCEDURE — 76942 ECHO GUIDE FOR BIOPSY: CPT

## 2022-11-01 RX ORDER — LIDOCAINE HYDROCHLORIDE 10 MG/ML
10 INJECTION, SOLUTION INFILTRATION; PERINEURAL ONCE
Status: DISPENSED | OUTPATIENT
Start: 2022-11-01

## 2022-11-03 ENCOUNTER — TELEPHONE (OUTPATIENT)
Dept: OTOLARYNGOLOGY | Facility: CLINIC | Age: 72
End: 2022-11-03

## 2022-11-03 DIAGNOSIS — E04.1 THYROID NODULE: Primary | ICD-10-CM

## 2022-11-03 LAB
BEAKER LAB AP INTRAOPERATIVE CONSULTATION: NORMAL
CYTO UR: NORMAL
LAB AP CASE REPORT: NORMAL
LAB AP CLINICAL INFORMATION: NORMAL
Lab: NORMAL
PATH REPORT.FINAL DX SPEC: NORMAL
PATH REPORT.GROSS SPEC: NORMAL

## 2022-11-03 NOTE — TELEPHONE ENCOUNTER
----- Message from SULAIMAN Gaviria sent at 11/3/2022 10:37 AM CDT -----  [place] II benign follicular   Will continue to monitor with repeat ultrasounds      Ambulatory 24 hour Blood Pressure Monitor    On 03/06/19 you had a 24 hour ambulatory blood pressure monitor placed. The monitor is to be worn continuously for 24 hours. At scheduled intervals the monitor will take a blood pressure measurement and store it. Your physician will review all of the stored measurements when the monitor is returned. Your monitor completion date and time are 3/07/19 @ 3:25    Scheduled Reading Intervals  Every 20 minutes during the morning/daytime hours  Every 45 minutes during the evening/nighttime hours    Daily Use and Operation    DO  Â· Wear on non-dominant arm. Â· Avoid excess movement as the cuff pressure increases during measurements. Let the cuffed arm hang loosely, slightly away from the body with the middle of the cuff at heart level. Â· Avoid flexing the muscles or moving the hand and fingers of the cuffed arm. Â· During measurements you should be seated, standing or lying down. If seated you should have legs uncrossed, feet flat on the floor with back and arms supported. Â· Take care that the hose does not become kinked during sleep. Â· Keep monitor dry and do not drop it. Â· If monitor or cuff cause extreme pain or pain not normally associated with blood pressure measurement, call your physician. Â· You may stop a measurement by pressing the Start/Stop button. DO NOT  Â· Bathe or swim while wearing monitor. Â· Remove monitor before completion of 24 hours, unless instructed by your physician. Â· Talk during blood pressure measurements. Â· Between blood pressure readings the cuff should not be removed. Returning Blood Pressure Monitor  Â· At completion of 24 hour monitoring period remove cuff from arm and batteries from blood pressure monitor. Return monitor to clinic for processing. Â· Follow up with your physician for results.

## 2023-02-23 DIAGNOSIS — N52.9 ERECTILE DYSFUNCTION, UNSPECIFIED ERECTILE DYSFUNCTION TYPE: ICD-10-CM

## 2023-02-23 RX ORDER — SILDENAFIL CITRATE 20 MG/1
TABLET ORAL
Qty: 15 TABLET | Refills: 11 | Status: SHIPPED | OUTPATIENT
Start: 2023-02-23 | End: 2023-03-09 | Stop reason: DRUGHIGH

## 2023-03-02 ENCOUNTER — LAB (OUTPATIENT)
Dept: UROLOGY | Facility: CLINIC | Age: 73
End: 2023-03-02
Payer: MEDICARE

## 2023-03-02 DIAGNOSIS — C61 PROSTATE CANCER: Primary | ICD-10-CM

## 2023-03-02 DIAGNOSIS — C61 PROSTATE CANCER: ICD-10-CM

## 2023-03-03 LAB — PSA SERPL-MCNC: 0.65 NG/ML (ref 0–4)

## 2023-03-08 NOTE — PROGRESS NOTES
Chief Complaint  BPH and prostate cancer    Subjective          Hmuberto Auguste . presents to [facility] UROLOGY REJI Rand has 3chronic urologic issues that we have been managing as follows:  -BPH with lower urinary tract symptoms: I been following Giorgi for quite some time for BPH with lower urinary tract symptoms.  In January 2022 he underwent a transurethral section of prostate.  This did improve his urinary symptoms.  No infections or hematuria since seen him last.    -Prostate cancer: At the time of TURP we found a small focus of Martha grade 3+4 = 7 adenocarcinoma in 5% of the specimen.  Patient denies any possible systemic symptoms of prostate cancer such as weight loss, lower extremity edema, or skeletal pain that could be worrisome for systemic disease.    -Erectile dysfunction: Patient has difficulty with erections.  He has been using up to 80 mg sildenafil.  Uses the 20 mg tablets.  He is asking about other dosing options.  He reports no adverse effects.  Not completely satisfied with erection.         Current Outpatient Medications:   •  Cod Liver Oil 1000 MG capsule, Take 2 capsules by mouth Daily., Disp: , Rfl:   •  furosemide (LASIX) 40 MG tablet, Take 1 tablet by mouth Daily As Needed., Disp: , Rfl:   •  lisinopril (PRINIVIL,ZESTRIL) 10 MG tablet, Take 1 tablet by mouth Daily., Disp: , Rfl:   •  metFORMIN (GLUCOPHAGE) 500 MG tablet, Take 1 tablet by mouth 2 (Two) Times a Day With Meals., Disp: , Rfl:   •  simvastatin (ZOCOR) 20 MG tablet, Take 2 tablets by mouth Every Night., Disp: , Rfl:   •  tamsulosin (FLOMAX) 0.4 MG capsule 24 hr capsule, Take 1 capsule by mouth Daily. (Patient taking differently: Take 2 capsules by mouth Daily.), Disp: 90 capsule, Rfl: 3  •  sildenafil (VIAGRA) 100 MG tablet, Take 1 tablet by mouth Take As Directed. 1-4 hours before sexual activity, Disp: 30 tablet, Rfl: 5    Current Facility-Administered Medications:   •  lidocaine (XYLOCAINE) 1 %  "injection 10 mL, 10 mL, Infiltration, Once, Michael Macias MD  •  sodium bicarbonate injection 1 mEq, 2 mL, Injection, Once, Michael Macias MD  Past Medical History:   Diagnosis Date   • Arthritis    • Benign prostatic hyperplasia 2/1/12   • Diabetes mellitus (HCC)    • Elevated cholesterol    • Enlarged prostate    • Erectile dysfunction 2/1/16   • Hypertension    • Rotator cuff disorder, right    • Sleep apnea      Past Surgical History:   Procedure Laterality Date   • COLONOSCOPY  06/02/2016    polyp removed from 95 cm.a long [id number] colon not allowing mitchell safr colonoscopy   • COLONOSCOPY N/A 06/08/2021    Procedure: COLONOSCOPY WITH ANESTHESIA;  Surgeon: Rick Dixon DO;  Location:  [facility] ENDOSCOPY;  Service: Gastroenterology;  Laterality: N/A;  pre op: hx polyps  post op:normal  PCP: Feliciano Kinsey MD   • CYSTOSCOPY TRANSURETHRAL RESECTION OF PROSTATE N/A 01/28/2022    Procedure: TRANSURETHRAL RESECTION OF PROSTATE;  Surgeon: Camron Bowden MD;  Location:  [facility] OR;  Service: Urology;  Laterality: N/A;   • HERNIA REPAIR     • PROSTATE SURGERY  2/1/22   • SHOULDER ARTHROSCOPY W/ ROTATOR CUFF REPAIR Right 03/17/2020    Procedure: RIGHT SHOULDER  ROTATOR CUFF REPAIR, SUBACROMIAL DECOMPRESSION;  Surgeon: Alphonso Lundberg MD;  Location:  [facility] OR;  Service: Orthopedics;  Laterality: Right;   • VARICOCELE EXCISION  2/15/82   • VARICOSE VEIN SURGERY             Review of Systems      Objective   PHYSICAL EXAM  Vital Signs:   Temp 97.4 °F (36.3 °C)   Ht 190.5 cm (75\")   Wt 130 kg (286 lb 6.4 oz)   BMI 35.80 kg/m²     Physical Exam  Constitutional: Patient is without distress or deformity.  Vital signs are reviewed as above.    Neuro: No confusion; No disorientation; Alert and oriented  Pulmonary: No respiratory distress.   Skin: No pallor or diaphoresis        DATA  Result Review :              Results for orders placed or performed in visit on 03/09/23   POC Urinalysis Dipstick, " Multipro    Specimen: Urine   Result Value Ref Range    Color Yellow Yellow, Straw, Dark Yellow, Amy    Clarity, UA Clear Clear    Glucose, UA Negative Negative mg/dL    Bilirubin Negative Negative    Ketones, UA Negative Negative    Specific Gravity  1.020 1.005 - 1.030    Blood, UA Trace (A) Negative    pH, Urine 5.5 5.0 - 8.0    Protein, POC Negative Negative mg/dL    Urobilinogen, UA Normal Normal, 0.2 E.U./dL    Nitrite, UA Positive (A) Negative    Leukocytes Trace (A) Negative       Lab Results   Component Value Date    PSA 0.646 03/02/2023    PSA 0.47 01/05/2023    PSA 0.608 09/01/2022            ASSESSMENT AND PLAN          Problem List Items Addressed This Visit        Genitourinary and Reproductive     Benign non-nodular prostatic hyperplasia with lower urinary tract symptoms - Primary    Overview     Added automatically from request for surgery [id number]        Other Visit Diagnoses     Prostate cancer (HCC)        Relevant Orders    POC Urinalysis Dipstick, Multipro (Completed)    Erectile dysfunction, unspecified erectile dysfunction type        Relevant Medications    sildenafil (VIAGRA) 100 MG tablet      -He is stable post TURP from a voiding standpoint.  Lower urinary tract symptoms are still moderate.  Most bothered by nocturia up to 2-3 times nightly. Nocturia is explained to the patient is a manifestation of one the following or a combination of voiding dysfunction, other medical conditions, or a sleep disorder.  Nocturia as a completely isolated symptommore often represents a non-urologic problem or medical condition.  It is explained that as patient's age, they often have a reverse Circadian rhythm voiding pattern in which up to two thirds of the urine in a 24-hour period can be excreted at night.  We also went over sleep disorders of the patient which may affect them.  Volume-related disorders the cause mobilization of peripheral edema are also possible causes of nocturia including the  medications used to treat them.  Therefore, treatment must be directed at the cause of the symptom.  I explained we will do our best to evaluate any urologic cause of symptoms, but oftentimes we find no abnormality in voiding dysfunction to correct.  Evaluation options are explained to the patient.      -PSA remains very low.  We will continue surveillance.  When he returns in a year we will be able to use the PSA obtained by Dr. Kinsey at his routine follow-up    I would recommend he go up to 100 mg of sildenafil.  I will send in a prescription for this.  We talked about the therapeutic window and how taking it between 1 to 4 hours seems best          FOLLOW UP     Return in about 1 year (around 3/9/2024).        (Please note that portions of this note were completed with a voice recognition program.)  Camron Bowden MD  03/09/23  12:58 CST

## 2023-03-09 ENCOUNTER — OFFICE VISIT (OUTPATIENT)
Dept: UROLOGY | Facility: CLINIC | Age: 73
End: 2023-03-09
Payer: MEDICARE

## 2023-03-09 VITALS — BODY MASS INDEX: 35.61 KG/M2 | HEIGHT: 75 IN | WEIGHT: 286.4 LBS | TEMPERATURE: 97.4 F

## 2023-03-09 DIAGNOSIS — C61 PROSTATE CANCER: ICD-10-CM

## 2023-03-09 DIAGNOSIS — N40.1 BENIGN NON-NODULAR PROSTATIC HYPERPLASIA WITH LOWER URINARY TRACT SYMPTOMS: Primary | ICD-10-CM

## 2023-03-09 DIAGNOSIS — N52.9 ERECTILE DYSFUNCTION, UNSPECIFIED ERECTILE DYSFUNCTION TYPE: ICD-10-CM

## 2023-03-09 LAB
BILIRUB BLD-MCNC: NEGATIVE MG/DL
CLARITY, POC: CLEAR
COLOR UR: YELLOW
GLUCOSE UR STRIP-MCNC: NEGATIVE MG/DL
KETONES UR QL: NEGATIVE
LEUKOCYTE EST, POC: ABNORMAL
NITRITE UR-MCNC: POSITIVE MG/ML
PH UR: 5.5 [PH] (ref 5–8)
PROT UR STRIP-MCNC: NEGATIVE MG/DL
RBC # UR STRIP: ABNORMAL /UL
SP GR UR: 1.02 (ref 1–1.03)
UROBILINOGEN UR QL: NORMAL

## 2023-03-09 PROCEDURE — 99214 OFFICE O/P EST MOD 30 MIN: CPT | Performed by: UROLOGY

## 2023-03-09 PROCEDURE — 81003 URINALYSIS AUTO W/O SCOPE: CPT | Performed by: UROLOGY

## 2023-03-09 RX ORDER — SILDENAFIL 100 MG/1
100 TABLET, FILM COATED ORAL TAKE AS DIRECTED
Qty: 30 TABLET | Refills: 5 | Status: SHIPPED | OUTPATIENT
Start: 2023-03-09

## 2023-05-01 ENCOUNTER — OFFICE VISIT (OUTPATIENT)
Dept: OTOLARYNGOLOGY | Facility: CLINIC | Age: 73
End: 2023-05-01
Payer: MEDICARE

## 2023-05-01 VITALS
HEIGHT: 75 IN | TEMPERATURE: 98 F | WEIGHT: 290 LBS | HEART RATE: 102 BPM | DIASTOLIC BLOOD PRESSURE: 62 MMHG | BODY MASS INDEX: 36.06 KG/M2 | SYSTOLIC BLOOD PRESSURE: 108 MMHG

## 2023-05-01 DIAGNOSIS — R59.0 LYMPHADENOPATHY OF HEAD AND NECK REGION: ICD-10-CM

## 2023-05-01 DIAGNOSIS — E04.1 THYROID NODULE: Primary | ICD-10-CM

## 2023-05-01 PROCEDURE — 3074F SYST BP LT 130 MM HG: CPT | Performed by: NURSE PRACTITIONER

## 2023-05-01 PROCEDURE — 99213 OFFICE O/P EST LOW 20 MIN: CPT | Performed by: NURSE PRACTITIONER

## 2023-05-01 PROCEDURE — 3078F DIAST BP <80 MM HG: CPT | Performed by: NURSE PRACTITIONER

## 2023-05-01 NOTE — PROGRESS NOTES
YOB: 1950  Location: [facility]  Location Address: [address] [place] [address], [facility], [address] [place] [address]  Location Phone: [phone]    Chief Complaint   Patient presents with   • Swollen Glands       History of Present Illness  Humberto Auguste Jr. is a 72 y.o. male.  Humberto Auguste Jr. is here for follow up of ENT complaints. The patient has had problems with thyroid nodules   Nodules were diagnosed during episode of lymphadenopathy.   Patient denies sore throat, hoarseness, of difficulty swallowing.   Largest nodules underwent fine needle aspiration in November with benign findings     Fine Needle Aspiration (2022 09:27)    TSH (2023 09:28 EST)  US Thyroid (2023 09:03)       Past Medical History:   Diagnosis Date   • Arthritis    • Benign prostatic hyperplasia 12   • Diabetes mellitus    • Elevated cholesterol    • Enlarged prostate    • Erectile dysfunction 17   • Hypertension    • Rotator cuff disorder, right    • Sleep apnea        Past Surgical History:   Procedure Laterality Date   • COLONOSCOPY  2016    polyp removed from 95 cm.a long [id number] colon not allowing mitchell safr colonoscopy   • COLONOSCOPY N/A 2021    Procedure: COLONOSCOPY WITH ANESTHESIA;  Surgeon: Rick Dixon DO;  Location: [facility] ENDOSCOPY;  Service: Gastroenterology;  Laterality: N/A;  pre op: hx polyps  post op:normal  PCP: Feliciano Kinsey MD   • CYSTOSCOPY TRANSURETHRAL RESECTION OF PROSTATE N/A 2022    Procedure: TRANSURETHRAL RESECTION OF PROSTATE;  Surgeon: Camron Bowden MD;  Location: [facility] OR;  Service: Urology;  Laterality: N/A;   • HERNIA REPAIR     • PROSTATE SURGERY  22   • SHOULDER ARTHROSCOPY W/ ROTATOR CUFF REPAIR Right 2020    Procedure: RIGHT SHOULDER  ROTATOR CUFF REPAIR, SUBACROMIAL DECOMPRESSION;  Surgeon: Alphonso Lundberg MD;  Location: [facility] OR;  Service: Orthopedics;  Laterality: Right;   • VARICOCELE EXCISION   2/15/82   • VARICOSE VEIN SURGERY         Outpatient Medications Marked as Taking for the 23 encounter (Office Visit) with Laura Omalley APRN   Medication Sig Dispense Refill   • Cod Liver Oil 1000 MG capsule Take 2 capsules by mouth Daily.     • furosemide (LASIX) 40 MG tablet Take 1 tablet by mouth Daily As Needed.     • lisinopril (PRINIVIL,ZESTRIL) 10 MG tablet Take 1 tablet by mouth Daily.     • metFORMIN (GLUCOPHAGE) 500 MG tablet Take 1 tablet by mouth 2 (Two) Times a Day With Meals.     • sildenafil (VIAGRA) 100 MG tablet Take 1 tablet by mouth Take As Directed. 1-4 hours before sexual activity 30 tablet 5   • simvastatin (ZOCOR) 20 MG tablet Take 2 tablets by mouth Every Night.     • tamsulosin (FLOMAX) 0.4 MG capsule 24 hr capsule Take 1 capsule by mouth Daily. (Patient taking differently: Take 2 capsules by mouth Daily.) 90 capsule 3     Current Facility-Administered Medications for the 23 encounter (Office Visit) with Laura Omalley APRN   Medication Dose Route Frequency Provider Last Rate Last Admin   • lidocaine (XYLOCAINE) 1 % injection 10 mL  10 mL Infiltration Once Michael Macias MD       • sodium bicarbonate injection 1 mEq  2 mL Injection Once Michael Macias MD           Percocet [oxycodone-acetaminophen]    Family History   Problem Relation Age of Onset   • No Known Problems Mother    • Diabetes Father         Type 2 diabetic; hes 91   • Cancer Maternal Grandfather          in  of prostrate cancer; he was 81   • Cancer Paternal Grandmother          of lung cancer ; he was 67   • Diabetes Paternal Grandfather           of pneumonia; was 70; type 2 diabetes   • Cancer Paternal Uncle         He  of leukemia; ; he was 49   • Colon cancer Neg Hx    • Colon polyps Neg Hx    • Esophageal cancer Neg Hx        Social History     Socioeconomic History   • Marital status:    Tobacco Use   • Smoking status: Former     Packs/day: 1.00     Years: 40.00      Pack years: 40.00     Types: Cigarettes     Start date: 1967     Quit date: 12/15/2015     Years since quittin.3   • Smokeless tobacco: Never   • Tobacco comments:     Never again   Vaping Use   • Vaping Use: Never used   Substance and Sexual Activity   • Alcohol use: No   • Drug use: Never   • Sexual activity: Yes     Comment: My wife; I’m        Review of Systems   Constitutional: Negative.    HENT: Negative for sore throat, trouble swallowing and voice change.        Vitals:    23 0908   BP: 108/62   Pulse: 102   Temp: 98 °F (36.7 °C)       Body mass index is 36.25 kg/m².    Objective     Physical Exam  Vitals reviewed.   Constitutional:       Appearance: He is obese.   HENT:      Head: Normocephalic.      Right Ear: Tympanic membrane, ear canal and external ear normal.      Left Ear: Tympanic membrane, ear canal and external ear normal.      Nose: Nose normal.      Mouth/Throat:      Lips: Pink.      Mouth: Mucous membranes are moist.      Pharynx: Uvula midline.   Neck:      Comments: Palpable right thyroid nodules     Musculoskeletal:      Cervical back: Full passive range of motion without pain.   Neurological:      Mental Status: He is alert.         Assessment & Plan   Diagnoses and all orders for this visit:    1. Thyroid nodule (Primary)  -     US Thyroid; Future    2. Lymphadenopathy of head and neck region      * Surgery not found *  Orders Placed This Encounter   Procedures   • US Thyroid     Standing Status:   Future     Standing Expiration Date:   2024     Order Specific Question:   Reason for Exam:     Answer:   Thyroid disease     Return in about 1 year (around 2024) for Recheck.     F/u with repeat ultrasound in one year   Call for new/worsening problems        Patient Instructions   The patient has a thyroid nodule, which is relatively small, and studies do not suggest a malignancy. I have recommended observation with follow-up with me for repeat ultrasound. I  explained the pathology of thyroid nodules including the risks of cancer. The options of surgery were discussed, but the patient wants to pursue an observational course for now, which is reasonable. The patient wishes to continue to defer surgery at this time.

## 2023-05-25 ENCOUNTER — HOSPITAL ENCOUNTER (EMERGENCY)
Facility: HOSPITAL | Age: 73
Discharge: HOME | End: 2023-05-25
Attending: STUDENT IN AN ORGANIZED HEALTH CARE EDUCATION/TRAINING PROGRAM
Payer: MEDICARE

## 2023-05-25 VITALS
OXYGEN SATURATION: 96 % | SYSTOLIC BLOOD PRESSURE: 122 MMHG | WEIGHT: 284 LBS | RESPIRATION RATE: 18 BRPM | HEIGHT: 75 IN | BODY MASS INDEX: 35.31 KG/M2 | HEART RATE: 82 BPM | DIASTOLIC BLOOD PRESSURE: 95 MMHG | TEMPERATURE: 97.7 F

## 2023-05-25 DIAGNOSIS — R33.9 URINARY RETENTION: Primary | ICD-10-CM

## 2023-05-25 LAB
ANION GAP SERPL CALCULATED.3IONS-SCNC: 12 MMOL/L (ref 5–15)
BACTERIA UR QL AUTO: ABNORMAL /HPF
BASOPHILS # BLD AUTO: 0.01 10*3/MM3 (ref 0–0.2)
BASOPHILS NFR BLD AUTO: 0.1 % (ref 0–1.5)
BILIRUB UR QL STRIP: NEGATIVE
BUN SERPL-MCNC: 26 MG/DL (ref 8–23)
BUN/CREAT SERPL: 21 (ref 7–25)
CALCIUM SPEC-SCNC: 8.9 MG/DL (ref 8.6–10.5)
CHLORIDE SERPL-SCNC: 104 MMOL/L (ref 98–107)
CLARITY UR: CLEAR
CO2 SERPL-SCNC: 24 MMOL/L (ref 22–29)
COLOR UR: ABNORMAL
CREAT SERPL-MCNC: 1.24 MG/DL (ref 0.76–1.27)
DEPRECATED RDW RBC AUTO: 42.8 FL (ref 37–54)
EGFRCR SERPLBLD CKD-EPI 2021: 62.2 ML/MIN/1.73
EOSINOPHIL # BLD AUTO: 0.01 10*3/MM3 (ref 0–0.4)
EOSINOPHIL NFR BLD AUTO: 0.1 % (ref 0.3–6.2)
ERYTHROCYTE [DISTWIDTH] IN BLOOD BY AUTOMATED COUNT: 12.3 % (ref 12.3–15.4)
GLUCOSE SERPL-MCNC: 171 MG/DL (ref 65–99)
GLUCOSE UR STRIP-MCNC: NEGATIVE MG/DL
HCT VFR BLD AUTO: 41 % (ref 37.5–51)
HGB BLD-MCNC: 12.8 G/DL (ref 13–17.7)
HGB UR QL STRIP.AUTO: ABNORMAL
HYALINE CASTS UR QL AUTO: ABNORMAL /LPF
IMM GRANULOCYTES # BLD AUTO: 0.01 10*3/MM3 (ref 0–0.05)
IMM GRANULOCYTES NFR BLD AUTO: 0.1 % (ref 0–0.5)
KETONES UR QL STRIP: NEGATIVE
LEUKOCYTE ESTERASE UR QL STRIP.AUTO: ABNORMAL
LYMPHOCYTES # BLD AUTO: 0.66 10*3/MM3 (ref 0.7–3.1)
LYMPHOCYTES NFR BLD AUTO: 8.1 % (ref 19.6–45.3)
MCH RBC QN AUTO: 29.4 PG (ref 26.6–33)
MCHC RBC AUTO-ENTMCNC: 31.2 G/DL (ref 31.5–35.7)
MCV RBC AUTO: 94.3 FL (ref 79–97)
MONOCYTES # BLD AUTO: 0.42 10*3/MM3 (ref 0.1–0.9)
MONOCYTES NFR BLD AUTO: 5.1 % (ref 5–12)
NEUTROPHILS NFR BLD AUTO: 7.07 10*3/MM3 (ref 1.7–7)
NEUTROPHILS NFR BLD AUTO: 86.5 % (ref 42.7–76)
NITRITE UR QL STRIP: NEGATIVE
NRBC BLD AUTO-RTO: 0 /100 WBC (ref 0–0.2)
PH UR STRIP.AUTO: 5.5 [PH] (ref 5–8)
PLATELET # BLD AUTO: 172 10*3/MM3 (ref 140–450)
PMV BLD AUTO: 10.7 FL (ref 6–12)
POTASSIUM SERPL-SCNC: 4 MMOL/L (ref 3.5–5.2)
PROT UR QL STRIP: ABNORMAL
RBC # BLD AUTO: 4.35 10*6/MM3 (ref 4.14–5.8)
RBC # UR STRIP: ABNORMAL /HPF
REF LAB TEST METHOD: ABNORMAL
SODIUM SERPL-SCNC: 140 MMOL/L (ref 136–145)
SP GR UR STRIP: 1.01 (ref 1–1.03)
SQUAMOUS #/AREA URNS HPF: ABNORMAL /HPF
URATE CRY URNS QL MICRO: ABNORMAL /HPF
UROBILINOGEN UR QL STRIP: ABNORMAL
WBC # UR STRIP: ABNORMAL /HPF
WBC NRBC COR # BLD: 8.18 10*3/MM3 (ref 3.4–10.8)

## 2023-05-25 PROCEDURE — 36415 COLL VENOUS BLD VENIPUNCTURE: CPT

## 2023-05-25 PROCEDURE — 81001 URINALYSIS AUTO W/SCOPE: CPT | Performed by: STUDENT IN AN ORGANIZED HEALTH CARE EDUCATION/TRAINING PROGRAM

## 2023-05-25 PROCEDURE — 99283 EMERGENCY DEPT VISIT LOW MDM: CPT

## 2023-05-25 PROCEDURE — 51702 INSERT TEMP BLADDER CATH: CPT

## 2023-05-25 PROCEDURE — 51798 US URINE CAPACITY MEASURE: CPT

## 2023-05-25 PROCEDURE — 85025 COMPLETE CBC W/AUTO DIFF WBC: CPT | Performed by: STUDENT IN AN ORGANIZED HEALTH CARE EDUCATION/TRAINING PROGRAM

## 2023-05-25 PROCEDURE — 80048 BASIC METABOLIC PNL TOTAL CA: CPT | Performed by: STUDENT IN AN ORGANIZED HEALTH CARE EDUCATION/TRAINING PROGRAM

## 2023-05-25 NOTE — ED PROVIDER NOTES
Subjective   History of Present Illness  Patient presents due to difficulty urinating.  Started last night.  Has been unable to void all night.  Has a history of enlarged prostate with a history of TURP.  He denies abdominal pain.  No fevers.  No nausea or vomiting.  Defecating normally.  Did not have any dysuria preceding this.  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  No chest pain trouble breathing or other symptoms today.    Review of Systems   Constitutional:  Negative for chills and fever.   Respiratory:  Negative for cough and shortness of breath.    Cardiovascular:  Negative for chest pain and palpitations.   Gastrointestinal:  Negative for abdominal pain and vomiting.   Genitourinary:  Positive for difficulty urinating. Negative for dysuria.   Neurological:  Negative for syncope and light-headedness.     Past Medical History:   Diagnosis Date    Arthritis     Benign prostatic hyperplasia 2/1/12    Diabetes mellitus     Elevated cholesterol     Enlarged prostate     Erectile dysfunction 2/1/16    Hypertension     Rotator cuff disorder, right     Sleep apnea        Allergies   Allergen Reactions    Percocet [Oxycodone-Acetaminophen] Nausea Only       Past Surgical History:   Procedure Laterality Date    COLONOSCOPY  06/02/2016    polyp removed from 95 cm.a long [id number] colon not allowing mitchell safr colonoscopy    COLONOSCOPY N/A 06/08/2021    Procedure: COLONOSCOPY WITH ANESTHESIA;  Surgeon: Rick Dixon DO;  Location: [facility] ENDOSCOPY;  Service: Gastroenterology;  Laterality: N/A;  pre op: hx polyps  post op:normal  PCP: Feliciano Kinsey MD    CYSTOSCOPY TRANSURETHRAL RESECTION OF PROSTATE N/A 01/28/2022    Procedure: TRANSURETHRAL RESECTION OF PROSTATE;  Surgeon: Camron Bowden MD;  Location: [facility] OR;  Service: Urology;  Laterality: N/A;    HERNIA REPAIR      PROSTATE SURGERY  2/1/22    SHOULDER ARTHROSCOPY W/ ROTATOR CUFF REPAIR Right 03/17/2020    Procedure: RIGHT SHOULDER  ROTATOR  CUFF REPAIR, SUBACROMIAL DECOMPRESSION;  Surgeon: Alphonso Lundberg MD;  Location: [facility] OR;  Service: Orthopedics;  Laterality: Right;    VARICOCELE EXCISION  2/15/82    VARICOSE VEIN SURGERY         Family History   Problem Relation Age of Onset    No Known Problems Mother     Diabetes Father         Type 2 diabetic; hes 91    Cancer Maternal Grandfather          in  of prostrate cancer; he was 81    Cancer Paternal Grandmother          of lung cancer ; he was 67    Diabetes Paternal Grandfather           of pneumonia; was 70; type 2 diabetes    Cancer Paternal Uncle         He  of leukemia; ; he was 49    Colon cancer Neg Hx     Colon polyps Neg Hx     Esophageal cancer Neg Hx        Social History     Socioeconomic History    Marital status:    Tobacco Use    Smoking status: Former     Packs/day: 1.00     Years: 40.00     Pack years: 40.00     Types: Cigarettes     Start date: 1967     Quit date: 12/15/2015     Years since quittin.4    Smokeless tobacco: Never    Tobacco comments:     Never again   Vaping Use    Vaping Use: Never used   Substance and Sexual Activity    Alcohol use: No    Drug use: Never    Sexual activity: Yes     Comment: My wife; I’m            Objective   Physical Exam  Vitals reviewed.   Constitutional:       General: He is not in acute distress.  HENT:      Head: Normocephalic and atraumatic.   Eyes:      Extraocular Movements: Extraocular movements intact.      Conjunctiva/sclera: Conjunctivae normal.   Cardiovascular:      Pulses: Normal pulses.      Heart sounds: Normal heart sounds.   Pulmonary:      Effort: Pulmonary effort is normal. No respiratory distress.   Abdominal:      General: Abdomen is flat. There is no distension.      Tenderness: There is no abdominal tenderness.   Genitourinary:     Penis: Normal.       Testes: Normal.      Comments: Law in place draining blood tinged urine but clearing, urine in the tube is  yellow without signs of hemorrhage  Musculoskeletal:      Cervical back: Normal range of motion and neck supple.      Right lower leg: No edema.      Left lower leg: No edema.   Skin:     General: Skin is warm and dry.   Neurological:      General: No focal deficit present.      Mental Status: He is alert. Mental status is at baseline.   Psychiatric:         Behavior: Behavior normal.         Thought Content: Thought content normal.       Procedures           ED Course                                           Medical Decision Making  Problems Addressed:  Urinary retention: complicated acute illness or injury    Amount and/or Complexity of Data Reviewed  Labs: ordered.    Humberto Auguste Jr. is a 72 y.o. male with PMH above who presents to the Emergency Department with urinary retention.  Bladder scan shows a liter and patient unable to void.  Law catheter placed with good urine output.  There was some difficulty placing the Law, I assisted with advancement, but we ended up having to use a coudé which caused a little bit of blood-tinged urine however he did not have evidence of bladder hemorrhage.  No pain after the Law was placed excessively.  The nurse was concerned that she had bladder scan 800 additional cc of urine but my bedside ultrasound shows a clearly decompressed bladder with the Law bulb within the bladder.  No abdominal tenderness on exam.  No signs of intra-abdominal process.  Patient says the symptoms are totally resolved.  Labs showed no focal abnormality.  Discussed with him that he will need to follow-up with Dr. Bowden in the outpatient setting for further management of this Law catheter.  He agrees to call for an appointment.  Return precautions discussed.    Final diagnosis: urinary retention    All questions answered. Patient/family was understanding and in agreement with today's assessment and plan. The patient was monitored during their stay in the ED and dispositioned without  acute event.    Electronically signed by:  Jeremiah Lopez MD 5/25/2023 11:01 CDT      Note: Dragon medical dictation software was used in the creation of this note.        Final diagnoses:   Urinary retention       ED Disposition  ED Disposition       ED Disposition   Discharge    Condition   Stable    Comment   --               Feliciano Kinsey MD  [address] [place] [address] [address]  [place] [address]  [phone]               Medication List        Changed      tamsulosin 0.4 MG capsule 24 hr capsule  Commonly known as: FLOMAX  Take 1 capsule by mouth Daily.  What changed: how much to take                 Jeremiah Lopez MD  05/25/23 1100

## 2023-05-29 ENCOUNTER — HOSPITAL ENCOUNTER (INPATIENT)
Facility: HOSPITAL | Age: 73
LOS: 2 days | Discharge: HOME | DRG: 690 | End: 2023-05-31
Attending: FAMILY MEDICINE | Admitting: FAMILY MEDICINE
Payer: MEDICARE

## 2023-05-29 ENCOUNTER — ANESTHESIA (OUTPATIENT)
Dept: PERIOP | Facility: HOSPITAL | Age: 73
DRG: 690 | End: 2023-05-29
Payer: MEDICARE

## 2023-05-29 ENCOUNTER — ANESTHESIA EVENT (OUTPATIENT)
Dept: PERIOP | Facility: HOSPITAL | Age: 73
DRG: 690 | End: 2023-05-29
Payer: MEDICARE

## 2023-05-29 ENCOUNTER — APPOINTMENT (OUTPATIENT)
Dept: CT IMAGING | Facility: HOSPITAL | Age: 73
DRG: 690 | End: 2023-05-29
Payer: MEDICARE

## 2023-05-29 ENCOUNTER — APPOINTMENT (OUTPATIENT)
Dept: GENERAL RADIOLOGY | Facility: HOSPITAL | Age: 73
DRG: 690 | End: 2023-05-29
Payer: MEDICARE

## 2023-05-29 DIAGNOSIS — R31.9 URINARY TRACT INFECTION WITH HEMATURIA, SITE UNSPECIFIED: ICD-10-CM

## 2023-05-29 DIAGNOSIS — I48.91 ATRIAL FIBRILLATION WITH RVR: ICD-10-CM

## 2023-05-29 DIAGNOSIS — N32.0 BLADDER NECK CONTRACTURE: ICD-10-CM

## 2023-05-29 DIAGNOSIS — N39.0 URINARY TRACT INFECTION WITH HEMATURIA, SITE UNSPECIFIED: ICD-10-CM

## 2023-05-29 DIAGNOSIS — A41.9 SEPSIS, DUE TO UNSPECIFIED ORGANISM, UNSPECIFIED WHETHER ACUTE ORGAN DYSFUNCTION PRESENT: Primary | ICD-10-CM

## 2023-05-29 DIAGNOSIS — R31.0 GROSS HEMATURIA: ICD-10-CM

## 2023-05-29 LAB
ALBUMIN SERPL-MCNC: 4.1 G/DL (ref 3.5–5.2)
ALBUMIN/GLOB SERPL: 1.6 G/DL
ALP SERPL-CCNC: 90 U/L (ref 39–117)
ALT SERPL W P-5'-P-CCNC: 11 U/L (ref 1–41)
ANION GAP SERPL CALCULATED.3IONS-SCNC: 12 MMOL/L (ref 5–15)
AST SERPL-CCNC: 12 U/L (ref 1–40)
BACTERIA UR QL AUTO: ABNORMAL /HPF
BASOPHILS # BLD AUTO: 0.01 10*3/MM3 (ref 0–0.2)
BASOPHILS NFR BLD AUTO: 0.5 % (ref 0–1.5)
BILIRUB SERPL-MCNC: 0.8 MG/DL (ref 0–1.2)
BILIRUB UR QL STRIP: ABNORMAL
BUN SERPL-MCNC: 28 MG/DL (ref 8–23)
BUN/CREAT SERPL: 21.7 (ref 7–25)
CALCIUM SPEC-SCNC: 9 MG/DL (ref 8.6–10.5)
CHLORIDE SERPL-SCNC: 107 MMOL/L (ref 98–107)
CK SERPL-CCNC: 46 U/L (ref 20–200)
CLARITY UR: ABNORMAL
CO2 SERPL-SCNC: 24 MMOL/L (ref 22–29)
COLOR UR: ABNORMAL
CREAT SERPL-MCNC: 1.29 MG/DL (ref 0.76–1.27)
D DIMER PPP FEU-MCNC: 3.6 MCGFEU/ML (ref 0–0.71)
D-LACTATE SERPL-SCNC: 1.6 MMOL/L (ref 0.5–2)
D-LACTATE SERPL-SCNC: 2.1 MMOL/L (ref 0.5–2)
DEPRECATED RDW RBC AUTO: 43.6 FL (ref 37–54)
EGFRCR SERPLBLD CKD-EPI 2021: 59.3 ML/MIN/1.73
EOSINOPHIL # BLD AUTO: 0.02 10*3/MM3 (ref 0–0.4)
EOSINOPHIL NFR BLD AUTO: 1 % (ref 0.3–6.2)
ERYTHROCYTE [DISTWIDTH] IN BLOOD BY AUTOMATED COUNT: 12.4 % (ref 12.3–15.4)
GEN 5 2HR TROPONIN T REFLEX: 17 NG/L
GLOBULIN UR ELPH-MCNC: 2.6 GM/DL
GLUCOSE BLDC GLUCOMTR-MCNC: 138 MG/DL (ref 70–130)
GLUCOSE BLDC GLUCOMTR-MCNC: 174 MG/DL (ref 70–130)
GLUCOSE SERPL-MCNC: 164 MG/DL (ref 65–99)
GLUCOSE UR STRIP-MCNC: NEGATIVE MG/DL
HCT VFR BLD AUTO: 41.4 % (ref 37.5–51)
HGB BLD-MCNC: 12.6 G/DL (ref 13–17.7)
HGB UR QL STRIP.AUTO: ABNORMAL
HYALINE CASTS UR QL AUTO: ABNORMAL /LPF
IMM GRANULOCYTES # BLD AUTO: 0.01 10*3/MM3 (ref 0–0.05)
IMM GRANULOCYTES NFR BLD AUTO: 0.5 % (ref 0–0.5)
KETONES UR QL STRIP: NEGATIVE
LEUKOCYTE ESTERASE UR QL STRIP.AUTO: ABNORMAL
LYMPHOCYTES # BLD AUTO: 0.14 10*3/MM3 (ref 0.7–3.1)
LYMPHOCYTES NFR BLD AUTO: 7.1 % (ref 19.6–45.3)
MAGNESIUM SERPL-MCNC: 1.8 MG/DL (ref 1.6–2.4)
MCH RBC QN AUTO: 29.1 PG (ref 26.6–33)
MCHC RBC AUTO-ENTMCNC: 30.4 G/DL (ref 31.5–35.7)
MCV RBC AUTO: 95.6 FL (ref 79–97)
MONOCYTES # BLD AUTO: 0.01 10*3/MM3 (ref 0.1–0.9)
MONOCYTES NFR BLD AUTO: 0.5 % (ref 5–12)
NEUTROPHILS NFR BLD AUTO: 1.78 10*3/MM3 (ref 1.7–7)
NEUTROPHILS NFR BLD AUTO: 90.4 % (ref 42.7–76)
NITRITE UR QL STRIP: POSITIVE
NRBC BLD AUTO-RTO: 0 /100 WBC (ref 0–0.2)
PH UR STRIP.AUTO: 6 [PH] (ref 5–8)
PLATELET # BLD AUTO: 144 10*3/MM3 (ref 140–450)
PMV BLD AUTO: 10.3 FL (ref 6–12)
POTASSIUM SERPL-SCNC: 3.9 MMOL/L (ref 3.5–5.2)
PROCALCITONIN SERPL-MCNC: 0.69 NG/ML (ref 0–0.25)
PROT SERPL-MCNC: 6.7 G/DL (ref 6–8.5)
PROT UR QL STRIP: ABNORMAL
RBC # BLD AUTO: 4.33 10*6/MM3 (ref 4.14–5.8)
RBC # UR STRIP: ABNORMAL /HPF
REF LAB TEST METHOD: ABNORMAL
SODIUM SERPL-SCNC: 143 MMOL/L (ref 136–145)
SP GR UR STRIP: 1.01 (ref 1–1.03)
SQUAMOUS #/AREA URNS HPF: ABNORMAL /HPF
TROPONIN T DELTA: -9 NG/L
TROPONIN T SERPL HS-MCNC: 26 NG/L
UROBILINOGEN UR QL STRIP: ABNORMAL
WBC # UR STRIP: ABNORMAL /HPF
WBC NRBC COR # BLD: 1.97 10*3/MM3 (ref 3.4–10.8)

## 2023-05-29 PROCEDURE — 87040 BLOOD CULTURE FOR BACTERIA: CPT | Performed by: FAMILY MEDICINE

## 2023-05-29 PROCEDURE — 25510000001 IOPAMIDOL PER 1 ML: Performed by: FAMILY MEDICINE

## 2023-05-29 PROCEDURE — 93005 ELECTROCARDIOGRAM TRACING: CPT | Performed by: FAMILY MEDICINE

## 2023-05-29 PROCEDURE — 25010000002 HYDROMORPHONE PER 4 MG

## 2023-05-29 PROCEDURE — 25010000002 SUGAMMADEX 200 MG/2ML SOLUTION

## 2023-05-29 PROCEDURE — 87088 URINE BACTERIA CULTURE: CPT | Performed by: FAMILY MEDICINE

## 2023-05-29 PROCEDURE — C1726 CATH, BAL DIL, NON-VASCULAR: HCPCS | Performed by: UROLOGY

## 2023-05-29 PROCEDURE — 74177 CT ABD & PELVIS W/CONTRAST: CPT

## 2023-05-29 PROCEDURE — 99285 EMERGENCY DEPT VISIT HI MDM: CPT

## 2023-05-29 PROCEDURE — 85025 COMPLETE CBC W/AUTO DIFF WBC: CPT | Performed by: FAMILY MEDICINE

## 2023-05-29 PROCEDURE — 85379 FIBRIN DEGRADATION QUANT: CPT | Performed by: FAMILY MEDICINE

## 2023-05-29 PROCEDURE — 71275 CT ANGIOGRAPHY CHEST: CPT

## 2023-05-29 PROCEDURE — 0T7C8ZZ DILATION OF BLADDER NECK, VIA NATURAL OR ARTIFICIAL OPENING ENDOSCOPIC: ICD-10-PCS | Performed by: UROLOGY

## 2023-05-29 PROCEDURE — 82550 ASSAY OF CK (CPK): CPT | Performed by: FAMILY MEDICINE

## 2023-05-29 PROCEDURE — 80053 COMPREHEN METABOLIC PANEL: CPT | Performed by: FAMILY MEDICINE

## 2023-05-29 PROCEDURE — P9612 CATHETERIZE FOR URINE SPEC: HCPCS

## 2023-05-29 PROCEDURE — 99222 1ST HOSP IP/OBS MODERATE 55: CPT | Performed by: INTERNAL MEDICINE

## 2023-05-29 PROCEDURE — 83735 ASSAY OF MAGNESIUM: CPT | Performed by: FAMILY MEDICINE

## 2023-05-29 PROCEDURE — 25010000002 CEFTRIAXONE PER 250 MG: Performed by: FAMILY MEDICINE

## 2023-05-29 PROCEDURE — 0TCB8ZZ EXTIRPATION OF MATTER FROM BLADDER, VIA NATURAL OR ARTIFICIAL OPENING ENDOSCOPIC: ICD-10-PCS | Performed by: UROLOGY

## 2023-05-29 PROCEDURE — 25010000002 ONDANSETRON PER 1 MG

## 2023-05-29 PROCEDURE — 82948 REAGENT STRIP/BLOOD GLUCOSE: CPT

## 2023-05-29 PROCEDURE — 93010 ELECTROCARDIOGRAM REPORT: CPT | Performed by: INTERNAL MEDICINE

## 2023-05-29 PROCEDURE — 51703 INSERT BLADDER CATH COMPLEX: CPT

## 2023-05-29 PROCEDURE — 71045 X-RAY EXAM CHEST 1 VIEW: CPT

## 2023-05-29 PROCEDURE — 99222 1ST HOSP IP/OBS MODERATE 55: CPT | Performed by: UROLOGY

## 2023-05-29 PROCEDURE — 52500 TRURL RESECTION BLADDER NECK: CPT | Performed by: UROLOGY

## 2023-05-29 PROCEDURE — 25010000002 PROPOFOL 10 MG/ML EMULSION

## 2023-05-29 PROCEDURE — 87186 SC STD MICRODIL/AGAR DIL: CPT | Performed by: FAMILY MEDICINE

## 2023-05-29 PROCEDURE — 83605 ASSAY OF LACTIC ACID: CPT | Performed by: FAMILY MEDICINE

## 2023-05-29 PROCEDURE — 84484 ASSAY OF TROPONIN QUANT: CPT | Performed by: FAMILY MEDICINE

## 2023-05-29 PROCEDURE — 25010000002 FENTANYL CITRATE (PF) 100 MCG/2ML SOLUTION

## 2023-05-29 PROCEDURE — 81001 URINALYSIS AUTO W/SCOPE: CPT | Performed by: FAMILY MEDICINE

## 2023-05-29 PROCEDURE — 84145 PROCALCITONIN (PCT): CPT | Performed by: FAMILY MEDICINE

## 2023-05-29 PROCEDURE — 87086 URINE CULTURE/COLONY COUNT: CPT | Performed by: FAMILY MEDICINE

## 2023-05-29 PROCEDURE — 36415 COLL VENOUS BLD VENIPUNCTURE: CPT

## 2023-05-29 RX ORDER — PROPOFOL 10 MG/ML
VIAL (ML) INTRAVENOUS AS NEEDED
Status: DISCONTINUED | OUTPATIENT
Start: 2023-05-29 | End: 2023-05-29 | Stop reason: SURG

## 2023-05-29 RX ORDER — HYDROMORPHONE HYDROCHLORIDE 1 MG/ML
0.5 INJECTION, SOLUTION INTRAMUSCULAR; INTRAVENOUS; SUBCUTANEOUS
Status: DISCONTINUED | OUTPATIENT
Start: 2023-05-29 | End: 2023-05-29 | Stop reason: HOSPADM

## 2023-05-29 RX ORDER — DILTIAZEM HYDROCHLORIDE 5 MG/ML
20 INJECTION INTRAVENOUS ONCE
Status: COMPLETED | OUTPATIENT
Start: 2023-05-29 | End: 2023-05-29

## 2023-05-29 RX ORDER — SODIUM CHLORIDE, SODIUM LACTATE, POTASSIUM CHLORIDE, CALCIUM CHLORIDE 600; 310; 30; 20 MG/100ML; MG/100ML; MG/100ML; MG/100ML
INJECTION, SOLUTION INTRAVENOUS CONTINUOUS PRN
Status: DISCONTINUED | OUTPATIENT
Start: 2023-05-29 | End: 2023-05-29 | Stop reason: SURG

## 2023-05-29 RX ORDER — SODIUM CHLORIDE 0.9 % (FLUSH) 0.9 %
10 SYRINGE (ML) INJECTION AS NEEDED
Status: DISCONTINUED | OUTPATIENT
Start: 2023-05-29 | End: 2023-05-31 | Stop reason: HOSPADM

## 2023-05-29 RX ORDER — LISINOPRIL 10 MG/1
10 TABLET ORAL DAILY
Status: DISCONTINUED | OUTPATIENT
Start: 2023-05-29 | End: 2023-05-31 | Stop reason: HOSPADM

## 2023-05-29 RX ORDER — OXYCODONE AND ACETAMINOPHEN 10; 325 MG/1; MG/1
1 TABLET ORAL ONCE AS NEEDED
Status: DISCONTINUED | OUTPATIENT
Start: 2023-05-29 | End: 2023-05-29 | Stop reason: HOSPADM

## 2023-05-29 RX ORDER — FLUMAZENIL 0.1 MG/ML
0.2 INJECTION INTRAVENOUS AS NEEDED
Status: DISCONTINUED | OUTPATIENT
Start: 2023-05-29 | End: 2023-05-29 | Stop reason: HOSPADM

## 2023-05-29 RX ORDER — IBUPROFEN 600 MG/1
600 TABLET ORAL ONCE AS NEEDED
Status: DISCONTINUED | OUTPATIENT
Start: 2023-05-29 | End: 2023-05-29 | Stop reason: HOSPADM

## 2023-05-29 RX ORDER — BUPIVACAINE HCL/0.9 % NACL/PF 0.125 %
PLASTIC BAG, INJECTION (ML) EPIDURAL AS NEEDED
Status: DISCONTINUED | OUTPATIENT
Start: 2023-05-29 | End: 2023-05-29 | Stop reason: SURG

## 2023-05-29 RX ORDER — HYDROCODONE BITARTRATE AND ACETAMINOPHEN 5; 325 MG/1; MG/1
1 TABLET ORAL EVERY 4 HOURS PRN
Status: DISCONTINUED | OUTPATIENT
Start: 2023-05-29 | End: 2023-05-31 | Stop reason: HOSPADM

## 2023-05-29 RX ORDER — ONDANSETRON 2 MG/ML
INJECTION INTRAMUSCULAR; INTRAVENOUS AS NEEDED
Status: DISCONTINUED | OUTPATIENT
Start: 2023-05-29 | End: 2023-05-29 | Stop reason: SURG

## 2023-05-29 RX ORDER — NICOTINE POLACRILEX 4 MG
15 LOZENGE BUCCAL
Status: DISCONTINUED | OUTPATIENT
Start: 2023-05-29 | End: 2023-05-31 | Stop reason: HOSPADM

## 2023-05-29 RX ORDER — FENTANYL CITRATE 50 UG/ML
INJECTION, SOLUTION INTRAMUSCULAR; INTRAVENOUS AS NEEDED
Status: DISCONTINUED | OUTPATIENT
Start: 2023-05-29 | End: 2023-05-29 | Stop reason: SURG

## 2023-05-29 RX ORDER — INSULIN LISPRO 100 [IU]/ML
2-7 INJECTION, SOLUTION INTRAVENOUS; SUBCUTANEOUS
Status: DISCONTINUED | OUTPATIENT
Start: 2023-05-29 | End: 2023-05-31 | Stop reason: HOSPADM

## 2023-05-29 RX ORDER — ATORVASTATIN CALCIUM 10 MG/1
10 TABLET, FILM COATED ORAL NIGHTLY
Status: DISCONTINUED | OUTPATIENT
Start: 2023-05-29 | End: 2023-05-31 | Stop reason: HOSPADM

## 2023-05-29 RX ORDER — DROPERIDOL 2.5 MG/ML
0.62 INJECTION, SOLUTION INTRAMUSCULAR; INTRAVENOUS ONCE AS NEEDED
Status: DISCONTINUED | OUTPATIENT
Start: 2023-05-29 | End: 2023-05-29 | Stop reason: HOSPADM

## 2023-05-29 RX ORDER — LABETALOL HYDROCHLORIDE 5 MG/ML
5 INJECTION, SOLUTION INTRAVENOUS
Status: DISCONTINUED | OUTPATIENT
Start: 2023-05-29 | End: 2023-05-29 | Stop reason: HOSPADM

## 2023-05-29 RX ORDER — SODIUM CHLORIDE 9 MG/ML
40 INJECTION, SOLUTION INTRAVENOUS AS NEEDED
Status: DISCONTINUED | OUTPATIENT
Start: 2023-05-29 | End: 2023-05-31 | Stop reason: HOSPADM

## 2023-05-29 RX ORDER — SUCCINYLCHOLINE/SOD CL,ISO/PF 200MG/10ML
SYRINGE (ML) INTRAVENOUS AS NEEDED
Status: DISCONTINUED | OUTPATIENT
Start: 2023-05-29 | End: 2023-05-29 | Stop reason: SURG

## 2023-05-29 RX ORDER — ROCURONIUM BROMIDE 10 MG/ML
INJECTION, SOLUTION INTRAVENOUS AS NEEDED
Status: DISCONTINUED | OUTPATIENT
Start: 2023-05-29 | End: 2023-05-29 | Stop reason: SURG

## 2023-05-29 RX ORDER — LIDOCAINE HYDROCHLORIDE 20 MG/ML
INJECTION, SOLUTION EPIDURAL; INFILTRATION; INTRACAUDAL; PERINEURAL AS NEEDED
Status: DISCONTINUED | OUTPATIENT
Start: 2023-05-29 | End: 2023-05-29 | Stop reason: SURG

## 2023-05-29 RX ORDER — ONDANSETRON 2 MG/ML
4 INJECTION INTRAMUSCULAR; INTRAVENOUS
Status: DISCONTINUED | OUTPATIENT
Start: 2023-05-29 | End: 2023-05-29 | Stop reason: HOSPADM

## 2023-05-29 RX ORDER — TAMSULOSIN HYDROCHLORIDE 0.4 MG/1
0.8 CAPSULE ORAL DAILY
Status: DISCONTINUED | OUTPATIENT
Start: 2023-05-29 | End: 2023-05-31 | Stop reason: HOSPADM

## 2023-05-29 RX ORDER — NALOXONE HCL 0.4 MG/ML
0.04 VIAL (ML) INJECTION AS NEEDED
Status: DISCONTINUED | OUTPATIENT
Start: 2023-05-29 | End: 2023-05-29 | Stop reason: HOSPADM

## 2023-05-29 RX ORDER — FENTANYL CITRATE 50 UG/ML
25 INJECTION, SOLUTION INTRAMUSCULAR; INTRAVENOUS
Status: DISCONTINUED | OUTPATIENT
Start: 2023-05-29 | End: 2023-05-29 | Stop reason: HOSPADM

## 2023-05-29 RX ORDER — SODIUM CHLORIDE 0.9 % (FLUSH) 0.9 %
10 SYRINGE (ML) INJECTION EVERY 12 HOURS SCHEDULED
Status: DISCONTINUED | OUTPATIENT
Start: 2023-05-29 | End: 2023-05-31 | Stop reason: HOSPADM

## 2023-05-29 RX ORDER — DEXTROSE MONOHYDRATE 25 G/50ML
25 INJECTION, SOLUTION INTRAVENOUS
Status: DISCONTINUED | OUTPATIENT
Start: 2023-05-29 | End: 2023-05-31 | Stop reason: HOSPADM

## 2023-05-29 RX ORDER — MAGNESIUM HYDROXIDE 1200 MG/15ML
LIQUID ORAL AS NEEDED
Status: DISCONTINUED | OUTPATIENT
Start: 2023-05-29 | End: 2023-05-29 | Stop reason: HOSPADM

## 2023-05-29 RX ADMIN — SUGAMMADEX 200 MG: 100 INJECTION, SOLUTION INTRAVENOUS at 20:59

## 2023-05-29 RX ADMIN — DILTIAZEM HYDROCHLORIDE 5 MG/HR: 5 INJECTION INTRAVENOUS at 13:32

## 2023-05-29 RX ADMIN — DILTIAZEM HYDROCHLORIDE 20 MG: 5 INJECTION INTRAVENOUS at 12:39

## 2023-05-29 RX ADMIN — ROCURONIUM BROMIDE 20 MG: 10 INJECTION, SOLUTION INTRAVENOUS at 20:44

## 2023-05-29 RX ADMIN — SODIUM CHLORIDE, POTASSIUM CHLORIDE, SODIUM LACTATE AND CALCIUM CHLORIDE: 600; 310; 30; 20 INJECTION, SOLUTION INTRAVENOUS at 20:24

## 2023-05-29 RX ADMIN — LISINOPRIL 10 MG: 10 TABLET ORAL at 18:28

## 2023-05-29 RX ADMIN — LIDOCAINE HYDROCHLORIDE 100 MG: 20 INJECTION, SOLUTION EPIDURAL; INFILTRATION; INTRACAUDAL at 20:30

## 2023-05-29 RX ADMIN — FENTANYL CITRATE 50 MCG: 50 INJECTION, SOLUTION INTRAMUSCULAR; INTRAVENOUS at 20:30

## 2023-05-29 RX ADMIN — PROPOFOL INJECTABLE EMULSION 140 MG: 10 INJECTION, EMULSION INTRAVENOUS at 20:30

## 2023-05-29 RX ADMIN — Medication 200 MCG: at 21:01

## 2023-05-29 RX ADMIN — SODIUM CHLORIDE 1000 ML: 9 INJECTION, SOLUTION INTRAVENOUS at 11:58

## 2023-05-29 RX ADMIN — HYDROCODONE BITARTRATE AND ACETAMINOPHEN 1 TABLET: 5; 325 TABLET ORAL at 23:56

## 2023-05-29 RX ADMIN — Medication 200 MCG: at 20:49

## 2023-05-29 RX ADMIN — HYDROMORPHONE HYDROCHLORIDE 0.5 MG: 1 INJECTION, SOLUTION INTRAMUSCULAR; INTRAVENOUS; SUBCUTANEOUS at 21:24

## 2023-05-29 RX ADMIN — Medication 160 MG: at 20:30

## 2023-05-29 RX ADMIN — IOPAMIDOL 100 ML: 755 INJECTION, SOLUTION INTRAVENOUS at 14:28

## 2023-05-29 RX ADMIN — ROCURONIUM BROMIDE 10 MG: 10 INJECTION, SOLUTION INTRAVENOUS at 20:30

## 2023-05-29 RX ADMIN — ONDANSETRON 4 MG: 2 INJECTION INTRAMUSCULAR; INTRAVENOUS at 20:57

## 2023-05-29 RX ADMIN — CEFTRIAXONE 1 G: 1 INJECTION, POWDER, FOR SOLUTION INTRAMUSCULAR; INTRAVENOUS at 16:48

## 2023-05-29 RX ADMIN — TAMSULOSIN HYDROCHLORIDE 0.8 MG: 0.4 CAPSULE ORAL at 18:28

## 2023-05-29 RX ADMIN — FENTANYL CITRATE 50 MCG: 50 INJECTION, SOLUTION INTRAMUSCULAR; INTRAVENOUS at 20:44

## 2023-05-29 RX ADMIN — DILTIAZEM HYDROCHLORIDE 20 MG: 5 INJECTION INTRAVENOUS at 12:07

## 2023-05-29 RX ADMIN — Medication 200 MCG: at 20:34

## 2023-05-29 RX ADMIN — HYDROMORPHONE HYDROCHLORIDE 0.5 MG: 1 INJECTION, SOLUTION INTRAMUSCULAR; INTRAVENOUS; SUBCUTANEOUS at 21:34

## 2023-05-29 NOTE — ED NOTES
Pt yang was removed via nurse per Dr. Dc verbal orders. Pt was able to urinate post cath removal into urinal. 50mls noted in urinal. Color was grossly bloody. Dr. Hunt notified.

## 2023-05-29 NOTE — CONSULTS
[facility] CONSULT NOTE    Patient Care Team:  Feliciano Kinsey MD as PCP - General  Feliciano Kinsey MD as PCP - Family Medicine  Camron Bowden MD as Consulting Physician (Urology)  No Known Provider  REASON FOR REFERRAL: Atrial fibrillation  Chief complaint: Penile/pelvic pain    Subjective     Patient is a 72 y.o. male with no known cardiac conditions to presented to the ER with gross hematuria and suprapubic and penile pain.  He had been seen in the ER with inability to urinate on May 25, discharged home with a Law.  He noticed clots in the Law that started thereafter, followed by worsened pain.  He has denied any palpitations, shortness of breath, or chest pain.  Upon arrival in the ER, he was noted to be in atrial fibrillation with rapid ventricular response.  With UTI, leukopenia, and tachycardia, he was initially treated for potential sepsis and received 1 L of IV fluids.  With that, ventricular rate slowed some, and he was also then started on a Cardizem infusion.  He is currently on a Cardizem infusion at 10 mg/h during my evaluation, and still complaining of no cardiac symptoms whatsoever.    Review of Systems   Review of Systems   Constitutional:  Negative for appetite change, chills and fever.   HENT: Negative.     Eyes: Negative.    Respiratory:  Negative for cough, shortness of breath and wheezing.    Cardiovascular:  Negative for chest pain, palpitations and leg swelling.   Gastrointestinal:  Negative for abdominal pain and blood in stool.   Endocrine: Negative.    Genitourinary:  Positive for decreased urine volume, difficulty urinating, hematuria and penile pain. Negative for flank pain.   Musculoskeletal: Negative.    Skin: Negative.    Allergic/Immunologic: Negative.    Neurological: Negative.    Hematological:  Negative for adenopathy. Does not bruise/bleed easily.   Psychiatric/Behavioral: Negative.       History  Past Medical History:   Diagnosis  Date    Acute UTI (urinary tract infection) 2023    Arthritis     Benign prostatic hyperplasia 12    Diabetes mellitus     Elevated cholesterol     Enlarged prostate     Erectile dysfunction 17    Hypertension     Rotator cuff disorder, right     Sleep apnea      Past Surgical History:   Procedure Laterality Date    COLONOSCOPY  2016    polyp removed from 95 cm.a long [id number] colon not allowing mitchell safr colonoscopy    COLONOSCOPY N/A 2021    Procedure: COLONOSCOPY WITH ANESTHESIA;  Surgeon: Rick Dixon DO;  Location:  [facility];  Service: Gastroenterology;  Laterality: N/A;  pre op: hx polyps  post op:normal  PCP: Feliciano Kinsey MD    CYSTOSCOPY TRANSURETHRAL RESECTION OF PROSTATE N/A 2022    Procedure: TRANSURETHRAL RESECTION OF PROSTATE;  Surgeon: Camron Bowden MD;  Location:  [facility] OR;  Service: Urology;  Laterality: N/A;    HERNIA REPAIR      PROSTATE SURGERY  22    SHOULDER ARTHROSCOPY W/ ROTATOR CUFF REPAIR Right 2020    Procedure: RIGHT SHOULDER  ROTATOR CUFF REPAIR, SUBACROMIAL DECOMPRESSION;  Surgeon: Alphonso Lundberg MD;  Location:  [facility] OR;  Service: Orthopedics;  Laterality: Right;    VARICOCELE EXCISION  2/15/82    VARICOSE VEIN SURGERY       Family History   Problem Relation Age of Onset    No Known Problems Mother     Diabetes Father         Type 2 diabetic; hes 91    Cancer Maternal Grandfather          in  of prostrate cancer; he was 81    Cancer Paternal Grandmother          of lung cancer ; he was 67    Diabetes Paternal Grandfather           of pneumonia; was 70; type 2 diabetes    Cancer Paternal Uncle         He  of leukemia; ; he was 49    Colon cancer Neg Hx     Colon polyps Neg Hx     Esophageal cancer Neg Hx      Social History     Tobacco Use    Smoking status: Former     Packs/day: 1.00     Years: 40.00     Pack years: 40.00     Types: Cigarettes     Start date: 1967     Quit  date: 12/15/2015     Years since quittin.4    Smokeless tobacco: Never    Tobacco comments:     Never again   Vaping Use    Vaping Use: Never used   Substance Use Topics    Alcohol use: No    Drug use: Never     Facility-Administered Medications Prior to Admission   Medication Dose Route Frequency Provider Last Rate Last Admin    lidocaine (XYLOCAINE) 1 % injection 10 mL  10 mL Infiltration Once Michael Macias MD         Medications Prior to Admission   Medication Sig Dispense Refill Last Dose    Cod Liver Oil 1000 MG capsule Take 2 capsules by mouth Daily.       lisinopril (PRINIVIL,ZESTRIL) 10 MG tablet Take 1 tablet by mouth Daily.       metFORMIN (GLUCOPHAGE) 500 MG tablet Take 1 tablet by mouth 2 (Two) Times a Day With Meals.       simvastatin (ZOCOR) 20 MG tablet Take 2 tablets by mouth Every Night.       tamsulosin (FLOMAX) 0.4 MG capsule 24 hr capsule Take 1 capsule by mouth Daily. (Patient taking differently: Take 2 capsules by mouth Daily.) 90 capsule 3     furosemide (LASIX) 40 MG tablet Take 1 tablet by mouth Daily As Needed.       sildenafil (VIAGRA) 100 MG tablet Take 1 tablet by mouth Take As Directed. 1-4 hours before sexual activity 30 tablet 5      Allergies:  Percocet [oxycodone-acetaminophen]    Objective     Vital Signs  Temp:  [98.3 °F (36.8 °C)-100 °F (37.8 °C)] 100 °F (37.8 °C)  Heart Rate:  [100-145] 110  Resp:  [18] 18  BP: (112-153)/(60-97) 141/73    Physical Exam:  Vitals and nursing note reviewed.   Constitutional:       General: Not in acute distress.     Appearance: Acutely ill-appearing.      Comments: Appears uncomfortable, but non-toxic   Neck:      Vascular: No JVD or JVR. JVD normal.   Pulmonary:      Effort: Pulmonary effort is normal.      Breath sounds: Normal breath sounds.   Cardiovascular:      Tachycardia present. Irregularly irregular rhythm.      Murmurs: There is no murmur.      No gallop.  No rub.   Pulses:     Intact distal pulses.   Edema:     Peripheral edema  absent.   Skin:     General: Skin is warm and dry.   Neurological:      Mental Status: Alert, oriented to person, place, and time and oriented to person, place and time.     Results Review:   Lab Results (last 72 hours)       Procedure Component Value Units Date/Time    STAT Lactic Acid, Reflex [[id number]]  (Normal) Collected: 05/29/23 1323    Specimen: Blood Updated: 05/29/23 1350     Lactate 1.6 mmol/L     Urinalysis, Microscopic Only - Urine, Catheter [[id number]]  (Abnormal) Collected: 05/29/23 1221    Specimen: Urine, Catheter Updated: 05/29/23 1301     RBC, UA Too Numerous to Count /HPF      WBC, UA 31-50 /HPF      Bacteria, UA 1+ /HPF      Squamous Epithelial Cells, UA 0-2 /HPF      Hyaline Casts, UA 0-2 /LPF      Methodology Manual Light Microscopy    Narrative:      Microscopic examination performed on non-centrifuged sample due to increased RBCs.     Urinalysis With Culture If Indicated - Urine, Catheter [[id number]]  (Abnormal) Collected: 05/29/23 1221    Specimen: Urine, Catheter Updated: 05/29/23 1301     Color, UA Red     Appearance, UA Turbid     pH, UA 6.0     Specific Gravity, UA 1.013     Glucose, UA Negative     Ketones, UA Negative     Bilirubin, UA Large (3+)     Blood, UA Moderate (2+)     Protein,  mg/dL (2+)     Leuk Esterase, UA Large (3+)     Nitrite, UA Positive     Urobilinogen, UA 0.2 E.U./dL    Narrative:      In absence of clinical symptoms, the presence of pyuria, bacteria, and/or nitrites on the urinalysis result does not correlate with infection.    Urine Culture - Urine, Urine, Catheter [[id number]] Collected: 05/29/23 1221    Specimen: Urine, Catheter Updated: 05/29/23 1301    High Sensitivity Troponin T 2Hr [[id number]]  (Abnormal) Collected: 05/29/23 1232    Specimen: Blood Updated: 05/29/23 1258     HS Troponin T 17 ng/L      Troponin T Delta -9 ng/L     Narrative:      High Sensitive Troponin T Reference Range:  <10.0 ng/L- Negative Female for AMI  <15.0 ng/L- Negative Male  "for AMI  >=10 - Abnormal Female indicating possible myocardial injury.  >=15 - Abnormal Male indicating possible myocardial injury.   Clinicians would have to utilize clinical acumen, EKG, Troponin, and serial changes to determine if it is an Acute Myocardial Infarction or myocardial injury due to an underlying chronic condition.         Blood Culture - Blood, Arm, Right [[id number]] Collected: 05/29/23 1232    Specimen: Blood from Arm, Right Updated: 05/29/23 1241    Blood Culture - Blood, Arm, Right [[id number]] Collected: 05/29/23 1215    Specimen: Blood from Arm, Right Updated: 05/29/23 1240    Lactic Acid, Plasma [[id number]]  (Abnormal) Collected: 05/29/23 1154    Specimen: Blood Updated: 05/29/23 1238     Lactate 2.1 mmol/L     Procalcitonin [[id number]]  (Abnormal) Collected: 05/29/23 1154    Specimen: Blood Updated: 05/29/23 1230     Procalcitonin 0.69 ng/mL     Narrative:      As a Marker for Sepsis (Non-Neonates):    1. <0.5 ng/mL represents a low risk of severe sepsis and/or septic shock.  2. >2 ng/mL represents a high risk of severe sepsis and/or septic shock.    As a Marker for Lower Respiratory Tract Infections that require antibiotic therapy:    PCT on Admission    Antibiotic Therapy       6-12 Hrs later    >0.5                Strongly Recommended  >0.25 - <0.5        Recommended   0.1 - 0.25          Discouraged              Remeasure/reassess PCT  <0.1                Strongly Discouraged     Remeasure/reassess PCT    As 28 day mortality risk marker: \"Change in Procalcitonin Result\" (>80% or <=80%) if Day 0 (or Day 1) and Day 4 values are available. Refer to http://[email or web address]-pct-calculator.[email or web address]    Change in PCT <=80%  A decrease of PCT levels below or equal to 80% defines a positive change in PCT test result representing a higher risk for 28-day all-cause mortality of patients diagnosed with severe sepsis for septic shock.    Change in PCT >80%  A decrease of PCT levels of more than 80% defines a " negative change in PCT result representing a lower risk for 28-day all-cause mortality of patients diagnosed with severe sepsis or septic shock.       Comprehensive Metabolic Panel [[id number]]  (Abnormal) Collected: 05/29/23 1154    Specimen: Blood Updated: 05/29/23 1224     Glucose 164 mg/dL      BUN 28 mg/dL      Creatinine 1.29 mg/dL      Sodium 143 mmol/L      Potassium 3.9 mmol/L      Chloride 107 mmol/L      CO2 24.0 mmol/L      Calcium 9.0 mg/dL      Total Protein 6.7 g/dL      Albumin 4.1 g/dL      ALT (SGPT) 11 U/L      AST (SGOT) 12 U/L      Alkaline Phosphatase 90 U/L      Total Bilirubin 0.8 mg/dL      Globulin 2.6 gm/dL      A/G Ratio 1.6 g/dL      BUN/Creatinine Ratio 21.7     Anion Gap 12.0 mmol/L      eGFR 59.3 mL/min/1.73     Narrative:      GFR Normal >60  Chronic Kidney Disease <60  Kidney Failure <15    The GFR formula is only valid for adults with stable renal function between ages 18 and 70.    Magnesium [[id number]]  (Normal) Collected: 05/29/23 1154    Specimen: Blood Updated: 05/29/23 1224     Magnesium 1.8 mg/dL     CK [[id number]]  (Normal) Collected: 05/29/23 1154    Specimen: Blood Updated: 05/29/23 1223     Creatine Kinase 46 U/L     High Sensitivity Troponin T [[id number]]  (Abnormal) Collected: 05/29/23 1154    Specimen: Blood Updated: 05/29/23 1220     HS Troponin T 26 ng/L     Narrative:      High Sensitive Troponin T Reference Range:  <10.0 ng/L- Negative Female for AMI  <15.0 ng/L- Negative Male for AMI  >=10 - Abnormal Female indicating possible myocardial injury.  >=15 - Abnormal Male indicating possible myocardial injury.   Clinicians would have to utilize clinical acumen, EKG, Troponin, and serial changes to determine if it is an Acute Myocardial Infarction or myocardial injury due to an underlying chronic condition.         D-dimer, Quantitative [[id number]]  (Abnormal) Collected: 05/29/23 1154    Specimen: Blood Updated: 05/29/23 1211     D-Dimer, Quantitative 3.60 MCGFEU/mL      "Narrative:      According to the assay 's published package insert, a normal (<0.50 MCGFEU/mL) D-dimer result in conjunction with a non-high clinical probability assessment, excludes deep vein thrombosis (DVT) and pulmonary embolism (PE) with high sensitivity.    D-dimer values increase with age and this can make VTE exclusion of an older population difficult. To address this, the [employer], based on best available evidence and recent guidelines, recommends that clinicians use age-adjusted D-dimer thresholds in patients greater than 50 years of age with: a) a low probability of PE who do not meet all Pulmonary Embolism Rule Out Criteria, or b) in those with intermediate probability of PE.   The formula for an age-adjusted D-dimer cut-off is \"age/100\".  For example, a 60 year old patient would have an age-adjusted cut-off of 0.60 MCGFEU/mL and an 80 year old 0.80 MCGFEU/mL.    CBC & Differential [[id number]]  (Abnormal) Collected: 05/29/23 1154    Specimen: Blood Updated: 05/29/23 1208    Narrative:      The following orders were created for panel order CBC & Differential.  Procedure                               Abnormality         Status                     ---------                               -----------         ------                     CBC Auto Differential[[id number]]        Abnormal            Final result                 Please view results for these tests on the individual orders.    CBC Auto Differential [[id number]]  (Abnormal) Collected: 05/29/23 1154    Specimen: Blood Updated: 05/29/23 1208     WBC 1.97 10*3/mm3      RBC 4.33 10*6/mm3      Hemoglobin 12.6 g/dL      Hematocrit 41.4 %      MCV 95.6 fL      MCH 29.1 pg      MCHC 30.4 g/dL      RDW 12.4 %      RDW-SD 43.6 fl      MPV 10.3 fL      Platelets 144 10*3/mm3      Neutrophil % 90.4 %      Lymphocyte % 7.1 %      Monocyte % 0.5 %      Eosinophil % 1.0 %      Basophil % 0.5 %      Immature Grans % 0.5 %      " Neutrophils, Absolute 1.78 10*3/mm3      Lymphocytes, Absolute 0.14 10*3/mm3      Monocytes, Absolute 0.01 10*3/mm3      Eosinophils, Absolute 0.02 10*3/mm3      Basophils, Absolute 0.01 10*3/mm3      Immature Grans, Absolute 0.01 10*3/mm3      nRBC 0.0 /100 WBC             ECG reviewed, my interpretation:5/29/2023 at 5/29/2023 at 1149: Atrial fibrillation, rate 133 bpm, nonspecific T wave abnormalities.    CXR personally visualized, my interpretation: No acute cardiopulmonary process    Telemetry reviewed: Atrial fibrillation, rates 110s    Assessment & Plan       New onset a-fib    Essential (primary) hypertension    Familial hypercholesterolemia    Obstructive sleep apnea    Type 2 diabetes mellitus without complication, without long-term current use of insulin    Acute UTI (urinary tract infection)    Gross hematuria     New onset atrial fibrillation, with RVR: Clinically stable, asymptomatic.  Of unknown duration.  -Discussed with nursing staff and admitting physician to aggressively use IV fluid resuscitation, particular in light of apparent sepsis secondary to urinary tract infection.  IF ventricular rates, at rest, remain consistently in the 130s or higher, and do not respond to IV fluid boluses, then can increase Cardizem drip further.  Otherwise, I would target a ventricular rate in the 110s-120s at rest to help allow for appropriate chronotropic response to underlying processes currently at play.  -Hold off on anticoagulation until hematuria/clotting issues have been resolved  -We will continue to follow on telemetry  -Check echocardiogram tomorrow      2.  ROSCOE: pt reports compliance with CPAP; continue use.    I discussed the patient's findings and my recommendations with patient and CCU nursing staff.     Aris Mayfield MD  05/29/23  18:28 CDT

## 2023-05-29 NOTE — CONSULTS
Urology    Mr. Auguste is 72 y.o. male    REASON FOR CONSULT/CHIEF COMPLAINT: Hematuria    HPI  Patient known to me having undergone previous TURP for BPH with obstruction.  He also had incidental finding of Martha grade 3+4 = 7 adenocarcinoma prostate in 5% of the specimen.  Over the last few weeks he has had progressive difficulty voiding.  He went in urinary retention requiring a Law catheter placed by Dr. Lopez.  This is a complex catheterization required a coudé tip catheter.  Patient went home with his catheter with follow-up arranged.    Over the weekend he is experienced worsening symptoms.  He has had a lot of hematuria.  The catheter clotted off.  After they removed it nursing staff been unable to place the catheter.  They did manage to get a straight cath in him to drain the bladder of over 300 mL.  His bladder scan shows greater than 999 but his body habitus likely makes it inaccurate.     His pain is all been lower abdominal pain.  He denies any flank pain to go with this.      The following portions of the patient's history were reviewed and updated as appropriate: allergies, current medications, past family history, past medical history, past social history, past surgical history and problem list.    Review of Systems      Facility-Administered Medications Prior to Admission   Medication Dose Route Frequency Provider Last Rate Last Admin    lidocaine (XYLOCAINE) 1 % injection 10 mL  10 mL Infiltration Once Michael Macias MD         Medications Prior to Admission   Medication Sig Dispense Refill Last Dose    Cod Liver Oil 1000 MG capsule Take 2 capsules by mouth Daily.       lisinopril (PRINIVIL,ZESTRIL) 10 MG tablet Take 1 tablet by mouth Daily.       metFORMIN (GLUCOPHAGE) 500 MG tablet Take 1 tablet by mouth 2 (Two) Times a Day With Meals.       simvastatin (ZOCOR) 20 MG tablet Take 2 tablets by mouth Every Night.       tamsulosin (FLOMAX) 0.4 MG capsule 24 hr capsule Take 1 capsule by  mouth Daily. (Patient taking differently: Take 2 capsules by mouth Daily.) 90 capsule 3     furosemide (LASIX) 40 MG tablet Take 1 tablet by mouth Daily As Needed.       sildenafil (VIAGRA) 100 MG tablet Take 1 tablet by mouth Take As Directed. 1-4 hours before sexual activity 30 tablet 5          Current Facility-Administered Medications:     atorvastatin (LIPITOR) tablet 10 mg, 10 mg, Oral, Nightly, Suzanna Clemons    [START ON 5/30/2023] cefTRIAXone (ROCEPHIN) 1 g in sodium chloride 0.9 % 100 mL IVPB, 1 g, Intravenous, Q24H, Suzanna Clemons    dextrose (D50W) (25 g/50 mL) IV injection 25 g, 25 g, Intravenous, Q15 Min PRN, Suzanna Clemons    dextrose (GLUTOSE) oral gel 15 g, 15 g, Oral, Q15 Min PRN, Suzanna Clemons    dilTIAZem (CARDIZEM) 125 mg in 125 mL NS infusion, 5-15 mg/hr, Intravenous, Titrated, Ru Hunt MD, Last Rate: 10 mL/hr at 05/29/23 1353, 10 mg/hr at 05/29/23 1353    glucagon (GLUCAGEN) injection 1 mg, 1 mg, Intramuscular, Q15 Min PRN, Suzanna Clemons    Insulin Lispro (humaLOG) injection 2-7 Units, 2-7 Units, Subcutaneous, 4x Daily AC & at Bedtime, Suzanna Clemons    lisinopril (PRINIVIL,ZESTRIL) tablet 10 mg, 10 mg, Oral, Daily, Suzanna Clemons, 10 mg at 05/29/23 1828    Magnesium Cardiology Dose Replacement - Follow Nurse / BPA Driven Protocol, , Does not apply, PRNDeonte Frances Marie    Potassium Replacement - Follow Nurse / BPA Driven Protocol, , Does not apply, Deonte SCHWARTZ Frances Marie    [COMPLETED] Insert Peripheral IV, , , Once **AND** sodium chloride 0.9 % flush 10 mL, 10 mL, Intravenous, PRN, Ru Hunt MD    sodium chloride 0.9 % flush 10 mL, 10 mL, Intravenous, Q12H, Suzanna Clemons    sodium chloride 0.9 % flush 10 mL, 10 mL, Intravenous, PRN, Suzanna Clemons    sodium chloride 0.9 % infusion 40 mL, 40 mL, Intravenous, PRN, Suzanna Clemons    tamsulosin (FLOMAX) 24 hr capsule 0.8 mg, 0.8 mg, Oral, Daily, Suzanna Clemons, 0.8 mg at  23    Past Medical History:   Diagnosis Date    Acute UTI (urinary tract infection) 2023    Arthritis     Benign prostatic hyperplasia 12    Diabetes mellitus     Elevated cholesterol     Enlarged prostate     Erectile dysfunction 17    Hypertension     Rotator cuff disorder, right     Sleep apnea        Past Surgical History:   Procedure Laterality Date    COLONOSCOPY  2016    polyp removed from 95 cm.a long [id number] colon not allowing mitchell safr colonoscopy    COLONOSCOPY N/A 2021    Procedure: COLONOSCOPY WITH ANESTHESIA;  Surgeon: Rick Dixon DO;  Location:  [facility];  Service: Gastroenterology;  Laterality: N/A;  pre op: hx polyps  post op:normal  PCP: Feliciano Kinsey MD    CYSTOSCOPY TRANSURETHRAL RESECTION OF PROSTATE N/A 2022    Procedure: TRANSURETHRAL RESECTION OF PROSTATE;  Surgeon: Camron Bowden MD;  Location:  [facility];  Service: Urology;  Laterality: N/A;    HERNIA REPAIR      PROSTATE SURGERY  22    SHOULDER ARTHROSCOPY W/ ROTATOR CUFF REPAIR Right 2020    Procedure: RIGHT SHOULDER  ROTATOR CUFF REPAIR, SUBACROMIAL DECOMPRESSION;  Surgeon: Alphonso Lundberg MD;  Location:  [facility];  Service: Orthopedics;  Laterality: Right;    VARICOCELE EXCISION  2/15/82    VARICOSE VEIN SURGERY         Social History     Socioeconomic History    Marital status:    Tobacco Use    Smoking status: Former     Packs/day: 1.00     Years: 40.00     Pack years: 40.00     Types: Cigarettes     Start date: 1967     Quit date: 12/15/2015     Years since quittin.4    Smokeless tobacco: Never    Tobacco comments:     Never again   Vaping Use    Vaping Use: Never used   Substance and Sexual Activity    Alcohol use: No    Drug use: Never    Sexual activity: Yes     Comment: My wife; I’m        Family History   Problem Relation Age of Onset    No Known Problems Mother     Diabetes Father         Type 2 diabetic; hes 91     "Cancer Maternal Grandfather          in  of prostrate cancer; he was 81    Cancer Paternal Grandmother          of lung cancer ; he was 67    Diabetes Paternal Grandfather           of pneumonia; was 70; type 2 diabetes    Cancer Paternal Uncle         He  of leukemia; ; he was 49    Colon cancer Neg Hx     Colon polyps Neg Hx     Esophageal cancer Neg Hx        /73   Pulse 110   Temp 100 °F (37.8 °C) (Oral)   Resp 18   Ht 190.5 cm (75\")   Wt 131 kg (287 lb 12.8 oz)   SpO2 95%   BMI 35.97 kg/m²     Physical Exam  Constitutional:   Temp:  [98.3 °F (36.8 °C)-100 °F (37.8 °C)] 100 °F (37.8 °C)  Heart Rate:  [] 98  Resp:  [18] 18  BP: (112-153)/(60-97) 139/78  ] Well developed, well nourished, no distress  Respiratory:   Effort unlabored; Movements symmetric  GI:   No mass or hernia noted, not distended or tender   No enlargement of spleen or liver noted  :   Bladder is not palpably distended.  He is got blood at the external urethral meatus where they have attempted to place Law catheter.    Skin:   No pallor or cyanosis; No obvious rash  Psych:   Alert, Oriented x 3         Lab Results   Component Value Date    GLUCOSE 164 (H) 2023    BUN 28 (H) 2023    CREATININE 1.29 (H) 2023    EGFRIFNONA 60 (A) 2022    EGFRIFAFRI >59 2022    BCR 21.7 2023    CO2 24.0 2023    CALCIUM 9.0 2023    ALBUMIN 4.1 2023    AST 12 2023    ALT 11 2023     Lab Results   Component Value Date    GLUCOSE 164 (H) 2023    CALCIUM 9.0 2023     2023    K 3.9 2023    CO2 24.0 2023     2023    BUN 28 (H) 2023    CREATININE 1.29 (H) 2023    EGFRIFAFRI >59 2022    EGFRIFNONA 60 (A) 2022    BCR 21.7 2023    ANIONGAP 12.0 2023     Lab Results   Component Value Date    WBC 1.97 (C) 2023    HGB 12.6 (L) 2023    HCT 41.4 2023    MCV 95.6 " 05/29/2023     05/29/2023     Lab Results   Component Value Date    PSA 0.646 03/02/2023    PSA 0.47 01/05/2023    PSA 0.608 09/01/2022     No results found for: URINECX  Brief Urine Lab Results  (Last result in the past 365 days)        Color   Clarity   Blood   Leuk Est   Nitrite   Protein   CREAT   Urine HCG        05/29/23 1221 Red   Turbid   Moderate (2+)   Large (3+)   Positive   100 mg/dL (2+)                   Imaging Results (Last 7 Days)       Procedure Component Value Units Date/Time    CT Abdomen Pelvis With Contrast [[id number]] Collected: 05/29/23 1434     Updated: 05/29/23 1443    Narrative:      CT ABDOMEN PELVIS W CONTRAST- 5/29/2023 2:17 PM CDT     HISTORY: Lower abdominal pain       COMPARISON: None.      DOSE LENGTH PRODUCT: 906 mGy cm. Automated exposure control was also  utilized to decrease patient radiation dose.     TECHNIQUE: Following the intravenous administration of contrast, helical  CT tomographic images of the abdomen and pelvis were acquired.  Multiplanar reformatted images were provided for review.      FINDINGS:      LIVER: No suspicious liver lesion. The portal veins are patent..      BILIARY SYSTEM: Cholelithiasis. The gallbladder is nondistended. No  intrahepatic or extrahepatic ductal dilatation.      PANCREAS: No focal pancreatic lesion.      SPLEEN: Unremarkable.      KIDNEYS AND ADRENALS: Adrenal glands are unremarkable. 2 mm  nonobstructing left inferior pole renal stone. Striated nephrogram on  the right. No parenchymal abscess. Ureters are nondistended.     RETROPERITONEUM: No mass, lymphadenopathy or hemorrhage.      GI TRACT: The stomach is nondistended. Small bowel is nondilated. Mild  gaseous distention of the transverse colon. No inflammatory changes  along the colon.      OTHER: There is no mesenteric mass, lymphadenopathy or fluid collection.  The abdominopelvic vasculature is patent. Bones are osteopenic. No acute  bony abnormality seen.       PELVIS: No  mass lesion, fluid collection or significant lymphadenopathy  is seen in the pelvis. 3 mm bladder stone. Mild to moderate bladder  distention.       Impression:      1. Heterogeneous right nephrogram which may reflect an uncomplicated  right pyelonephritis. There is no hydronephrosis.  2. Mild to moderate urinary bladder distention.  3. 3 mm bladder stone.  4. Cholelithiasis. No CT evidence for acute cholecystitis. Gallbladder  is nondistended and appears noninflamed.     This report was finalized on 05/29/2023 14:40 by Dr Mervin Shore, .    CT Angiogram Chest [[id number]] Collected: 05/29/23 1431     Updated: 05/29/23 1437    Narrative:      CT ANGIOGRAM CHEST- 5/29/2023 2:17 PM CDT      HISTORY: Shortness of breath      COMPARISON: None.      DOSE LENGTH PRODUCT: 247 mGy cm. Automated exposure control was also  utilized to decrease patient radiation dose.     TECHNIQUE: Helical tomographic images of the chest were obtained after  the administration of intravenous contrast following angiogram protocol.  Additionally, 3D and multiplanar reformatted images were provided.        FINDINGS:    Pulmonary arteries: There is adequate enhancement of the pulmonary  arteries to evaluate for central and segmental pulmonary emboli. There  are no filling defects within the main, lobar, segmental or visualized  subsegmental pulmonary arteries. The pulmonary arteries are within  normal limits for size.      Aorta and great vessels: Thoracic aorta is normal in caliber. No  dissection identified. No flow-limiting stenosis identified at the great  vessel origins.     Visualized neck base: The imaged portion of the base of the neck appears  unremarkable.      Lungs: The lungs are clear. There is no mass, worrisome nodule, or  consolidation. No pleural effusion is seen. Airways are clear.      Heart: The heart is normal in size. There is no pericardial effusion.     Mediastinum and lymph nodes: No suspicious hilar or  mediastinal  adenopathy..     Skeletal and soft tissues: Chest wall soft tissues are unremarkable. No  acute bony abnormality. Thoracic spine degenerative change..        Impression:      1. No evidence of pulmonary embolus or other acute cardiopulmonary  process.        This report was finalized on 05/29/2023 14:34 by Dr Mervin Shore, .    XR Chest 1 View [[id number]] Collected: 05/29/23 1218     Updated: 05/29/23 1221    Narrative:      Frontal upright radiograph of the chest 5/29/2023 12:10 PM CDT     HISTORY: Shortness of breath     COMPARISON: Chest exam dated 01/07/2019.     FINDINGS:   The lungs are clear. The cardiomediastinal silhouette and pulmonary  vascularity are within normal limits.      The osseous structures and surrounding soft tissues demonstrate no acute  abnormality.       Impression:      1. No radiographic evidence of acute cardiopulmonary process.        This report was finalized on 05/29/2023 12:18 by Dr Mervin Shore, .              Assessment and Plan  Gross Hematuria   Urinary retention  Prostate cancer  Calculus in bladder ? Passed ureteral stone  Patient has been placed on ceftriaxoneUTI    -Since we are unable to place a catheter I am going to take the patient the operating room and get a catheter in the bladder that is in large caliber irrigating cath  .  -Cystoscopy rule out evidence of bladder neck contracture or other abnormality.  We will remove the stone that is in the bladder.    -For urinary tract infection.  I think this is a good choice.  We will await urine cultures.    (Please note that portions of this note were completed with a voice recognition program.)  Camron Bowden MD  05/29/23  18:41 CDT

## 2023-05-29 NOTE — ED NOTES
Nursing report ED to floor  Humberto Auguste Jr.  72 y.o.  male    HPI:   Chief Complaint   Patient presents with    Chills       Admitting doctor:   Suzanna Clemons    Consulting provider(s):  Consults       No orders found from 4/30/2023 to 5/30/2023.             Admitting diagnosis:   The primary encounter diagnosis was Sepsis, due to unspecified organism, unspecified whether acute organ dysfunction present. Diagnoses of Urinary tract infection with hematuria, site unspecified and Atrial fibrillation with RVR were also pertinent to this visit.    Code status:   Current Code Status       Date Active Code Status Order ID Comments User Context       Prior            Allergies:   Percocet [oxycodone-acetaminophen]    Intake and Output    Intake/Output Summary (Last 24 hours) at 5/29/2023 1608  Last data filed at 5/29/2023 1323  Gross per 24 hour   Intake 1000 ml   Output --   Net 1000 ml       Weight:       05/29/23  1112   Weight: 129 kg (284 lb)       Most recent vitals:   Vitals:    05/29/23 1503 05/29/23 1518 05/29/23 1533 05/29/23 1548   BP: 113/61 131/75 129/77 120/65   BP Location:       Patient Position:       Pulse: 103 (!) 122 115 113   Resp:       Temp:       TempSrc:       SpO2: (!) 89% 93% 92% 92%   Weight:       Height:         Oxygen Therapy: .    Active LDAs/IV Access:   Lines, Drains & Airways       Active LDAs       Name Placement date Placement time Site Days    Peripheral IV 05/29/23 1155 Right Antecubital 05/29/23  1155  Antecubital  less than 1    Urethral Catheter Coude 16 Fr. 05/25/23  0820  -- 4                    Labs (abnormal labs have a star):   Labs Reviewed   COMPREHENSIVE METABOLIC PANEL - Abnormal; Notable for the following components:       Result Value    Glucose 164 (*)     BUN 28 (*)     Creatinine 1.29 (*)     eGFR 59.3 (*)     All other components within normal limits    Narrative:     GFR Normal >60  Chronic Kidney Disease <60  Kidney Failure <15    The GFR formula is  "only valid for adults with stable renal function between ages 18 and 70.   LACTIC ACID, PLASMA - Abnormal; Notable for the following components:    Lactate 2.1 (*)     All other components within normal limits   PROCALCITONIN - Abnormal; Notable for the following components:    Procalcitonin 0.69 (*)     All other components within normal limits    Narrative:     As a Marker for Sepsis (Non-Neonates):    1. <0.5 ng/mL represents a low risk of severe sepsis and/or septic shock.  2. >2 ng/mL represents a high risk of severe sepsis and/or septic shock.    As a Marker for Lower Respiratory Tract Infections that require antibiotic therapy:    PCT on Admission    Antibiotic Therapy       6-12 Hrs later    >0.5                Strongly Recommended  >0.25 - <0.5        Recommended   0.1 - 0.25          Discouraged              Remeasure/reassess PCT  <0.1                Strongly Discouraged     Remeasure/reassess PCT    As 28 day mortality risk marker: \"Change in Procalcitonin Result\" (>80% or <=80%) if Day 0 (or Day 1) and Day 4 values are available. Refer to http://www.[employer]-pct-calculator.com    Change in PCT <=80%  A decrease of PCT levels below or equal to 80% defines a positive change in PCT test result representing a higher risk for 28-day all-cause mortality of patients diagnosed with severe sepsis for septic shock.    Change in PCT >80%  A decrease of PCT levels of more than 80% defines a negative change in PCT result representing a lower risk for 28-day all-cause mortality of patients diagnosed with severe sepsis or septic shock.      URINALYSIS W/ CULTURE IF INDICATED - Abnormal; Notable for the following components:    Color, UA Red (*)     Appearance, UA Turbid (*)     Bilirubin, UA Large (3+) (*)     Blood, UA Moderate (2+) (*)     Protein,  mg/dL (2+) (*)     Leuk Esterase, UA Large (3+) (*)     Nitrite, UA Positive (*)     All other components within normal limits    Narrative:     In absence of " "clinical symptoms, the presence of pyuria, bacteria, and/or nitrites on the urinalysis result does not correlate with infection.   D-DIMER, QUANTITATIVE - Abnormal; Notable for the following components:    D-Dimer, Quantitative 3.60 (*)     All other components within normal limits    Narrative:     According to the assay 's published package insert, a normal (<0.50 MCGFEU/mL) D-dimer result in conjunction with a non-high clinical probability assessment, excludes deep vein thrombosis (DVT) and pulmonary embolism (PE) with high sensitivity.    D-dimer values increase with age and this can make VTE exclusion of an older population difficult. To address this, the [employer], based on best available evidence and recent guidelines, recommends that clinicians use age-adjusted D-dimer thresholds in patients greater than 50 years of age with: a) a low probability of PE who do not meet all Pulmonary Embolism Rule Out Criteria, or b) in those with intermediate probability of PE.   The formula for an age-adjusted D-dimer cut-off is \"age/100\".  For example, a 60 year old patient would have an age-adjusted cut-off of 0.60 MCGFEU/mL and an 80 year old 0.80 MCGFEU/mL.   TROPONIN - Abnormal; Notable for the following components:    HS Troponin T 26 (*)     All other components within normal limits    Narrative:     High Sensitive Troponin T Reference Range:  <10.0 ng/L- Negative Female for AMI  <15.0 ng/L- Negative Male for AMI  >=10 - Abnormal Female indicating possible myocardial injury.  >=15 - Abnormal Male indicating possible myocardial injury.   Clinicians would have to utilize clinical acumen, EKG, Troponin, and serial changes to determine if it is an Acute Myocardial Infarction or myocardial injury due to an underlying chronic condition.        CBC WITH AUTO DIFFERENTIAL - Abnormal; Notable for the following components:    WBC 1.97 (*)     Hemoglobin 12.6 (*)     MCHC 30.4 (*)     Neutrophil " % 90.4 (*)     Lymphocyte % 7.1 (*)     Monocyte % 0.5 (*)     Lymphocytes, Absolute 0.14 (*)     Monocytes, Absolute 0.01 (*)     All other components within normal limits   HIGH SENSITIVITIY TROPONIN T 2HR - Abnormal; Notable for the following components:    HS Troponin T 17 (*)     Troponin T Delta -9 (*)     All other components within normal limits    Narrative:     High Sensitive Troponin T Reference Range:  <10.0 ng/L- Negative Female for AMI  <15.0 ng/L- Negative Male for AMI  >=10 - Abnormal Female indicating possible myocardial injury.  >=15 - Abnormal Male indicating possible myocardial injury.   Clinicians would have to utilize clinical acumen, EKG, Troponin, and serial changes to determine if it is an Acute Myocardial Infarction or myocardial injury due to an underlying chronic condition.        URINALYSIS, MICROSCOPIC ONLY - Abnormal; Notable for the following components:    RBC, UA Too Numerous to Count (*)     WBC, UA 31-50 (*)     Bacteria, UA 1+ (*)     All other components within normal limits    Narrative:     Microscopic examination performed on non-centrifuged sample due to increased RBCs.    CK - Normal   MAGNESIUM - Normal   LACTIC ACID, REFLEX - Normal   BLOOD CULTURE   BLOOD CULTURE   URINE CULTURE   CBC AND DIFFERENTIAL    Narrative:     The following orders were created for panel order CBC & Differential.  Procedure                               Abnormality         Status                     ---------                               -----------         ------                     CBC Auto Differential[[id number]]        Abnormal            Final result                 Please view results for these tests on the individual orders.       Meds given in ED:   Medications   sodium chloride 0.9 % flush 10 mL (has no administration in time range)   dilTIAZem (CARDIZEM) 125 mg in 125 mL NS infusion (10 mg/hr Intravenous Rate/Dose Change 5/29/23 5993)   cefTRIAXone (ROCEPHIN) 1 g in sodium chloride 0.9  % 100 mL IVPB (has no administration in time range)   sodium chloride 0.9 % bolus 1,000 mL (0 mL Intravenous Stopped 5/29/23 1323)   dilTIAZem (CARDIZEM) injection 20 mg (20 mg Intravenous Given 5/29/23 1207)   dilTIAZem (CARDIZEM) injection 20 mg (20 mg Intravenous Given 5/29/23 1239)   iopamidol (ISOVUE-370) 76 % injection 100 mL (100 mL Intravenous Given 5/29/23 1428)     dilTIAZem, 5-15 mg/hr, Last Rate: 10 mg/hr (05/29/23 1353)         NIH Stroke Scale:       Isolation/Infection(s):  No active isolations   No active infections     COVID Testing  Collected .  Resulted .    Nursing report ED to floor:  Mental status: .A&Ox4  Ambulatory status: .Ad corbin  Precautions: .none    ED nurse phone extentsion- [phone]  Report given to [facility].

## 2023-05-29 NOTE — ED NOTES
Pt appears to have some bloody drainage around yang. Dr. Hunt verbally stated to remove current catheter and replace with a new one.

## 2023-05-29 NOTE — H&P
[facility] Medicine Services  HISTORY AND PHYSICAL    Date of Admission: 5/29/2023  Primary Care Physician: Feliciano Kinsey MD    Subjective   Primary Historian: Patient and wife    Chief Complaint: Gross hematuria    History of Present Illness  Patient presented to the emergency room with complaints of gross hematuria and bladder and penile pain.  He notes that he started having difficulty with being unable to urinate and came to the ER on May 25.  Law catheter was placed and was told to follow-up with his urologist.  Patient notes he cannot get into see urologist until mid late June.  Today he developed gross hematuria along with suprapubic and penile pain.  He started having bloody clots in his Law.  Patient notes only other time he has had blood in his urine that he knows that was when he had his prostate surgery several years back.    While in the emergency room patient was found to be in atrial fib with a rate of 133.  He was started on a Cardizem drip.  Rate at the time of my examination was     He is also complaining of his bottom burning sensation and sitting on it for so long.  Patient denies chest pain and shortness of breath.  He has been placed on oxygen in the emergency room.  He does not wear it at home.  Oxygen saturation has been 91 to 92%.  Review of Systems   Otherwise complete ROS reviewed and negative except as mentioned in the HPI.    Past Medical History:   Past Medical History:   Diagnosis Date    Acute UTI (urinary tract infection) 5/29/2023    Arthritis     Benign prostatic hyperplasia 2/1/12    Diabetes mellitus     Elevated cholesterol     Enlarged prostate     Erectile dysfunction 2/1/16    Hypertension     Rotator cuff disorder, right     Sleep apnea      Past Surgical History:  Past Surgical History:   Procedure Laterality Date    COLONOSCOPY  06/02/2016    polyp removed from 95 cm.a long [id number] colon not allowing mitchell safr  colonoscopy    COLONOSCOPY N/A 2021    Procedure: COLONOSCOPY WITH ANESTHESIA;  Surgeon: Rick Dixon DO;  Location: [facility] ENDOSCOPY;  Service: Gastroenterology;  Laterality: N/A;  pre op: hx polyps  post op:normal  PCP: Feliciano Kinsey MD    CYSTOSCOPY TRANSURETHRAL RESECTION OF PROSTATE N/A 2022    Procedure: TRANSURETHRAL RESECTION OF PROSTATE;  Surgeon: Camron Bowden MD;  Location:  [facility] OR;  Service: Urology;  Laterality: N/A;    HERNIA REPAIR      PROSTATE SURGERY  22    SHOULDER ARTHROSCOPY W/ ROTATOR CUFF REPAIR Right 2020    Procedure: RIGHT SHOULDER  ROTATOR CUFF REPAIR, SUBACROMIAL DECOMPRESSION;  Surgeon: Alphonso Lundberg MD;  Location:  [facility] OR;  Service: Orthopedics;  Laterality: Right;    VARICOCELE EXCISION  2/15/82    VARICOSE VEIN SURGERY       Social History:  reports that he quit smoking about 7 years ago. His smoking use included cigarettes. He started smoking about 55 years ago. He has a 40.00 pack-year smoking history. He has never used smokeless tobacco. He reports that he does not drink alcohol and does not use drugs.    Family History: family history includes Cancer in his maternal grandfather, paternal grandmother, and paternal uncle; Diabetes in his father and paternal grandfather; No Known Problems in his mother.     Father  at 92.  He had history of congestive heart failure.  Mother is alive at 91 still lives at home by herself and has some back issues.    Allergies:  Allergies   Allergen Reactions    Percocet [Oxycodone-Acetaminophen] Nausea Only       Medications:  Prior to Admission medications    Medication Sig Start Date End Date Taking? Authorizing Provider   Cod Liver Oil 1000 MG capsule Take 2 capsules by mouth Daily.    ProviderSammi MD   furosemide (LASIX) 40 MG tablet Take 1 tablet by mouth Daily As Needed. 18   ProviderSammi MD   lisinopril (PRINIVIL,ZESTRIL) 10 MG tablet Take 1 tablet by mouth Daily.   "  ProviderSammi MD   metFORMIN (GLUCOPHAGE) 500 MG tablet Take 1 tablet by mouth 2 (Two) Times a Day With Meals.    Sammi Suazo MD   sildenafil (VIAGRA) 100 MG tablet Take 1 tablet by mouth Take As Directed. 1-4 hours before sexual activity 3/9/23   Camron Bowden MD   simvastatin (ZOCOR) 20 MG tablet Take 2 tablets by mouth Every Night.    Sammi Suazo MD   tamsulosin (FLOMAX) 0.4 MG capsule 24 hr capsule Take 1 capsule by mouth Daily.  Patient taking differently: Take 2 capsules by mouth Daily. 12/15/20   Camron Bowden MD     I have utilized all available immediate resources to obtain, update, or review the patient's current medications (including all prescriptions, over-the-counter products, herbals, cannabis/cannabidiol products, and vitamin/mineral/dietary (nutritional) supplements).    Objective     Vital Signs: /78   Pulse 105   Temp 100 °F (37.8 °C) (Oral)   Resp 18   Ht 190.5 cm (75\")   Wt 131 kg (287 lb 12.8 oz)   SpO2 96%   BMI 35.97 kg/m²   Physical Exam  Vitals and nursing note reviewed.   Constitutional:       Appearance: Normal appearance. He is obese.   HENT:      Head: Normocephalic and atraumatic.      Right Ear: Tympanic membrane, ear canal and external ear normal.      Left Ear: Tympanic membrane, ear canal and external ear normal.      Nose: Nose normal. No congestion.      Mouth/Throat:      Mouth: Mucous membranes are dry.      Pharynx: Oropharynx is clear.   Eyes:      Extraocular Movements: Extraocular movements intact.      Conjunctiva/sclera: Conjunctivae normal.      Pupils: Pupils are equal, round, and reactive to light.   Cardiovascular:      Rate and Rhythm: Normal rate and regular rhythm.      Pulses: Normal pulses.      Heart sounds: No murmur heard.    No friction rub. No gallop.   Pulmonary:      Effort: Pulmonary effort is normal.      Breath sounds: Normal breath sounds.   Abdominal:      General: Bowel sounds are normal.      " Palpations: Abdomen is soft.   Genitourinary:     Comments: Bloody urine noted  Musculoskeletal:         General: Normal range of motion.      Cervical back: Normal range of motion and neck supple.   Skin:     General: Skin is warm and dry.      Capillary Refill: Capillary refill takes less than 2 seconds.   Neurological:      General: No focal deficit present.      Mental Status: He is alert and oriented to person, place, and time.      Cranial Nerves: No cranial nerve deficit.   Psychiatric:         Mood and Affect: Mood normal.         Behavior: Behavior normal.      Sepsis      Results Reviewed:  Lab Results (last 24 hours)       Procedure Component Value Units Date/Time    STAT Lactic Acid, Reflex [[id number]]  (Normal) Collected: 05/29/23 1323    Specimen: Blood Updated: 05/29/23 1350     Lactate 1.6 mmol/L     Urinalysis, Microscopic Only - Urine, Catheter [[id number]]  (Abnormal) Collected: 05/29/23 1221    Specimen: Urine, Catheter Updated: 05/29/23 1301     RBC, UA Too Numerous to Count /HPF      WBC, UA 31-50 /HPF      Bacteria, UA 1+ /HPF      Squamous Epithelial Cells, UA 0-2 /HPF      Hyaline Casts, UA 0-2 /LPF      Methodology Manual Light Microscopy    Narrative:      Microscopic examination performed on non-centrifuged sample due to increased RBCs.     Urinalysis With Culture If Indicated - Urine, Catheter [[id number]]  (Abnormal) Collected: 05/29/23 1221    Specimen: Urine, Catheter Updated: 05/29/23 1301     Color, UA Red     Appearance, UA Turbid     pH, UA 6.0     Specific Gravity, UA 1.013     Glucose, UA Negative     Ketones, UA Negative     Bilirubin, UA Large (3+)     Blood, UA Moderate (2+)     Protein,  mg/dL (2+)     Leuk Esterase, UA Large (3+)     Nitrite, UA Positive     Urobilinogen, UA 0.2 E.U./dL    Narrative:      In absence of clinical symptoms, the presence of pyuria, bacteria, and/or nitrites on the urinalysis result does not correlate with infection.    Urine Culture -  "Urine, Urine, Catheter [[id number]] Collected: 05/29/23 1221    Specimen: Urine, Catheter Updated: 05/29/23 1301    High Sensitivity Troponin T 2Hr [[id number]]  (Abnormal) Collected: 05/29/23 1232    Specimen: Blood Updated: 05/29/23 1258     HS Troponin T 17 ng/L      Troponin T Delta -9 ng/L     Narrative:      High Sensitive Troponin T Reference Range:  <10.0 ng/L- Negative Female for AMI  <15.0 ng/L- Negative Male for AMI  >=10 - Abnormal Female indicating possible myocardial injury.  >=15 - Abnormal Male indicating possible myocardial injury.   Clinicians would have to utilize clinical acumen, EKG, Troponin, and serial changes to determine if it is an Acute Myocardial Infarction or myocardial injury due to an underlying chronic condition.         Blood Culture - Blood, Arm, Right [[id number]] Collected: 05/29/23 1232    Specimen: Blood from Arm, Right Updated: 05/29/23 1241    Blood Culture - Blood, Arm, Right [[id number]] Collected: 05/29/23 1215    Specimen: Blood from Arm, Right Updated: 05/29/23 1240    Lactic Acid, Plasma [[id number]]  (Abnormal) Collected: 05/29/23 1154    Specimen: Blood Updated: 05/29/23 1238     Lactate 2.1 mmol/L     Procalcitonin [[id number]]  (Abnormal) Collected: 05/29/23 1154    Specimen: Blood Updated: 05/29/23 1230     Procalcitonin 0.69 ng/mL     Narrative:      As a Marker for Sepsis (Non-Neonates):    1. <0.5 ng/mL represents a low risk of severe sepsis and/or septic shock.  2. >2 ng/mL represents a high risk of severe sepsis and/or septic shock.    As a Marker for Lower Respiratory Tract Infections that require antibiotic therapy:    PCT on Admission    Antibiotic Therapy       6-12 Hrs later    >0.5                Strongly Recommended  >0.25 - <0.5        Recommended   0.1 - 0.25          Discouraged              Remeasure/reassess PCT  <0.1                Strongly Discouraged     Remeasure/reassess PCT    As 28 day mortality risk marker: \"Change in Procalcitonin Result\" (>80% " or <=80%) if Day 0 (or Day 1) and Day 4 values are available. Refer to http://[email or web address]-pct-[email or web address]    Change in PCT <=80%  A decrease of PCT levels below or equal to 80% defines a positive change in PCT test result representing a higher risk for 28-day all-cause mortality of patients diagnosed with severe sepsis for septic shock.    Change in PCT >80%  A decrease of PCT levels of more than 80% defines a negative change in PCT result representing a lower risk for 28-day all-cause mortality of patients diagnosed with severe sepsis or septic shock.       Comprehensive Metabolic Panel [[id number]]  (Abnormal) Collected: 05/29/23 1154    Specimen: Blood Updated: 05/29/23 1224     Glucose 164 mg/dL      BUN 28 mg/dL      Creatinine 1.29 mg/dL      Sodium 143 mmol/L      Potassium 3.9 mmol/L      Chloride 107 mmol/L      CO2 24.0 mmol/L      Calcium 9.0 mg/dL      Total Protein 6.7 g/dL      Albumin 4.1 g/dL      ALT (SGPT) 11 U/L      AST (SGOT) 12 U/L      Alkaline Phosphatase 90 U/L      Total Bilirubin 0.8 mg/dL      Globulin 2.6 gm/dL      A/G Ratio 1.6 g/dL      BUN/Creatinine Ratio 21.7     Anion Gap 12.0 mmol/L      eGFR 59.3 mL/min/1.73     Narrative:      GFR Normal >60  Chronic Kidney Disease <60  Kidney Failure <15    The GFR formula is only valid for adults with stable renal function between ages 18 and 70.    Magnesium [[id number]]  (Normal) Collected: 05/29/23 1154    Specimen: Blood Updated: 05/29/23 1224     Magnesium 1.8 mg/dL     CK [[id number]]  (Normal) Collected: 05/29/23 1154    Specimen: Blood Updated: 05/29/23 1223     Creatine Kinase 46 U/L     High Sensitivity Troponin T [[id number]]  (Abnormal) Collected: 05/29/23 1154    Specimen: Blood Updated: 05/29/23 1220     HS Troponin T 26 ng/L     Narrative:      High Sensitive Troponin T Reference Range:  <10.0 ng/L- Negative Female for AMI  <15.0 ng/L- Negative Male for AMI  >=10 - Abnormal Female indicating possible myocardial injury.  >=15 -  "Abnormal Male indicating possible myocardial injury.   Clinicians would have to utilize clinical acumen, EKG, Troponin, and serial changes to determine if it is an Acute Myocardial Infarction or myocardial injury due to an underlying chronic condition.         D-dimer, Quantitative [[id number]]  (Abnormal) Collected: 05/29/23 1154    Specimen: Blood Updated: 05/29/23 1211     D-Dimer, Quantitative 3.60 MCGFEU/mL     Narrative:      According to the assay 's published package insert, a normal (<0.50 MCGFEU/mL) D-dimer result in conjunction with a non-high clinical probability assessment, excludes deep vein thrombosis (DVT) and pulmonary embolism (PE) with high sensitivity.    D-dimer values increase with age and this can make VTE exclusion of an older population difficult. To address this, the [employer], based on best available evidence and recent guidelines, recommends that clinicians use age-adjusted D-dimer thresholds in patients greater than 50 years of age with: a) a low probability of PE who do not meet all Pulmonary Embolism Rule Out Criteria, or b) in those with intermediate probability of PE.   The formula for an age-adjusted D-dimer cut-off is \"age/100\".  For example, a 60 year old patient would have an age-adjusted cut-off of 0.60 MCGFEU/mL and an 80 year old 0.80 MCGFEU/mL.    CBC & Differential [[id number]]  (Abnormal) Collected: 05/29/23 1154    Specimen: Blood Updated: 05/29/23 1208    Narrative:      The following orders were created for panel order CBC & Differential.  Procedure                               Abnormality         Status                     ---------                               -----------         ------                     CBC Auto Differential[[id number]]        Abnormal            Final result                 Please view results for these tests on the individual orders.    CBC Auto Differential [[id number]]  (Abnormal) Collected: 05/29/23 1154    " Specimen: Blood Updated: 05/29/23 1208     WBC 1.97 10*3/mm3      RBC 4.33 10*6/mm3      Hemoglobin 12.6 g/dL      Hematocrit 41.4 %      MCV 95.6 fL      MCH 29.1 pg      MCHC 30.4 g/dL      RDW 12.4 %      RDW-SD 43.6 fl      MPV 10.3 fL      Platelets 144 10*3/mm3      Neutrophil % 90.4 %      Lymphocyte % 7.1 %      Monocyte % 0.5 %      Eosinophil % 1.0 %      Basophil % 0.5 %      Immature Grans % 0.5 %      Neutrophils, Absolute 1.78 10*3/mm3      Lymphocytes, Absolute 0.14 10*3/mm3      Monocytes, Absolute 0.01 10*3/mm3      Eosinophils, Absolute 0.02 10*3/mm3      Basophils, Absolute 0.01 10*3/mm3      Immature Grans, Absolute 0.01 10*3/mm3      nRBC 0.0 /100 WBC           Imaging Results (Last 24 Hours)       Procedure Component Value Units Date/Time    CT Abdomen Pelvis With Contrast [[id number]] Collected: 05/29/23 1434     Updated: 05/29/23 1443    Narrative:      CT ABDOMEN PELVIS W CONTRAST- 5/29/2023 2:17 PM CDT     HISTORY: Lower abdominal pain       COMPARISON: None.      DOSE LENGTH PRODUCT: 906 mGy cm. Automated exposure control was also  utilized to decrease patient radiation dose.     TECHNIQUE: Following the intravenous administration of contrast, helical  CT tomographic images of the abdomen and pelvis were acquired.  Multiplanar reformatted images were provided for review.      FINDINGS:      LIVER: No suspicious liver lesion. The portal veins are patent..      BILIARY SYSTEM: Cholelithiasis. The gallbladder is nondistended. No  intrahepatic or extrahepatic ductal dilatation.      PANCREAS: No focal pancreatic lesion.      SPLEEN: Unremarkable.      KIDNEYS AND ADRENALS: Adrenal glands are unremarkable. 2 mm  nonobstructing left inferior pole renal stone. Striated nephrogram on  the right. No parenchymal abscess. Ureters are nondistended.     RETROPERITONEUM: No mass, lymphadenopathy or hemorrhage.      GI TRACT: The stomach is nondistended. Small bowel is nondilated. Mild  gaseous distention  of the transverse colon. No inflammatory changes  along the colon.      OTHER: There is no mesenteric mass, lymphadenopathy or fluid collection.  The abdominopelvic vasculature is patent. Bones are osteopenic. No acute  bony abnormality seen.       PELVIS: No mass lesion, fluid collection or significant lymphadenopathy  is seen in the pelvis. 3 mm bladder stone. Mild to moderate bladder  distention.       Impression:      1. Heterogeneous right nephrogram which may reflect an uncomplicated  right pyelonephritis. There is no hydronephrosis.  2. Mild to moderate urinary bladder distention.  3. 3 mm bladder stone.  4. Cholelithiasis. No CT evidence for acute cholecystitis. Gallbladder  is nondistended and appears noninflamed.     This report was finalized on 05/29/2023 14:40 by Dr Mervin Shore, .    CT Angiogram Chest [[id number]] Collected: 05/29/23 1431     Updated: 05/29/23 1437    Narrative:      CT ANGIOGRAM CHEST- 5/29/2023 2:17 PM CDT      HISTORY: Shortness of breath      COMPARISON: None.      DOSE LENGTH PRODUCT: 247 mGy cm. Automated exposure control was also  utilized to decrease patient radiation dose.     TECHNIQUE: Helical tomographic images of the chest were obtained after  the administration of intravenous contrast following angiogram protocol.  Additionally, 3D and multiplanar reformatted images were provided.        FINDINGS:    Pulmonary arteries: There is adequate enhancement of the pulmonary  arteries to evaluate for central and segmental pulmonary emboli. There  are no filling defects within the main, lobar, segmental or visualized  subsegmental pulmonary arteries. The pulmonary arteries are within  normal limits for size.      Aorta and great vessels: Thoracic aorta is normal in caliber. No  dissection identified. No flow-limiting stenosis identified at the great  vessel origins.     Visualized neck base: The imaged portion of the base of the neck appears  unremarkable.      Lungs: The lungs are  clear. There is no mass, worrisome nodule, or  consolidation. No pleural effusion is seen. Airways are clear.      Heart: The heart is normal in size. There is no pericardial effusion.     Mediastinum and lymph nodes: No suspicious hilar or mediastinal  adenopathy..     Skeletal and soft tissues: Chest wall soft tissues are unremarkable. No  acute bony abnormality. Thoracic spine degenerative change..        Impression:      1. No evidence of pulmonary embolus or other acute cardiopulmonary  process.        This report was finalized on 05/29/2023 14:34 by Dr Mervin Shore, .    XR Chest 1 View [[id number]] Collected: 05/29/23 1218     Updated: 05/29/23 1221    Narrative:      Frontal upright radiograph of the chest 5/29/2023 12:10 PM CDT     HISTORY: Shortness of breath     COMPARISON: Chest exam dated 01/07/2019.     FINDINGS:   The lungs are clear. The cardiomediastinal silhouette and pulmonary  vascularity are within normal limits.      The osseous structures and surrounding soft tissues demonstrate no acute  abnormality.       Impression:      1. No radiographic evidence of acute cardiopulmonary process.        This report was finalized on 05/29/2023 12:18 by Dr Mervin Shore, .          I have personally reviewed and interpreted the radiology studies and ECG obtained at time of admission.     Assessment / Plan   Assessment:   Active Hospital Problems    Diagnosis     **New onset a-fib     Acute UTI (urinary tract infection)     Gross hematuria     Obstructive sleep apnea     Type 2 diabetes mellitus without complication, without long-term current use of insulin     Familial hypercholesterolemia     Essential (primary) hypertension        Treatment Plan  The patient will be admitted to my service here at [facility].   Continue Cardizem drip  Supplemental O2 as needed  Consult Dr. Rodas urology for gross hematuria, penile pain, bladder pain  Consult cardiology re: new onset A-fib with rapid  rate.  Concern for use of anticoagulation in patient with gross hematuria we will ask cardiology their opinion on what we should use.  Continue home meds.  Continue Rocephin started in the emergency room.    Lab in a.m. to include a CMP and a CBC.  Monitor for fluid overload.        Medical Decision Making  Number and Complexity of problems:   Acute UTI moderate complexity  Gross hematuria moderate complexity  New onset A-fib high complexity  Obstructive sleep apnea moderate complexity  Type 2 diabetes moderate complexity  Essential hypertension moderate complexity    Differential Diagnosis: None    Conditions and Status        Condition is worsening.     MDM Data  External documents reviewed: Care everywhere reviewed  Cardiac tracing (EKG, telemetry) interpretation: EKG reviewed shows atrial fibrillation with rapid ventricular response.  Monitor in room continues to show A-fib with improved ventricular rate.  Radiology interpretation: Chest obtained in the ER reviewed  Labs reviewed: Reviewed  Any tests that were considered but not ordered: None     Decision rules/scores evaluated (example [id number], Wells, etc): None     Discussed with: Patient and wife     Care Planning  Shared decision making: Patient and wife  Code status and discussions: Full    Disposition  Social Determinants of Health that impact treatment or disposition: None  Estimated length of stay is 2 to 5 days.     I confirmed that the patient's advanced care plan is present, code status is documented, and a surrogate decision maker is listed in the patient's medical record.     The patient's surrogate decision maker is wife.     The patient was seen and examined by me on 5/29/2023 at 1600.    Electronically signed by Suzanna Clemons, 05/29/23, 16:54 CDT.

## 2023-05-29 NOTE — ED NOTES
Receiving nurse in CCU is currently performing pt care and is unable to receive report. CCU will call back when ready to receive report.

## 2023-05-29 NOTE — ED PROVIDER NOTES
Subjective   History of Present Illness  This is a 72-year-old male has had episode of chills.  Patient states that he had this episode lasted about 2 hours.  Patient was seen here for urinary symptoms.  Patient was sent home on a Law catheter.  Patient has a history of BPH.  Patient states that he is noted some discharge around his penis and had some pain at the head of the penis.  Patient has no testicular pain.  Patient has no swelling.  Patient has no nausea vomiting.  Patient has no fevers.  Patient states that he did have an episode of shortness of breath as well this morning.  Patient denies any other symptoms at this time.    Review of Systems   Constitutional:  Positive for chills.   Respiratory:  Positive for shortness of breath.    Genitourinary:  Positive for penile discharge and penile pain.   All other systems reviewed and are negative.    Past Medical History:   Diagnosis Date    Arthritis     Benign prostatic hyperplasia 2/1/12    Diabetes mellitus     Elevated cholesterol     Enlarged prostate     Erectile dysfunction 2/1/16    Hypertension     Rotator cuff disorder, right     Sleep apnea        Allergies   Allergen Reactions    Percocet [Oxycodone-Acetaminophen] Nausea Only       Past Surgical History:   Procedure Laterality Date    COLONOSCOPY  06/02/2016    polyp removed from 95 cm.a long [id number] colon not allowing mitchell safr colonoscopy    COLONOSCOPY N/A 06/08/2021    Procedure: COLONOSCOPY WITH ANESTHESIA;  Surgeon: Rick Dixon DO;  Location: [facility] ENDOSCOPY;  Service: Gastroenterology;  Laterality: N/A;  pre op: hx polyps  post op:normal  PCP: Feliciano Kinsey MD    CYSTOSCOPY TRANSURETHRAL RESECTION OF PROSTATE N/A 01/28/2022    Procedure: TRANSURETHRAL RESECTION OF PROSTATE;  Surgeon: Camron Bowden MD;  Location: [facility] OR;  Service: Urology;  Laterality: N/A;    HERNIA REPAIR      PROSTATE SURGERY  2/1/22    SHOULDER ARTHROSCOPY W/ ROTATOR CUFF REPAIR Right  2020    Procedure: RIGHT SHOULDER  ROTATOR CUFF REPAIR, SUBACROMIAL DECOMPRESSION;  Surgeon: Alphonso Lundberg MD;  Location: [facility] OR;  Service: Orthopedics;  Laterality: Right;    VARICOCELE EXCISION  2/15/82    VARICOSE VEIN SURGERY         Family History   Problem Relation Age of Onset    No Known Problems Mother     Diabetes Father         Type 2 diabetic; hes 91    Cancer Maternal Grandfather          in  of prostrate cancer; he was 81    Cancer Paternal Grandmother          of lung cancer ; he was 67    Diabetes Paternal Grandfather           of pneumonia; was 70; type 2 diabetes    Cancer Paternal Uncle         He  of leukemia; ; he was 49    Colon cancer Neg Hx     Colon polyps Neg Hx     Esophageal cancer Neg Hx        Social History     Socioeconomic History    Marital status:    Tobacco Use    Smoking status: Former     Packs/day: 1.00     Years: 40.00     Pack years: 40.00     Types: Cigarettes     Start date: 1967     Quit date: 12/15/2015     Years since quittin.4    Smokeless tobacco: Never    Tobacco comments:     Never again   Vaping Use    Vaping Use: Never used   Substance and Sexual Activity    Alcohol use: No    Drug use: Never    Sexual activity: Yes     Comment: My wife; I’m            Objective   Physical Exam  Vitals and nursing note reviewed. Exam conducted with a chaperone present.   Constitutional:       Appearance: Normal appearance.   HENT:      Head: Normocephalic and atraumatic.   Cardiovascular:      Rate and Rhythm: Regular rhythm. Tachycardia present.      Pulses: Normal pulses.      Heart sounds: Normal heart sounds.   Pulmonary:      Effort: Pulmonary effort is normal.      Breath sounds: Normal breath sounds.   Skin:     General: Skin is warm and dry.   Neurological:      General: No focal deficit present.      Mental Status: He is alert and oriented to person, place, and time.   Psychiatric:         Mood and  "Affect: Mood normal.         Behavior: Behavior normal.       Procedures           ED Course  ED Course as of 05/29/23 1535   Mon May 29, 2023   1204 EKG rate 133  Atrial fibrillation with RVR [RP]   1532 [id number] Score for Atrial Fibrillation Stroke Risk - MDCalc  Calculated on May 29 2023 4:32 PM  3 points -> Stroke risk was 3.2% per year in >90,000 patients (the [place] Atrial Fibrillation Cohort Study) and 4.6% risk of stroke/TIA/systemic embolism. One recommendation suggests a 0 score for men or 1 score for women (no clinical risk factors) is \"low\" risk and may not require anticoagulation; a 1 score for men or 2 score for women is \"low-moderate\" risk and should consider antiplatelet or anticoagulation; and a score =2 for men or =3 for women is \"moderate-high\" risk and should otherwise be an anticoagulation candidate. [RP]      ED Course User Index  [RP] Ru Hunt MD                                         Lab Results (last 24 hours)       Procedure Component Value Units Date/Time    CBC & Differential [[id number]]  (Abnormal) Collected: 05/29/23 1154    Specimen: Blood Updated: 05/29/23 1208    Narrative:      The following orders were created for panel order CBC & Differential.  Procedure                               Abnormality         Status                     ---------                               -----------         ------                     CBC Auto Differential[[id number]]        Abnormal            Final result                 Please view results for these tests on the individual orders.    Comprehensive Metabolic Panel [[id number]]  (Abnormal) Collected: 05/29/23 1154    Specimen: Blood Updated: 05/29/23 1224     Glucose 164 mg/dL      BUN 28 mg/dL      Creatinine 1.29 mg/dL      Sodium 143 mmol/L      Potassium 3.9 mmol/L      Chloride 107 mmol/L      CO2 24.0 mmol/L      Calcium 9.0 mg/dL      Total Protein 6.7 g/dL      Albumin 4.1 g/dL      ALT (SGPT) 11 U/L      AST (SGOT) 12 U/L      " "Alkaline Phosphatase 90 U/L      Total Bilirubin 0.8 mg/dL      Globulin 2.6 gm/dL      A/G Ratio 1.6 g/dL      BUN/Creatinine Ratio 21.7     Anion Gap 12.0 mmol/L      eGFR 59.3 mL/min/1.73     Narrative:      GFR Normal >60  Chronic Kidney Disease <60  Kidney Failure <15    The GFR formula is only valid for adults with stable renal function between ages 18 and 70.    Lactic Acid, Plasma [[id number]]  (Abnormal) Collected: 05/29/23 1154    Specimen: Blood Updated: 05/29/23 1238     Lactate 2.1 mmol/L     Procalcitonin [[id number]]  (Abnormal) Collected: 05/29/23 1154    Specimen: Blood Updated: 05/29/23 1230     Procalcitonin 0.69 ng/mL     Narrative:      As a Marker for Sepsis (Non-Neonates):    1. <0.5 ng/mL represents a low risk of severe sepsis and/or septic shock.  2. >2 ng/mL represents a high risk of severe sepsis and/or septic shock.    As a Marker for Lower Respiratory Tract Infections that require antibiotic therapy:    PCT on Admission    Antibiotic Therapy       6-12 Hrs later    >0.5                Strongly Recommended  >0.25 - <0.5        Recommended   0.1 - 0.25          Discouraged              Remeasure/reassess PCT  <0.1                Strongly Discouraged     Remeasure/reassess PCT    As 28 day mortality risk marker: \"Change in Procalcitonin Result\" (>80% or <=80%) if Day 0 (or Day 1) and Day 4 values are available. Refer to http://www.[employer]-pct-calculator.com    Change in PCT <=80%  A decrease of PCT levels below or equal to 80% defines a positive change in PCT test result representing a higher risk for 28-day all-cause mortality of patients diagnosed with severe sepsis for septic shock.    Change in PCT >80%  A decrease of PCT levels of more than 80% defines a negative change in PCT result representing a lower risk for 28-day all-cause mortality of patients diagnosed with severe sepsis or septic shock.       D-dimer, Quantitative [[id number]]  (Abnormal) Collected: 05/29/23 1154    Specimen: " "Blood Updated: 05/29/23 1211     D-Dimer, Quantitative 3.60 MCGFEU/mL     Narrative:      According to the assay 's published package insert, a normal (<0.50 MCGFEU/mL) D-dimer result in conjunction with a non-high clinical probability assessment, excludes deep vein thrombosis (DVT) and pulmonary embolism (PE) with high sensitivity.    D-dimer values increase with age and this can make VTE exclusion of an older population difficult. To address this, the [employer], based on best available evidence and recent guidelines, recommends that clinicians use age-adjusted D-dimer thresholds in patients greater than 50 years of age with: a) a low probability of PE who do not meet all Pulmonary Embolism Rule Out Criteria, or b) in those with intermediate probability of PE.   The formula for an age-adjusted D-dimer cut-off is \"age/100\".  For example, a 60 year old patient would have an age-adjusted cut-off of 0.60 MCGFEU/mL and an 80 year old 0.80 MCGFEU/mL.    High Sensitivity Troponin T [[id number]]  (Abnormal) Collected: 05/29/23 1154    Specimen: Blood Updated: 05/29/23 1220     HS Troponin T 26 ng/L     Narrative:      High Sensitive Troponin T Reference Range:  <10.0 ng/L- Negative Female for AMI  <15.0 ng/L- Negative Male for AMI  >=10 - Abnormal Female indicating possible myocardial injury.  >=15 - Abnormal Male indicating possible myocardial injury.   Clinicians would have to utilize clinical acumen, EKG, Troponin, and serial changes to determine if it is an Acute Myocardial Infarction or myocardial injury due to an underlying chronic condition.         CK [[id number]]  (Normal) Collected: 05/29/23 1154    Specimen: Blood Updated: 05/29/23 1223     Creatine Kinase 46 U/L     Magnesium [[id number]]  (Normal) Collected: 05/29/23 1154    Specimen: Blood Updated: 05/29/23 1224     Magnesium 1.8 mg/dL     CBC Auto Differential [[id number]]  (Abnormal) Collected: 05/29/23 1154    Specimen: Blood " Updated: 05/29/23 1208     WBC 1.97 10*3/mm3      RBC 4.33 10*6/mm3      Hemoglobin 12.6 g/dL      Hematocrit 41.4 %      MCV 95.6 fL      MCH 29.1 pg      MCHC 30.4 g/dL      RDW 12.4 %      RDW-SD 43.6 fl      MPV 10.3 fL      Platelets 144 10*3/mm3      Neutrophil % 90.4 %      Lymphocyte % 7.1 %      Monocyte % 0.5 %      Eosinophil % 1.0 %      Basophil % 0.5 %      Immature Grans % 0.5 %      Neutrophils, Absolute 1.78 10*3/mm3      Lymphocytes, Absolute 0.14 10*3/mm3      Monocytes, Absolute 0.01 10*3/mm3      Eosinophils, Absolute 0.02 10*3/mm3      Basophils, Absolute 0.01 10*3/mm3      Immature Grans, Absolute 0.01 10*3/mm3      nRBC 0.0 /100 WBC     Blood Culture - Blood, Arm, Right [[id number]] Collected: 05/29/23 1215    Specimen: Blood from Arm, Right Updated: 05/29/23 1240    Urinalysis With Culture If Indicated - Urine, Catheter [[id number]]  (Abnormal) Collected: 05/29/23 1221    Specimen: Urine, Catheter Updated: 05/29/23 1301     Color, UA Red     Appearance, UA Turbid     pH, UA 6.0     Specific Gravity, UA 1.013     Glucose, UA Negative     Ketones, UA Negative     Bilirubin, UA Large (3+)     Blood, UA Moderate (2+)     Protein,  mg/dL (2+)     Leuk Esterase, UA Large (3+)     Nitrite, UA Positive     Urobilinogen, UA 0.2 E.U./dL    Narrative:      In absence of clinical symptoms, the presence of pyuria, bacteria, and/or nitrites on the urinalysis result does not correlate with infection.    Urinalysis, Microscopic Only - Urine, Catheter [[id number]]  (Abnormal) Collected: 05/29/23 1221    Specimen: Urine, Catheter Updated: 05/29/23 1301     RBC, UA Too Numerous to Count /HPF      WBC, UA 31-50 /HPF      Bacteria, UA 1+ /HPF      Squamous Epithelial Cells, UA 0-2 /HPF      Hyaline Casts, UA 0-2 /LPF      Methodology Manual Light Microscopy    Narrative:      Microscopic examination performed on non-centrifuged sample due to increased RBCs.     Urine Culture - Urine, Urine, Catheter  [[id number]] Collected: 05/29/23 1221    Specimen: Urine, Catheter Updated: 05/29/23 1301    High Sensitivity Troponin T 2Hr [[id number]]  (Abnormal) Collected: 05/29/23 1232    Specimen: Blood Updated: 05/29/23 1258     HS Troponin T 17 ng/L      Troponin T Delta -9 ng/L     Narrative:      High Sensitive Troponin T Reference Range:  <10.0 ng/L- Negative Female for AMI  <15.0 ng/L- Negative Male for AMI  >=10 - Abnormal Female indicating possible myocardial injury.  >=15 - Abnormal Male indicating possible myocardial injury.   Clinicians would have to utilize clinical acumen, EKG, Troponin, and serial changes to determine if it is an Acute Myocardial Infarction or myocardial injury due to an underlying chronic condition.         Blood Culture - Blood, Arm, Right [[id number]] Collected: 05/29/23 1232    Specimen: Blood from Arm, Right Updated: 05/29/23 1241    STAT Lactic Acid, Reflex [[id number]]  (Normal) Collected: 05/29/23 1323    Specimen: Blood Updated: 05/29/23 1350     Lactate 1.6 mmol/L             CT Angiogram Chest   Final Result   1. No evidence of pulmonary embolus or other acute cardiopulmonary   process.           This report was finalized on 05/29/2023 14:34 by Dr Mervin Shore, .      CT Abdomen Pelvis With Contrast   Final Result   1. Heterogeneous right nephrogram which may reflect an uncomplicated   right pyelonephritis. There is no hydronephrosis.   2. Mild to moderate urinary bladder distention.   3. 3 mm bladder stone.   4. Cholelithiasis. No CT evidence for acute cholecystitis. Gallbladder   is nondistended and appears noninflamed.       This report was finalized on 05/29/2023 14:40 by Dr Mervin Shore, .      XR Chest 1 View   Final Result   1. No radiographic evidence of acute cardiopulmonary process.           This report was finalized on 05/29/2023 12:18 by Dr Mervin Shore, .          Medications   sodium chloride 0.9 % flush 10 mL (has no administration in time range)   dilTIAZem (CARDIZEM)  125 mg in 125 mL NS infusion (10 mg/hr Intravenous Rate/Dose Change 5/29/23 1353)   cefTRIAXone (ROCEPHIN) 1 g in sodium chloride 0.9 % 100 mL IVPB (has no administration in time range)   sodium chloride 0.9 % bolus 1,000 mL (0 mL Intravenous Stopped 5/29/23 1323)   dilTIAZem (CARDIZEM) injection 20 mg (20 mg Intravenous Given 5/29/23 1207)   dilTIAZem (CARDIZEM) injection 20 mg (20 mg Intravenous Given 5/29/23 1239)   iopamidol (ISOVUE-370) 76 % injection 100 mL (100 mL Intravenous Given 5/29/23 1428)       Medical Decision Making  72-year-old male came into the emergency room with chills, shortness of breath.  Patient did have Law catheter in place for urinary retention.  Patient was found to have a urinary tract infection.  Patient was found to be septic as well.  Patient was also found to be in atrial fibrillation with RVR on his EKG.  Patient was given Cardizem IV 20 mg boluses x2.  Patient was then started on Cardizem IV drip.  Patient also was given IV Rocephin here in the emergency room.  I spoke with the hospitalist on-call Dr. Johnson who has accepted the patient under his services Dr. Lopez.    Problems Addressed:  Atrial fibrillation with RVR: complicated acute illness or injury  Sepsis, due to unspecified organism, unspecified whether acute organ dysfunction present: complicated acute illness or injury  Urinary tract infection with hematuria, site unspecified: complicated acute illness or injury    Amount and/or Complexity of Data Reviewed  Labs: ordered. Decision-making details documented in ED Course.  Radiology: ordered. Decision-making details documented in ED Course.  ECG/medicine tests: ordered. Decision-making details documented in ED Course.    Risk  Prescription drug management.  Decision regarding hospitalization.        Final diagnoses:   Sepsis, due to unspecified organism, unspecified whether acute organ dysfunction present   Urinary tract infection with hematuria, site unspecified   Atrial  fibrillation with RVR       ED Disposition  ED Disposition       ED Disposition   Decision to Admit    Condition   --    Comment   Level of Care: Critical Care [6]   Diagnosis: Sepsis, due to unspecified organism, unspecified whether acute organ dysfunction present [[id number]]   Admitting Physician: LITO RICK [[id number]]   Attending Physician: LITO RICK [[id number]]   Certification: I Certify That Inpatient Hospital Services Are Medically Necessary For Greater Than 2 Midnights                 No follow-up provider specified.       Medication List        Changed      tamsulosin 0.4 MG capsule 24 hr capsule  Commonly known as: FLOMAX  Take 1 capsule by mouth Daily.  What changed: how much to take                 Ru Hunt MD  05/29/23 0652

## 2023-05-30 ENCOUNTER — APPOINTMENT (OUTPATIENT)
Dept: CARDIOLOGY | Facility: HOSPITAL | Age: 73
DRG: 690 | End: 2023-05-30
Payer: MEDICARE

## 2023-05-30 LAB
ALBUMIN SERPL-MCNC: 3.6 G/DL (ref 3.5–5.2)
ALBUMIN/GLOB SERPL: 1.4 G/DL
ALP SERPL-CCNC: 64 U/L (ref 39–117)
ALT SERPL W P-5'-P-CCNC: 14 U/L (ref 1–41)
ANION GAP SERPL CALCULATED.3IONS-SCNC: 12 MMOL/L (ref 5–15)
AST SERPL-CCNC: 16 U/L (ref 1–40)
BASOPHILS # BLD MANUAL: 0.16 10*3/MM3 (ref 0–0.2)
BASOPHILS NFR BLD MANUAL: 1 % (ref 0–1.5)
BH CV ECHO MEAS - AO MAX PG: 4.4 MMHG
BH CV ECHO MEAS - AO MEAN PG: 2 MMHG
BH CV ECHO MEAS - AO ROOT DIAM: 3.7 CM
BH CV ECHO MEAS - AO V2 MAX: 105 CM/SEC
BH CV ECHO MEAS - AO V2 VTI: 19.2 CM
BH CV ECHO MEAS - AVA(I,D): 4 CM2
BH CV ECHO MEAS - EDV(CUBED): 66.4 ML
BH CV ECHO MEAS - EDV(MOD-SP4): 127 ML
BH CV ECHO MEAS - EF(MOD-SP4): 59.8 %
BH CV ECHO MEAS - ESV(CUBED): 24.5 ML
BH CV ECHO MEAS - ESV(MOD-SP4): 51 ML
BH CV ECHO MEAS - FS: 28.3 %
BH CV ECHO MEAS - IVS/LVPW: 0.81 CM
BH CV ECHO MEAS - IVSD: 0.83 CM
BH CV ECHO MEAS - LA DIMENSION: 3.3 CM
BH CV ECHO MEAS - LAT PEAK E' VEL: 9.4 CM/SEC
BH CV ECHO MEAS - LV DIASTOLIC VOL/BSA (35-75): 49.6 CM2
BH CV ECHO MEAS - LV MASS(C)D: 116 GRAMS
BH CV ECHO MEAS - LV MAX PG: 5 MMHG
BH CV ECHO MEAS - LV MEAN PG: 2 MMHG
BH CV ECHO MEAS - LV SYSTOLIC VOL/BSA (12-30): 19.9 CM2
BH CV ECHO MEAS - LV V1 MAX: 112 CM/SEC
BH CV ECHO MEAS - LV V1 VTI: 18.6 CM
BH CV ECHO MEAS - LVIDD: 4.1 CM
BH CV ECHO MEAS - LVIDS: 2.9 CM
BH CV ECHO MEAS - LVOT AREA: 4.2 CM2
BH CV ECHO MEAS - LVOT DIAM: 2.3 CM
BH CV ECHO MEAS - LVPWD: 1.02 CM
BH CV ECHO MEAS - MED PEAK E' VEL: 9.9 CM/SEC
BH CV ECHO MEAS - MV A MAX VEL: 52.5 CM/SEC
BH CV ECHO MEAS - MV DEC TIME: 0.22 MSEC
BH CV ECHO MEAS - MV E MAX VEL: 131 CM/SEC
BH CV ECHO MEAS - MV E/A: 2.5
BH CV ECHO MEAS - SI(MOD-SP4): 29.7 ML/M2
BH CV ECHO MEAS - SV(LVOT): 77.3 ML
BH CV ECHO MEAS - SV(MOD-SP4): 76 ML
BH CV ECHO MEAS - TR MAX PG: 5.1 MMHG
BH CV ECHO MEAS - TR MAX VEL: 113 CM/SEC
BH CV ECHO MEASUREMENTS AVERAGE E/E' RATIO: 13.58
BILIRUB SERPL-MCNC: 0.4 MG/DL (ref 0–1.2)
BUN SERPL-MCNC: 36 MG/DL (ref 8–23)
BUN/CREAT SERPL: 24 (ref 7–25)
CALCIUM SPEC-SCNC: 8.1 MG/DL (ref 8.6–10.5)
CHLORIDE SERPL-SCNC: 105 MMOL/L (ref 98–107)
CO2 SERPL-SCNC: 24 MMOL/L (ref 22–29)
CREAT SERPL-MCNC: 1.5 MG/DL (ref 0.76–1.27)
DEPRECATED RDW RBC AUTO: 46.5 FL (ref 37–54)
EGFRCR SERPLBLD CKD-EPI 2021: 49.5 ML/MIN/1.73
ERYTHROCYTE [DISTWIDTH] IN BLOOD BY AUTOMATED COUNT: 12.9 % (ref 12.3–15.4)
GLOBULIN UR ELPH-MCNC: 2.5 GM/DL
GLUCOSE BLDC GLUCOMTR-MCNC: 122 MG/DL (ref 70–130)
GLUCOSE BLDC GLUCOMTR-MCNC: 127 MG/DL (ref 70–130)
GLUCOSE BLDC GLUCOMTR-MCNC: 143 MG/DL (ref 70–130)
GLUCOSE BLDC GLUCOMTR-MCNC: 146 MG/DL (ref 70–130)
GLUCOSE BLDC GLUCOMTR-MCNC: 261 MG/DL (ref 70–130)
GLUCOSE SERPL-MCNC: 133 MG/DL (ref 65–99)
HCT VFR BLD AUTO: 36.7 % (ref 37.5–51)
HGB BLD-MCNC: 10.7 G/DL (ref 13–17.7)
LEFT ATRIUM VOLUME INDEX: 31.2 ML/M2
LEFT ATRIUM VOLUME: 79.8 ML
LYMPHOCYTES # BLD MANUAL: 0.62 10*3/MM3 (ref 0.7–3.1)
LYMPHOCYTES NFR BLD MANUAL: 1 % (ref 5–12)
MCH RBC QN AUTO: 29 PG (ref 26.6–33)
MCHC RBC AUTO-ENTMCNC: 29.2 G/DL (ref 31.5–35.7)
MCV RBC AUTO: 99.5 FL (ref 79–97)
MONOCYTES # BLD: 0.16 10*3/MM3 (ref 0.1–0.9)
NEUTROPHILS # BLD AUTO: 14.65 10*3/MM3 (ref 1.7–7)
NEUTROPHILS NFR BLD MANUAL: 62 % (ref 42.7–76)
NEUTS BAND NFR BLD MANUAL: 32 % (ref 0–5)
PLATELET # BLD AUTO: 131 10*3/MM3 (ref 140–450)
PMV BLD AUTO: 11.2 FL (ref 6–12)
POTASSIUM SERPL-SCNC: 4.3 MMOL/L (ref 3.5–5.2)
PROT SERPL-MCNC: 6.1 G/DL (ref 6–8.5)
RBC # BLD AUTO: 3.69 10*6/MM3 (ref 4.14–5.8)
RBC MORPH BLD: NORMAL
SMALL PLATELETS BLD QL SMEAR: ABNORMAL
SODIUM SERPL-SCNC: 141 MMOL/L (ref 136–145)
VARIANT LYMPHS NFR BLD MANUAL: 4 % (ref 19.6–45.3)
WBC MORPH BLD: NORMAL
WBC NRBC COR # BLD: 15.59 10*3/MM3 (ref 3.4–10.8)

## 2023-05-30 PROCEDURE — 82948 REAGENT STRIP/BLOOD GLUCOSE: CPT

## 2023-05-30 PROCEDURE — 93306 TTE W/DOPPLER COMPLETE: CPT | Performed by: INTERNAL MEDICINE

## 2023-05-30 PROCEDURE — 94799 UNLISTED PULMONARY SVC/PX: CPT

## 2023-05-30 PROCEDURE — 63710000001 INSULIN LISPRO (HUMAN) PER 5 UNITS: Performed by: UROLOGY

## 2023-05-30 PROCEDURE — 25510000001 PERFLUTREN 6.52 MG/ML SUSPENSION: Performed by: FAMILY MEDICINE

## 2023-05-30 PROCEDURE — 80053 COMPREHEN METABOLIC PANEL: CPT | Performed by: FAMILY MEDICINE

## 2023-05-30 PROCEDURE — 93010 ELECTROCARDIOGRAM REPORT: CPT | Performed by: INTERNAL MEDICINE

## 2023-05-30 PROCEDURE — 25010000002 CEFTRIAXONE PER 250 MG: Performed by: UROLOGY

## 2023-05-30 PROCEDURE — 93306 TTE W/DOPPLER COMPLETE: CPT

## 2023-05-30 PROCEDURE — 85025 COMPLETE CBC W/AUTO DIFF WBC: CPT | Performed by: FAMILY MEDICINE

## 2023-05-30 PROCEDURE — 99024 POSTOP FOLLOW-UP VISIT: CPT | Performed by: UROLOGY

## 2023-05-30 PROCEDURE — 99232 SBSQ HOSP IP/OBS MODERATE 35: CPT | Performed by: INTERNAL MEDICINE

## 2023-05-30 PROCEDURE — 85007 BL SMEAR W/DIFF WBC COUNT: CPT | Performed by: FAMILY MEDICINE

## 2023-05-30 PROCEDURE — 94760 N-INVAS EAR/PLS OXIMETRY 1: CPT

## 2023-05-30 RX ORDER — DILTIAZEM HYDROCHLORIDE 60 MG/1
60 TABLET, FILM COATED ORAL EVERY 6 HOURS SCHEDULED
Status: DISCONTINUED | OUTPATIENT
Start: 2023-05-30 | End: 2023-05-31 | Stop reason: HOSPADM

## 2023-05-30 RX ADMIN — Medication 10 ML: at 20:35

## 2023-05-30 RX ADMIN — HYDROCODONE BITARTRATE AND ACETAMINOPHEN 1 TABLET: 5; 325 TABLET ORAL at 05:25

## 2023-05-30 RX ADMIN — INSULIN LISPRO 4 UNITS: 100 INJECTION, SOLUTION INTRAVENOUS; SUBCUTANEOUS at 00:32

## 2023-05-30 RX ADMIN — HYDROCODONE BITARTRATE AND ACETAMINOPHEN 1 TABLET: 5; 325 TABLET ORAL at 22:30

## 2023-05-30 RX ADMIN — ATORVASTATIN CALCIUM 10 MG: 10 TABLET, FILM COATED ORAL at 00:32

## 2023-05-30 RX ADMIN — CEFTRIAXONE 1 G: 1 INJECTION, POWDER, FOR SOLUTION INTRAMUSCULAR; INTRAVENOUS at 17:45

## 2023-05-30 RX ADMIN — DILTIAZEM HYDROCHLORIDE 60 MG: 60 TABLET, FILM COATED ORAL at 17:46

## 2023-05-30 RX ADMIN — DILTIAZEM HYDROCHLORIDE 5 MG/HR: 5 INJECTION INTRAVENOUS at 03:58

## 2023-05-30 RX ADMIN — PERFLUTREN 1.17 MG: 6.52 INJECTION, SUSPENSION INTRAVENOUS at 09:04

## 2023-05-30 RX ADMIN — Medication 10 ML: at 08:43

## 2023-05-30 RX ADMIN — ATORVASTATIN CALCIUM 10 MG: 10 TABLET, FILM COATED ORAL at 20:35

## 2023-05-30 RX ADMIN — HYDROCODONE BITARTRATE AND ACETAMINOPHEN 1 TABLET: 5; 325 TABLET ORAL at 13:42

## 2023-05-30 NOTE — PROGRESS NOTES
" LOS: 1 day   Patient Care Team:  Feliciano Kinsey MD as PCP - General  Feliciano Kinsey MD as PCP - Family Medicine  Camron Bowden MD as Consulting Physician (Urology)    Chief Complaint:  Difficulty voiding, bladder neck contracture    Subjective     Interval History:     Patient Reports: Feeling \"much better\" after \"[place] fixing me last night\"  Patient Denies:  Subjective retention, fever  History taken from: patient chart RN    Would like to transfer to the floor    Review of Systems  Pertinent items are noted in HPI, all other systems reviewed and negative     Objective     Vital Signs  Temp:  [97.7 °F (36.5 °C)-100 °F (37.8 °C)] 99.3 °F (37.4 °C)  Heart Rate:  [] 72  Resp:  [18-27] 20  BP: ()/(46-97) 80/63    Physical Exam:  Stable clinical appearance.  Law in place, minimal CBI running. Clear yellow urine.     Data Review:       I have reviewed the following data:    Comprehensive Metabolic Panel (05/29/2023 11:54)    CBC & Differential (05/29/2023 11:54)    Urinalysis, Microscopic Only - Urine, Catheter (05/29/2023 12:21)    Urinalysis With Culture If Indicated - Urine, Catheter (05/29/2023 12:21)    Medication Review:     Current Facility-Administered Medications:     atorvastatin (LIPITOR) tablet 10 mg, 10 mg, Oral, Nightly, Camron Bowden MD, 10 mg at 05/30/23 0032    cefTRIAXone (ROCEPHIN) 1 g in sodium chloride 0.9 % 100 mL IVPB, 1 g, Intravenous, Q24H, Camron Bowden MD    dextrose (D50W) (25 g/50 mL) IV injection 25 g, 25 g, Intravenous, Q15 Min PRN, Camron Bowden MD    dextrose (GLUTOSE) oral gel 15 g, 15 g, Oral, Q15 Min PRN, Camron Bowden MD    dilTIAZem (CARDIZEM) 125 mg in 125 mL NS infusion, 5-15 mg/hr, Intravenous, Titrated, Camron Bowden MD, Stopped at 05/30/23 0730    dilTIAZem (CARDIZEM) tablet 60 mg, 60 mg, Oral, Q6H, Suzanna Clemons    glucagon (GLUCAGEN) injection 1 mg, 1 mg, Intramuscular, Q15 Min PRN, Camron Bowden MD    " HYDROcodone-acetaminophen (NORCO) 5-325 MG per tablet 1 tablet, 1 tablet, Oral, Q4H PRN, Virgen Tijerina DO, 1 tablet at 05/30/23 0525    Insulin Lispro (humaLOG) injection 2-7 Units, 2-7 Units, Subcutaneous, 4x Daily AC & at Bedtime, Camron Bowden MD, 4 Units at 05/30/23 0032    lisinopril (PRINIVIL,ZESTRIL) tablet 10 mg, 10 mg, Oral, Daily, Camron Bowden MD, 10 mg at 05/29/23 1828    Magnesium Cardiology Dose Replacement - Follow Nurse / BPA Driven Protocol, , Does not apply, Kal SCHWARTZ Donald L, MD    Potassium Replacement - Follow Nurse / BPA Driven Protocol, , Does not apply, Kal SCHWARTZ Donald L, MD    [COMPLETED] Insert Peripheral IV, , , Once **AND** sodium chloride 0.9 % flush 10 mL, 10 mL, Intravenous, PRN, Camron Bowden MD    sodium chloride 0.9 % flush 10 mL, 10 mL, Intravenous, Q12H, Camron Bowden MD    sodium chloride 0.9 % flush 10 mL, 10 mL, Intravenous, PRN, Camron Bowden MD    sodium chloride 0.9 % infusion 40 mL, 40 mL, Intravenous, PRN, Camron Bowden MD    tamsulosin (FLOMAX) 24 hr capsule 0.8 mg, 0.8 mg, Oral, Daily, Camron Bowden MD, 0.8 mg at 05/29/23 1828    Assessment and Plan:    Urinary retention/bladder neck contracture: S/P dilation yesterday. Keep Law for today. Consider voiding trial tomorrow. Titrate CBI to off today.    Leukopenia: Monitor    Elevation of creatinine: Mild. Monitor.    UA concerning for UTI: Continue broad spectrum antibiotics pending urine culture results.     URO DISPO: I will continue to follow this patient.    I discussed the patient's findings and my recommendations with patient and nursing staff    (Please note that portions of this note were completed with a voice recognition program.)    Pranav Pruitt MD  05/30/23  08:05 CDT    Time: Time spent: 20 minutes spent performing evaluation and management, chart review, and discussion with patient, > 50% of time spent in face-to-face encounter

## 2023-05-30 NOTE — PLAN OF CARE
Goal Outcome Evaluation:  Plan of Care Reviewed With: patient, spouse      ORIENTED TO ROOM AND SURROUNDINGS. Received from CCU. Call light in reach.

## 2023-05-30 NOTE — ANESTHESIA PREPROCEDURE EVALUATION
Anesthesia Evaluation     Patient summary reviewed   no history of anesthetic complications:   NPO Solid Status: > 8 hours  NPO Liquid Status: > 6 hours           Airway   Mallampati: II  TM distance: >3 FB  No difficulty expected  Dental - normal exam     Pulmonary    (+) ,sleep apnea on CPAP  (-) COPD, not a smoker, no home oxygen    ROS comment: On 2L NC during this admission due to shortness of breath with afib  Cardiovascular   Exercise tolerance: good (4-7 METS)    (+) hypertensiondysrhythmias Atrial Fib, hyperlipidemia  (-) pacemaker, valvular problems/murmurs, past MI, cardiac stents, CABG    ROS comment: New onset afib in ER. Rate controlled with diltiazem gtt    Neuro/Psych- negative ROS  (-) seizures, CVA  GI/Hepatic/Renal/Endo    (+) obesity, renal disease CRI, diabetes mellitus  (-) liver disease    Musculoskeletal     Abdominal   (+) obese   Substance History      OB/GYN          Other        ROS/Med Hx Other: Discussed increased risk with new onset afib. Emergent procedure that must go to OR                  Anesthesia Plan    ASA 3 - emergent     general   Rapid sequence  intravenous induction     Anesthetic plan, risks, benefits, and alternatives have been provided, discussed and informed consent has been obtained with: patient.

## 2023-05-30 NOTE — NURSING NOTE
Received from CCU. VSS. F/C to BSD with clear yellow urine. Oriented to room and surroundings. Call light in reach.

## 2023-05-30 NOTE — OP NOTE
"Operative Summary    Humberto Auguste Jr.  Date of Procedure: 5/29/2023    Pre-op Diagnosis:   Gross hematuria [R31.0]    Post-op Diagnosis:     Post-Op Diagnosis Codes:     * Gross hematuria [R31.0]  Bladder neck contracture    Procedure/CPT® Codes:      Procedure(s):  CYSTOSCOPY WITH CLOT EVACUATION  BLADDER NECK CONTRACTURE INCISION AND DILATION  REMOVAL OF BLADDER CALCULUS    Surgeon(s):  Camron Bowden MD    Anesthesia: General    Staff:   Circulator: Alex Osman RN  Scrub Person: Daphne Roth; Jason Carvalho    Indications for procedure:  Urinary retention, inability to place Law catheter, and gross hematuria    Findings:   There was trauma to the bulb of the urethra from previous catheter.  There is a severe bladder neck contracture.  There is a small stone in the bladder.    Procedure details:  After appropriate anesthesia, positioning, prep and drape, timeout protocol was observed.     23 Sami cystoscope was inserted.  Immediately noted is the erythema of the bulbar urethra.  I do not see any evidence of urethral perforation.  There was a lot of suburothelial hemorrhage.  I did not see a false passage.    There is definite trauma to the membranous urethra.  The anterior portion did show a small urothelial tear.  Prostatic urethra is well resected.  He has a severe bladder neck contracture.    30 Sami dilating balloon used up to 16 janelle to dilate the bladder neck contracture.  After this I introduced the scope into the bladder.  I could identify a small stone.  Bladder is moderately trabeculated.  There were no diverticuli noted.  No urothelial lesions.  Minimal clot in bladder.    Switching over the resectoscope with a cutting loop I incised the bladder neck contracture ventrally \"6:00 \"down into what appeared to be healthier tissue after cutting through the scar.    At this point I placed a three-way Law catheter.    Estimated Blood Loss: Less than 30 mL    Specimens:              "   None      Drains: 22 Danish three-way Law catheter  Complications: none    Plan:  -We will continue catheter for 48 hours.  Would be appropriate to remove this on Wednesday, 5/31/2023  -Plan cystoscopy on him in 1 month to reassess the bladder neck contracture.  -Follow-up urine culture with antibiotics as appropriate        (Please note that portions of this note were completed with a voice recognition program.)  Camron Bowden MD     Date: 5/30/2023  Time: 08:44 CDT

## 2023-05-30 NOTE — PLAN OF CARE
Goal Outcome Evaluation:  Plan of Care Reviewed With: patient        Progress: no change  Outcome Evaluation: Patient was admitted from ED this afternoon with complaints of dysuria and hematuria. Yang catheter was removed in ED with reports of clots with urination, he was only able to urinate small amounts. Bladder scan in CCU was >999 and a three way yang catheter was attempted to be inserted with difficulty. We were unable to fully advance the catheter to be able to inflate the balloon. Dr. Bowden was notified and presented at bedside, he discussed going to OR for cystoscopy and placing a yang under anesthesia. Patient denies having pain except with spasms. He was also found to be in afib RVR in the ED, this is controlled with cardizem drip. Blood pressure is good. Temperature of 100F in CCU.

## 2023-05-30 NOTE — ANESTHESIA POSTPROCEDURE EVALUATION
"Patient: Humberto Auguste Jr.    Procedure Summary       Date: 05/29/23 Room / Location:  [facility] OR 01 /  [facility]    Anesthesia Start: 2024 Anesthesia Stop: 2116    Procedure: CYSTOSCOPY WITH CLOT EVACUATION (Bladder) Diagnosis:       Gross hematuria      (Gross hematuria [R31.0])    Surgeons: Camron Bowden MD Provider: Chandrakant Camacho CRNA    Anesthesia Type: general ASA Status: 3 - Emergent            Anesthesia Type: general    Vitals  Vitals Value Taken Time   /65 05/29/23 2150   Temp 98.2 °F (36.8 °C) 05/29/23 2150   Pulse 97 05/29/23 2151   Resp 20 05/29/23 2150   SpO2 95 % 05/29/23 2150   Vitals shown include unvalidated device data.        Post Anesthesia Care and Evaluation    Patient location during evaluation: PACU  Patient participation: complete - patient participated  Level of consciousness: awake and alert  Pain management: adequate    Airway patency: patent  Anesthetic complications: No anesthetic complications    Cardiovascular status: acceptable  Respiratory status: acceptable  Hydration status: acceptable    Comments: Blood pressure 109/65, pulse 96, temperature 98.2 °F (36.8 °C), resp. rate 20, height 190.5 cm (75\"), weight 131 kg (287 lb 12.8 oz), SpO2 95 %.    Pt discharged from PACU based on goyo score >8    "

## 2023-05-30 NOTE — NURSING NOTE
Pt transferred to the medical floor on oxygen and telemetry. No s/s resp distress or chest pain. CBI clamped off & plugged. Report given to Theresa SHARMA RN and bedside handoff to Blake HARMON

## 2023-05-30 NOTE — PROGRESS NOTES
[facility]  INPATIENT PROGRESS NOTE    Patient Name: Humberto Auguste Jr.  Date of Admission: 5/29/2023  Today's Date: 05/30/23  Length of Stay: 1  Primary Care Physician: Feliciano Kinsey MD    Subjective   Chief Complaint: Wants to move out to medical floor.  Wants to get up so he can have a shower and then have good bowel movement.  HPI   Patient feels much better this morning.  He states [place] took care of the problem.  He has no bladder or penile pain this morning no associated nausea or vomiting.  He is rested well overnight.  Per nursing his heart rate has been controlled overnight.  He is on 5 of Cardizem IV.  Blood pressure at bedside was in the 80s systolic.  Cardizem discontinued.  Heart rate in the 70s.  Remains in atrial fibrillation.    Despite she was able paise three-way Law last night.  Currently receiving bladder irrigation.  No blood noted in return.    Review of Systems   All pertinent negatives and positives are as above. All other systems have been reviewed and are negative unless otherwise stated.     Objective    Temp:  [97.7 °F (36.5 °C)-100 °F (37.8 °C)] 99.3 °F (37.4 °C)  Heart Rate:  [] 65  Resp:  [18-27] 20  BP: ()/(46-97) 100/66  Physical Exam  Vitals and nursing note reviewed.   Constitutional:       Appearance: Normal appearance. He is obese.   HENT:      Head: Normocephalic and atraumatic.      Right Ear: External ear normal.      Left Ear: External ear normal.      Nose: Nose normal.      Mouth/Throat:      Mouth: Mucous membranes are moist.   Eyes:      Extraocular Movements: Extraocular movements intact.      Pupils: Pupils are equal, round, and reactive to light.   Cardiovascular:      Rate and Rhythm: Normal rate. Rhythm irregular.      Pulses: Normal pulses.      Heart sounds: Normal heart sounds.   Pulmonary:      Effort: Pulmonary effort is normal.      Breath sounds: Normal breath sounds.    Abdominal:      General: Bowel sounds are normal.      Palpations: Abdomen is soft.      Comments: Obese   Genitourinary:     Comments: Law in place continuous bladder irrigation  Urine is clear  Musculoskeletal:         General: Normal range of motion.      Cervical back: Normal range of motion and neck supple.   Skin:     General: Skin is warm and dry.      Capillary Refill: Capillary refill takes less than 2 seconds.   Neurological:      General: No focal deficit present.      Mental Status: He is alert and oriented to person, place, and time.   Psychiatric:         Mood and Affect: Mood normal.         Behavior: Behavior normal.           Results Review:  I have reviewed the labs, radiology results, and diagnostic studies.    Laboratory Data:   Results from last 7 days   Lab Units 05/29/23  1154 05/25/23  0835   WBC 10*3/mm3 1.97* 8.18   HEMOGLOBIN g/dL 12.6* 12.8*   HEMATOCRIT % 41.4 41.0   PLATELETS 10*3/mm3 144 172        Results from last 7 days   Lab Units 05/29/23  1154 05/25/23  0835   SODIUM mmol/L 143 140   POTASSIUM mmol/L 3.9 4.0   CHLORIDE mmol/L 107 104   CO2 mmol/L 24.0 24.0   BUN mg/dL 28* 26*   CREATININE mg/dL 1.29* 1.24   CALCIUM mg/dL 9.0 8.9   BILIRUBIN mg/dL 0.8  --    ALK PHOS U/L 90  --    ALT (SGPT) U/L 11  --    AST (SGOT) U/L 12  --    GLUCOSE mg/dL 164* 171*       Culture Data:   Urine Culture   Date Value Ref Range Status   05/29/2023 >100,000 CFU/mL Escherichia coli (A)  Preliminary       Radiology Data:   Imaging Results (Last 24 Hours)       Procedure Component Value Units Date/Time    CT Abdomen Pelvis With Contrast [[id number]] Collected: 05/29/23 1434     Updated: 05/29/23 1443    Narrative:      CT ABDOMEN PELVIS W CONTRAST- 5/29/2023 2:17 PM CDT     HISTORY: Lower abdominal pain       COMPARISON: None.      DOSE LENGTH PRODUCT: 906 mGy cm. Automated exposure control was also  utilized to decrease patient radiation dose.     TECHNIQUE: Following the intravenous  administration of contrast, helical  CT tomographic images of the abdomen and pelvis were acquired.  Multiplanar reformatted images were provided for review.      FINDINGS:      LIVER: No suspicious liver lesion. The portal veins are patent..      BILIARY SYSTEM: Cholelithiasis. The gallbladder is nondistended. No  intrahepatic or extrahepatic ductal dilatation.      PANCREAS: No focal pancreatic lesion.      SPLEEN: Unremarkable.      KIDNEYS AND ADRENALS: Adrenal glands are unremarkable. 2 mm  nonobstructing left inferior pole renal stone. Striated nephrogram on  the right. No parenchymal abscess. Ureters are nondistended.     RETROPERITONEUM: No mass, lymphadenopathy or hemorrhage.      GI TRACT: The stomach is nondistended. Small bowel is nondilated. Mild  gaseous distention of the transverse colon. No inflammatory changes  along the colon.      OTHER: There is no mesenteric mass, lymphadenopathy or fluid collection.  The abdominopelvic vasculature is patent. Bones are osteopenic. No acute  bony abnormality seen.       PELVIS: No mass lesion, fluid collection or significant lymphadenopathy  is seen in the pelvis. 3 mm bladder stone. Mild to moderate bladder  distention.       Impression:      1. Heterogeneous right nephrogram which may reflect an uncomplicated  right pyelonephritis. There is no hydronephrosis.  2. Mild to moderate urinary bladder distention.  3. 3 mm bladder stone.  4. Cholelithiasis. No CT evidence for acute cholecystitis. Gallbladder  is nondistended and appears noninflamed.     This report was finalized on 05/29/2023 14:40 by Dr Mervin Shore, .    CT Angiogram Chest [[id number]] Collected: 05/29/23 1431     Updated: 05/29/23 1437    Narrative:      CT ANGIOGRAM CHEST- 5/29/2023 2:17 PM CDT      HISTORY: Shortness of breath      COMPARISON: None.      DOSE LENGTH PRODUCT: 247 mGy cm. Automated exposure control was also  utilized to decrease patient radiation dose.     TECHNIQUE: Helical  tomographic images of the chest were obtained after  the administration of intravenous contrast following angiogram protocol.  Additionally, 3D and multiplanar reformatted images were provided.        FINDINGS:    Pulmonary arteries: There is adequate enhancement of the pulmonary  arteries to evaluate for central and segmental pulmonary emboli. There  are no filling defects within the main, lobar, segmental or visualized  subsegmental pulmonary arteries. The pulmonary arteries are within  normal limits for size.      Aorta and great vessels: Thoracic aorta is normal in caliber. No  dissection identified. No flow-limiting stenosis identified at the great  vessel origins.     Visualized neck base: The imaged portion of the base of the neck appears  unremarkable.      Lungs: The lungs are clear. There is no mass, worrisome nodule, or  consolidation. No pleural effusion is seen. Airways are clear.      Heart: The heart is normal in size. There is no pericardial effusion.     Mediastinum and lymph nodes: No suspicious hilar or mediastinal  adenopathy..     Skeletal and soft tissues: Chest wall soft tissues are unremarkable. No  acute bony abnormality. Thoracic spine degenerative change..        Impression:      1. No evidence of pulmonary embolus or other acute cardiopulmonary  process.        This report was finalized on 05/29/2023 14:34 by Dr Mervin Shore, .    XR Chest 1 View [[id number]] Collected: 05/29/23 1218     Updated: 05/29/23 1221    Narrative:      Frontal upright radiograph of the chest 5/29/2023 12:10 PM CDT     HISTORY: Shortness of breath     COMPARISON: Chest exam dated 01/07/2019.     FINDINGS:   The lungs are clear. The cardiomediastinal silhouette and pulmonary  vascularity are within normal limits.      The osseous structures and surrounding soft tissues demonstrate no acute  abnormality.       Impression:      1. No radiographic evidence of acute cardiopulmonary process.        This report was  finalized on 05/29/2023 12:18 by Dr Mervin Shore, .            I have reviewed the patient's current medications.     Assessment/Plan   Assessment  Active Hospital Problems    Diagnosis     **New onset a-fib     Acute UTI (urinary tract infection)     Gross hematuria     Obstructive sleep apnea     Type 2 diabetes mellitus without complication, without long-term current use of insulin     Familial hypercholesterolemia     Essential (primary) hypertension        Treatment Plan  DC IV Cardizem  Monitor blood pressure and heart rate  Oral Cardizem 60 mg q 6 hours  When blood pressure comes up we will transfer to medical floor.    Lab AM  cmp cbc       Medical Decision Making  Number and Complexity of problems:   Acute UTI moderate complexity  Gross hematuria moderate complexity  New onset A-fib high complexity  Obstructive sleep apnea moderate complexity  Type 2 diabetes moderate complexity  Essential hypertension moderate complexity    Differential Diagnosis: none    Conditions and Status        Condition is improving.     [facility] Data  External documents reviewed: no new  Cardiac tracing (EKG, telemetry) interpretation: A-fib rate controlled  Radiology interpretation: Reviewed  Labs reviewed: Reviewed  Any tests that were considered but not ordered: None      Decision rules/scores evaluated (example [id number], Wells, etc): none    Discussed with: Patient     Care Planning  Shared decision making: Patient  Code status and discussions: Full    Disposition  Social Determinants of Health that impact treatment or disposition: None  I expect the patient to be discharged to home in 2-3 none days.     Electronically signed by Suzanna Clemons, 05/30/23, 07:36 CDT.

## 2023-05-30 NOTE — ANESTHESIA PROCEDURE NOTES
Airway  Date/Time: 5/29/2023 8:31 PM  Airway not difficult    General Information and Staff    Patient location during procedure: OR  CRNA/CAA: Chandrakant Camacho CRNA    Indications and Patient Condition  Indications for airway management: airway protection    Preoxygenated: yes  Mask difficulty assessment: 0 - not attempted    Final Airway Details  Final airway type: endotracheal airway      Successful airway: ETT  Cuffed: yes   Successful intubation technique: video laryngoscopy and RSI  Facilitating devices/methods: intubating stylet and cricoid pressure  Endotracheal tube insertion site: oral  Blade: Reyes  Blade size: 4  ETT size (mm): 8.0  Cormack-Lehane Classification: grade I - full view of glottis  Placement verified by: capnometry   Cuff volume (mL): 10  Measured from: lips  ETT/EBT  to lips (cm): 23  Number of attempts at approach: 1  Assessment: lips, teeth, and gum same as pre-op and atraumatic intubation

## 2023-05-30 NOTE — CASE MANAGEMENT/SOCIAL WORK
Discharge Planning Assessment   [place]     Patient Name: Humberto Auguste Jr.  MRN: [id number]  Today's Date: 5/30/2023    Admit Date: 5/29/2023        Discharge Needs Assessment       Row Name 05/30/23 1014       Living Environment    People in Home spouse    Name(s) of People in Home Roseline Auguste    Current Living Arrangements home    Primary Care Provided by self    Provides Primary Care For no one    Family Caregiver if Needed spouse    Quality of Family Relationships supportive    Able to Return to Prior Arrangements yes       Resource/Environmental Concerns    Resource/Environmental Concerns none    Transportation Concerns none       Food Insecurity    Within the past 12 months, you worried that your food would run out before you got the money to buy more. Never true    Within the past 12 months, the food you bought just didn't last and you didn't have money to get more. Never true       Transition Planning    Patient/Family Anticipates Transition to home with family    Patient/Family Anticipated Services at Transition none    Transportation Anticipated family or friend will provide       Discharge Needs Assessment    Readmission Within the Last 30 Days no previous admission in last 30 days    Equipment Currently Used at Home none    Concerns to be Addressed no discharge needs identified    Anticipated Changes Related to Illness none    Equipment Needed After Discharge none    Discharge Coordination/Progress Patient resides at home with his spouse and is independent.  Patient has a PCP and rX coverage.  Patient plans to return home upon discharge.  SW will follow for needs.                   Discharge Plan    No documentation.                 Continued Care and Services - Admitted Since 5/29/2023    Coordination has not been started for this encounter.          Demographic Summary    No documentation.                  Functional Status    No documentation.                  Psychosocial    No  documentation.                  Abuse/Neglect    No documentation.                  Legal    No documentation.                  Substance Abuse    No documentation.                  Patient Forms    No documentation.                     TEREZA RogersW

## 2023-05-30 NOTE — PLAN OF CARE
Goal Outcome Evaluation:         Cardizem at 5. HR less than 100 since returning from surgery, still in a-fib. Output through continuous bladder irrigation system has been clear since returning from surgery. Output exceeds input given in CCU. C/o pain to meatus, administered norco x2, see MAR for details. Two loose stools this shift. Max temp 99.3. O2 at 5L, sats >90%.

## 2023-05-31 ENCOUNTER — APPOINTMENT (OUTPATIENT)
Dept: CARDIOLOGY | Facility: HOSPITAL | Age: 73
DRG: 690 | End: 2023-05-31
Payer: MEDICARE

## 2023-05-31 VITALS
SYSTOLIC BLOOD PRESSURE: 102 MMHG | BODY MASS INDEX: 35.78 KG/M2 | TEMPERATURE: 98 F | WEIGHT: 287.8 LBS | RESPIRATION RATE: 20 BRPM | DIASTOLIC BLOOD PRESSURE: 59 MMHG | OXYGEN SATURATION: 93 % | HEART RATE: 89 BPM | HEIGHT: 75 IN

## 2023-05-31 LAB
ALBUMIN SERPL-MCNC: 3.5 G/DL (ref 3.5–5.2)
ALBUMIN/GLOB SERPL: 1.4 G/DL
ALP SERPL-CCNC: 73 U/L (ref 39–117)
ALT SERPL W P-5'-P-CCNC: 14 U/L (ref 1–41)
ANION GAP SERPL CALCULATED.3IONS-SCNC: 9 MMOL/L (ref 5–15)
AST SERPL-CCNC: 15 U/L (ref 1–40)
BACTERIA SPEC AEROBE CULT: ABNORMAL
BASOPHILS # BLD AUTO: 0.03 10*3/MM3 (ref 0–0.2)
BASOPHILS NFR BLD AUTO: 0.2 % (ref 0–1.5)
BILIRUB SERPL-MCNC: 0.3 MG/DL (ref 0–1.2)
BUN SERPL-MCNC: 37 MG/DL (ref 8–23)
BUN/CREAT SERPL: 27.2 (ref 7–25)
CALCIUM SPEC-SCNC: 8.1 MG/DL (ref 8.6–10.5)
CHLORIDE SERPL-SCNC: 105 MMOL/L (ref 98–107)
CO2 SERPL-SCNC: 27 MMOL/L (ref 22–29)
CREAT SERPL-MCNC: 1.36 MG/DL (ref 0.76–1.27)
DEPRECATED RDW RBC AUTO: 46.4 FL (ref 37–54)
EGFRCR SERPLBLD CKD-EPI 2021: 55.6 ML/MIN/1.73
EOSINOPHIL # BLD AUTO: 0.12 10*3/MM3 (ref 0–0.4)
EOSINOPHIL NFR BLD AUTO: 0.9 % (ref 0.3–6.2)
ERYTHROCYTE [DISTWIDTH] IN BLOOD BY AUTOMATED COUNT: 12.6 % (ref 12.3–15.4)
GLOBULIN UR ELPH-MCNC: 2.5 GM/DL
GLUCOSE BLDC GLUCOMTR-MCNC: 168 MG/DL (ref 70–130)
GLUCOSE SERPL-MCNC: 118 MG/DL (ref 65–99)
HCT VFR BLD AUTO: 37.9 % (ref 37.5–51)
HGB BLD-MCNC: 11.1 G/DL (ref 13–17.7)
LYMPHOCYTES # BLD AUTO: 0.76 10*3/MM3 (ref 0.7–3.1)
LYMPHOCYTES NFR BLD AUTO: 5.9 % (ref 19.6–45.3)
MCH RBC QN AUTO: 29.2 PG (ref 26.6–33)
MCHC RBC AUTO-ENTMCNC: 29.3 G/DL (ref 31.5–35.7)
MCV RBC AUTO: 99.7 FL (ref 79–97)
MONOCYTES # BLD AUTO: 0.98 10*3/MM3 (ref 0.1–0.9)
MONOCYTES NFR BLD AUTO: 7.7 % (ref 5–12)
NEUTROPHILS NFR BLD AUTO: 10.83 10*3/MM3 (ref 1.7–7)
NEUTROPHILS NFR BLD AUTO: 84.8 % (ref 42.7–76)
PLATELET # BLD AUTO: 122 10*3/MM3 (ref 140–450)
PMV BLD AUTO: 10.8 FL (ref 6–12)
POTASSIUM SERPL-SCNC: 4.1 MMOL/L (ref 3.5–5.2)
PROT SERPL-MCNC: 6 G/DL (ref 6–8.5)
QT INTERVAL: 278 MS
QT INTERVAL: 344 MS
QT INTERVAL: 398 MS
QTC INTERVAL: 413 MS
QTC INTERVAL: 450 MS
QTC INTERVAL: 470 MS
RBC # BLD AUTO: 3.8 10*6/MM3 (ref 4.14–5.8)
SODIUM SERPL-SCNC: 141 MMOL/L (ref 136–145)
WBC NRBC COR # BLD: 12.78 10*3/MM3 (ref 3.4–10.8)

## 2023-05-31 PROCEDURE — 94760 N-INVAS EAR/PLS OXIMETRY 1: CPT

## 2023-05-31 PROCEDURE — 80053 COMPREHEN METABOLIC PANEL: CPT | Performed by: FAMILY MEDICINE

## 2023-05-31 PROCEDURE — 82948 REAGENT STRIP/BLOOD GLUCOSE: CPT

## 2023-05-31 PROCEDURE — 93246 EXT ECG>7D<15D RECORDING: CPT

## 2023-05-31 PROCEDURE — 85025 COMPLETE CBC W/AUTO DIFF WBC: CPT | Performed by: FAMILY MEDICINE

## 2023-05-31 PROCEDURE — 63710000001 INSULIN LISPRO (HUMAN) PER 5 UNITS: Performed by: UROLOGY

## 2023-05-31 PROCEDURE — 94799 UNLISTED PULMONARY SVC/PX: CPT

## 2023-05-31 RX ORDER — HYDROCODONE BITARTRATE AND ACETAMINOPHEN 5; 325 MG/1; MG/1
1 TABLET ORAL EVERY 6 HOURS PRN
Qty: 12 TABLET | Refills: 0 | Status: SHIPPED | OUTPATIENT
Start: 2023-05-31 | End: 2023-06-05

## 2023-05-31 RX ORDER — CEFDINIR 300 MG/1
300 CAPSULE ORAL 2 TIMES DAILY
Qty: 20 CAPSULE | Refills: 0 | Status: SHIPPED | OUTPATIENT
Start: 2023-05-31 | End: 2023-06-10

## 2023-05-31 RX ORDER — DILTIAZEM HYDROCHLORIDE 240 MG/1
240 CAPSULE, COATED, EXTENDED RELEASE ORAL DAILY
Qty: 30 CAPSULE | Refills: 0 | Status: SHIPPED | OUTPATIENT
Start: 2023-05-31

## 2023-05-31 RX ADMIN — DILTIAZEM HYDROCHLORIDE 60 MG: 60 TABLET, FILM COATED ORAL at 11:43

## 2023-05-31 RX ADMIN — INSULIN LISPRO 2 UNITS: 100 INJECTION, SOLUTION INTRAVENOUS; SUBCUTANEOUS at 08:29

## 2023-05-31 RX ADMIN — DILTIAZEM HYDROCHLORIDE 60 MG: 60 TABLET, FILM COATED ORAL at 00:45

## 2023-05-31 RX ADMIN — LISINOPRIL 10 MG: 10 TABLET ORAL at 08:31

## 2023-05-31 RX ADMIN — TAMSULOSIN HYDROCHLORIDE 0.8 MG: 0.4 CAPSULE ORAL at 08:31

## 2023-05-31 RX ADMIN — HYDROCODONE BITARTRATE AND ACETAMINOPHEN 1 TABLET: 5; 325 TABLET ORAL at 03:00

## 2023-05-31 RX ADMIN — DILTIAZEM HYDROCHLORIDE 60 MG: 60 TABLET, FILM COATED ORAL at 06:06

## 2023-05-31 NOTE — DISCHARGE SUMMARY
[facility] ER Medicine Services  DISCHARGE SUMMARY       Date of Admission: 5/29/2023  Date of Discharge:  5/31/2023  Primary Care Physician: Feliciano Kinsey MD    Presenting Problem/History of Present Illness:  Bladder and penile pain with gross hematuria    Final Discharge Diagnoses:   **New onset a-fib      Acute UTI (urinary tract infection)      Gross hematuria      Obstructive sleep apnea      Type 2 diabetes mellitus without complication, without long-term current use of insulin      Familial hypercholesterolemia      Essential (primary) hypertension        Consults:   Cardiology   Urology,     Procedures Performed:   CYSTOSCOPY WITH CLOT EVACUATION  BLADDER NECK CONTRACTURE INCISION AND DILATION  REMOVAL OF BLADDER CALCULUS    Pertinent Test Results:   Imaging Results (Last 7 Days)       Procedure Component Value Units Date/Time    CT Abdomen Pelvis With Contrast [[id number]] Collected: 05/29/23 1434     Updated: 05/29/23 1443    Narrative:      CT ABDOMEN PELVIS W CONTRAST- 5/29/2023 2:17 PM CDT     HISTORY: Lower abdominal pain       COMPARISON: None.      DOSE LENGTH PRODUCT: 906 mGy cm. Automated exposure control was also  utilized to decrease patient radiation dose.     TECHNIQUE: Following the intravenous administration of contrast, helical  CT tomographic images of the abdomen and pelvis were acquired.  Multiplanar reformatted images were provided for review.      FINDINGS:      LIVER: No suspicious liver lesion. The portal veins are patent..      BILIARY SYSTEM: Cholelithiasis. The gallbladder is nondistended. No  intrahepatic or extrahepatic ductal dilatation.      PANCREAS: No focal pancreatic lesion.      SPLEEN: Unremarkable.      KIDNEYS AND ADRENALS: Adrenal glands are unremarkable. 2 mm  nonobstructing left inferior pole renal stone. Striated nephrogram on  the right. No parenchymal abscess. Ureters are nondistended.     RETROPERITONEUM: No mass,  lymphadenopathy or hemorrhage.      GI TRACT: The stomach is nondistended. Small bowel is nondilated. Mild  gaseous distention of the transverse colon. No inflammatory changes  along the colon.      OTHER: There is no mesenteric mass, lymphadenopathy or fluid collection.  The abdominopelvic vasculature is patent. Bones are osteopenic. No acute  bony abnormality seen.       PELVIS: No mass lesion, fluid collection or significant lymphadenopathy  is seen in the pelvis. 3 mm bladder stone. Mild to moderate bladder  distention.       Impression:      1. Heterogeneous right nephrogram which may reflect an uncomplicated  right pyelonephritis. There is no hydronephrosis.  2. Mild to moderate urinary bladder distention.  3. 3 mm bladder stone.  4. Cholelithiasis. No CT evidence for acute cholecystitis. Gallbladder  is nondistended and appears noninflamed.     This report was finalized on 05/29/2023 14:40 by Dr Mervin Shore, .    CT Angiogram Chest [[id number]] Collected: 05/29/23 1431     Updated: 05/29/23 1437    Narrative:      CT ANGIOGRAM CHEST- 5/29/2023 2:17 PM CDT      HISTORY: Shortness of breath      COMPARISON: None.      DOSE LENGTH PRODUCT: 247 mGy cm. Automated exposure control was also  utilized to decrease patient radiation dose.     TECHNIQUE: Helical tomographic images of the chest were obtained after  the administration of intravenous contrast following angiogram protocol.  Additionally, 3D and multiplanar reformatted images were provided.        FINDINGS:    Pulmonary arteries: There is adequate enhancement of the pulmonary  arteries to evaluate for central and segmental pulmonary emboli. There  are no filling defects within the main, lobar, segmental or visualized  subsegmental pulmonary arteries. The pulmonary arteries are within  normal limits for size.      Aorta and great vessels: Thoracic aorta is normal in caliber. No  dissection identified. No flow-limiting stenosis identified at the great  vessel  origins.     Visualized neck base: The imaged portion of the base of the neck appears  unremarkable.      Lungs: The lungs are clear. There is no mass, worrisome nodule, or  consolidation. No pleural effusion is seen. Airways are clear.      Heart: The heart is normal in size. There is no pericardial effusion.     Mediastinum and lymph nodes: No suspicious hilar or mediastinal  adenopathy..     Skeletal and soft tissues: Chest wall soft tissues are unremarkable. No  acute bony abnormality. Thoracic spine degenerative change..        Impression:      1. No evidence of pulmonary embolus or other acute cardiopulmonary  process.        This report was finalized on 05/29/2023 14:34 by Dr Mervin Shore, .    XR Chest 1 View [[id number]] Collected: 05/29/23 1218     Updated: 05/29/23 1221    Narrative:      Frontal upright radiograph of the chest 5/29/2023 12:10 PM CDT     HISTORY: Shortness of breath     COMPARISON: Chest exam dated 01/07/2019.     FINDINGS:   The lungs are clear. The cardiomediastinal silhouette and pulmonary  vascularity are within normal limits.      The osseous structures and surrounding soft tissues demonstrate no acute  abnormality.       Impression:      1. No radiographic evidence of acute cardiopulmonary process.        This report was finalized on 05/29/2023 12:18 by Dr Mervin Shore, .          Lab Results (last 7 days)       Procedure Component Value Units Date/Time    Urine Culture - Urine, Urine, Catheter [[id number]]  (Abnormal)  (Susceptibility) Collected: 05/29/23 1221    Specimen: Urine, Catheter Updated: 05/31/23 0925     Urine Culture >100,000 CFU/mL Escherichia coli    Narrative:      Colonization of the urinary tract without infection is common. Treatment is discouraged unless the patient is symptomatic, pregnant, or undergoing an invasive urologic procedure.    Susceptibility        Escherichia coli      JOSEP      Ampicillin Resistant      Ampicillin + Sulbactam Intermediate       Cefazolin Susceptible      Cefepime Susceptible      Ceftazidime Susceptible      Ceftriaxone Susceptible      Gentamicin Susceptible      Levofloxacin Susceptible      Nitrofurantoin Susceptible      Piperacillin + Tazobactam Susceptible      Trimethoprim + Sulfamethoxazole Susceptible                           POC Glucose Once [[id number]]  (Abnormal) Collected: 05/31/23 0730    Specimen: Blood Updated: 05/31/23 0741     Glucose 168 mg/dL      Comment: : [id number] Oumar LozanoMeter ID: [id number]       Comprehensive Metabolic Panel [[id number]]  (Abnormal) Collected: 05/31/23 0341    Specimen: Blood Updated: 05/31/23 0419     Glucose 118 mg/dL      BUN 37 mg/dL      Creatinine 1.36 mg/dL      Sodium 141 mmol/L      Potassium 4.1 mmol/L      Chloride 105 mmol/L      CO2 27.0 mmol/L      Calcium 8.1 mg/dL      Total Protein 6.0 g/dL      Albumin 3.5 g/dL      ALT (SGPT) 14 U/L      AST (SGOT) 15 U/L      Alkaline Phosphatase 73 U/L      Total Bilirubin 0.3 mg/dL      Globulin 2.5 gm/dL      A/G Ratio 1.4 g/dL      BUN/Creatinine Ratio 27.2     Anion Gap 9.0 mmol/L      eGFR 55.6 mL/min/1.73     Narrative:      GFR Normal >60  Chronic Kidney Disease <60  Kidney Failure <15    The GFR formula is only valid for adults with stable renal function between ages 18 and 70.    CBC & Differential [[id number]]  (Abnormal) Collected: 05/31/23 0341    Specimen: Blood Updated: 05/31/23 0407    Narrative:      The following orders were created for panel order CBC & Differential.  Procedure                               Abnormality         Status                     ---------                               -----------         ------                     CBC Auto Differential[[id number]]        Abnormal            Final result                 Please view results for these tests on the individual orders.    CBC Auto Differential [[id number]]  (Abnormal) Collected: 05/31/23 0341    Specimen: Blood Updated: 05/31/23 0407     WBC 12.78  10*3/mm3      RBC 3.80 10*6/mm3      Hemoglobin 11.1 g/dL      Hematocrit 37.9 %      MCV 99.7 fL      MCH 29.2 pg      MCHC 29.3 g/dL      RDW 12.6 %      RDW-SD 46.4 fl      MPV 10.8 fL      Platelets 122 10*3/mm3      Neutrophil % 84.8 %      Lymphocyte % 5.9 %      Monocyte % 7.7 %      Eosinophil % 0.9 %      Basophil % 0.2 %      Neutrophils, Absolute 10.83 10*3/mm3      Lymphocytes, Absolute 0.76 10*3/mm3      Monocytes, Absolute 0.98 10*3/mm3      Eosinophils, Absolute 0.12 10*3/mm3      Basophils, Absolute 0.03 10*3/mm3     POC Glucose Once [[id number]]  (Abnormal) Collected: 05/30/23 2052    Specimen: Blood Updated: 05/30/23 2104     Glucose 146 mg/dL      Comment: : [id number] Aurea PhillipMeter ID: [id number]       POC Glucose Once [[id number]]  (Normal) Collected: 05/30/23 1745    Specimen: Blood Updated: 05/30/23 1755     Glucose 127 mg/dL      Comment: : [id number] Rishabh ([place]) MalloryMeter ID: [id number]       Blood Culture - Blood, Arm, Right [[id number]]  (Normal) Collected: 05/29/23 1215    Specimen: Blood from Arm, Right Updated: 05/30/23 1246     Blood Culture No growth at 24 hours    Blood Culture - Blood, Arm, Right [[id number]]  (Normal) Collected: 05/29/23 1232    Specimen: Blood from Arm, Right Updated: 05/30/23 1246     Blood Culture No growth at 24 hours    POC Glucose Once [[id number]]  (Abnormal) Collected: 05/30/23 1145    Specimen: Blood Updated: 05/30/23 1200     Glucose 143 mg/dL      Comment: : [id number] Opal Alejandro ID: [id number]       Manual Differential [[id number]]  (Abnormal) Collected: 05/30/23 0826    Specimen: Blood Updated: 05/30/23 0942     Neutrophil % 62.0 %      Lymphocyte % 4.0 %      Monocyte % 1.0 %      Basophil % 1.0 %      Bands %  32.0 %      Neutrophils Absolute 14.65 10*3/mm3      Lymphocytes Absolute 0.62 10*3/mm3      Monocytes Absolute 0.16 10*3/mm3      Basophils Absolute 0.16 10*3/mm3      RBC Morphology Normal     WBC Morphology  Normal     Platelet Estimate Decreased    CBC & Differential [[id number]]  (Abnormal) Collected: 05/30/23 0826    Specimen: Blood Updated: 05/30/23 0942    Narrative:      The following orders were created for panel order CBC & Differential.  Procedure                               Abnormality         Status                     ---------                               -----------         ------                     CBC Auto Differential[[id number]]        Abnormal            Final result                 Please view results for these tests on the individual orders.    CBC Auto Differential [[id number]]  (Abnormal) Collected: 05/30/23 0826    Specimen: Blood Updated: 05/30/23 0942     WBC 15.59 10*3/mm3      RBC 3.69 10*6/mm3      Hemoglobin 10.7 g/dL      Hematocrit 36.7 %      MCV 99.5 fL      MCH 29.0 pg      MCHC 29.2 g/dL      RDW 12.9 %      RDW-SD 46.5 fl      MPV 11.2 fL      Platelets 131 10*3/mm3     Comprehensive Metabolic Panel [[id number]]  (Abnormal) Collected: 05/30/23 0826    Specimen: Blood Updated: 05/30/23 0934     Glucose 133 mg/dL      BUN 36 mg/dL      Creatinine 1.50 mg/dL      Sodium 141 mmol/L      Potassium 4.3 mmol/L      Chloride 105 mmol/L      CO2 24.0 mmol/L      Calcium 8.1 mg/dL      Total Protein 6.1 g/dL      Albumin 3.6 g/dL      ALT (SGPT) 14 U/L      AST (SGOT) 16 U/L      Alkaline Phosphatase 64 U/L      Total Bilirubin 0.4 mg/dL      Globulin 2.5 gm/dL      A/G Ratio 1.4 g/dL      BUN/Creatinine Ratio 24.0     Anion Gap 12.0 mmol/L      eGFR 49.5 mL/min/1.73     Narrative:      GFR Normal >60  Chronic Kidney Disease <60  Kidney Failure <15    The GFR formula is only valid for adults with stable renal function between ages 18 and 70.    POC Glucose Once [[id number]]  (Normal) Collected: 05/30/23 0743    Specimen: Blood Updated: 05/30/23 0755     Glucose 122 mg/dL      Comment: : [id number] Edwardromie JIMENEZsegundo ID: [id number]       POC Glucose Once [[id number]]  (Abnormal) Collected:  05/30/23 0009    Specimen: Blood Updated: 05/30/23 0020     Glucose 261 mg/dL      Comment: : [id number] Bhavya NicolasMeter ID: [id number]       POC Glucose Once [[id number]]  (Abnormal) Collected: 05/29/23 2123    Specimen: Blood Updated: 05/29/23 2134     Glucose 174 mg/dL      Comment: : [id number] Ivett Leungeter ID: [id number]       POC Glucose Once [[id number]]  (Abnormal) Collected: 05/29/23 1818    Specimen: Blood Updated: 05/29/23 1829     Glucose 138 mg/dL      Comment: : [id number] Sara GallowaynMeter ID: [id number]       STAT Lactic Acid, Reflex [[id number]]  (Normal) Collected: 05/29/23 1323    Specimen: Blood Updated: 05/29/23 1350     Lactate 1.6 mmol/L     Urinalysis, Microscopic Only - Urine, Catheter [[id number]]  (Abnormal) Collected: 05/29/23 1221    Specimen: Urine, Catheter Updated: 05/29/23 1301     RBC, UA Too Numerous to Count /HPF      WBC, UA 31-50 /HPF      Bacteria, UA 1+ /HPF      Squamous Epithelial Cells, UA 0-2 /HPF      Hyaline Casts, UA 0-2 /LPF      Methodology Manual Light Microscopy    Narrative:      Microscopic examination performed on non-centrifuged sample due to increased RBCs.     Urinalysis With Culture If Indicated - Urine, Catheter [[id number]]  (Abnormal) Collected: 05/29/23 1221    Specimen: Urine, Catheter Updated: 05/29/23 1301     Color, UA Red     Appearance, UA Turbid     pH, UA 6.0     Specific Gravity, UA 1.013     Glucose, UA Negative     Ketones, UA Negative     Bilirubin, UA Large (3+)     Blood, UA Moderate (2+)     Protein,  mg/dL (2+)     Leuk Esterase, UA Large (3+)     Nitrite, UA Positive     Urobilinogen, UA 0.2 E.U./dL    Narrative:      In absence of clinical symptoms, the presence of pyuria, bacteria, and/or nitrites on the urinalysis result does not correlate with infection.    High Sensitivity Troponin T 2Hr [[id number]]  (Abnormal) Collected: 05/29/23 1232    Specimen: Blood Updated: 05/29/23 1258     HS Troponin T 17 ng/L       "Troponin T Delta -9 ng/L     Narrative:      High Sensitive Troponin T Reference Range:  <10.0 ng/L- Negative Female for AMI  <15.0 ng/L- Negative Male for AMI  >=10 - Abnormal Female indicating possible myocardial injury.  >=15 - Abnormal Male indicating possible myocardial injury.   Clinicians would have to utilize clinical acumen, EKG, Troponin, and serial changes to determine if it is an Acute Myocardial Infarction or myocardial injury due to an underlying chronic condition.         Lactic Acid, Plasma [[id number]]  (Abnormal) Collected: 05/29/23 1154    Specimen: Blood Updated: 05/29/23 1238     Lactate 2.1 mmol/L     Procalcitonin [[id number]]  (Abnormal) Collected: 05/29/23 1154    Specimen: Blood Updated: 05/29/23 1230     Procalcitonin 0.69 ng/mL     Narrative:      As a Marker for Sepsis (Non-Neonates):    1. <0.5 ng/mL represents a low risk of severe sepsis and/or septic shock.  2. >2 ng/mL represents a high risk of severe sepsis and/or septic shock.    As a Marker for Lower Respiratory Tract Infections that require antibiotic therapy:    PCT on Admission    Antibiotic Therapy       6-12 Hrs later    >0.5                Strongly Recommended  >0.25 - <0.5        Recommended   0.1 - 0.25          Discouraged              Remeasure/reassess PCT  <0.1                Strongly Discouraged     Remeasure/reassess PCT    As 28 day mortality risk marker: \"Change in Procalcitonin Result\" (>80% or <=80%) if Day 0 (or Day 1) and Day 4 values are available. Refer to http://www.[employer]-pct-calculator.com    Change in PCT <=80%  A decrease of PCT levels below or equal to 80% defines a positive change in PCT test result representing a higher risk for 28-day all-cause mortality of patients diagnosed with severe sepsis for septic shock.    Change in PCT >80%  A decrease of PCT levels of more than 80% defines a negative change in PCT result representing a lower risk for 28-day all-cause mortality of patients diagnosed with " severe sepsis or septic shock.       Comprehensive Metabolic Panel [[id number]]  (Abnormal) Collected: 05/29/23 1154    Specimen: Blood Updated: 05/29/23 1224     Glucose 164 mg/dL      BUN 28 mg/dL      Creatinine 1.29 mg/dL      Sodium 143 mmol/L      Potassium 3.9 mmol/L      Chloride 107 mmol/L      CO2 24.0 mmol/L      Calcium 9.0 mg/dL      Total Protein 6.7 g/dL      Albumin 4.1 g/dL      ALT (SGPT) 11 U/L      AST (SGOT) 12 U/L      Alkaline Phosphatase 90 U/L      Total Bilirubin 0.8 mg/dL      Globulin 2.6 gm/dL      A/G Ratio 1.6 g/dL      BUN/Creatinine Ratio 21.7     Anion Gap 12.0 mmol/L      eGFR 59.3 mL/min/1.73     Narrative:      GFR Normal >60  Chronic Kidney Disease <60  Kidney Failure <15    The GFR formula is only valid for adults with stable renal function between ages 18 and 70.    Magnesium [[id number]]  (Normal) Collected: 05/29/23 1154    Specimen: Blood Updated: 05/29/23 1224     Magnesium 1.8 mg/dL     CK [[id number]]  (Normal) Collected: 05/29/23 1154    Specimen: Blood Updated: 05/29/23 1223     Creatine Kinase 46 U/L     High Sensitivity Troponin T [[id number]]  (Abnormal) Collected: 05/29/23 1154    Specimen: Blood Updated: 05/29/23 1220     HS Troponin T 26 ng/L     Narrative:      High Sensitive Troponin T Reference Range:  <10.0 ng/L- Negative Female for AMI  <15.0 ng/L- Negative Male for AMI  >=10 - Abnormal Female indicating possible myocardial injury.  >=15 - Abnormal Male indicating possible myocardial injury.   Clinicians would have to utilize clinical acumen, EKG, Troponin, and serial changes to determine if it is an Acute Myocardial Infarction or myocardial injury due to an underlying chronic condition.         D-dimer, Quantitative [[id number]]  (Abnormal) Collected: 05/29/23 1154    Specimen: Blood Updated: 05/29/23 1211     D-Dimer, Quantitative 3.60 MCGFEU/mL     Narrative:      According to the assay 's published package insert, a normal (<0.50 MCGFEU/mL)  "D-dimer result in conjunction with a non-high clinical probability assessment, excludes deep vein thrombosis (DVT) and pulmonary embolism (PE) with high sensitivity.    D-dimer values increase with age and this can make VTE exclusion of an older population difficult. To address this, the [employer], based on best available evidence and recent guidelines, recommends that clinicians use age-adjusted D-dimer thresholds in patients greater than 50 years of age with: a) a low probability of PE who do not meet all Pulmonary Embolism Rule Out Criteria, or b) in those with intermediate probability of PE.   The formula for an age-adjusted D-dimer cut-off is \"age/100\".  For example, a 60 year old patient would have an age-adjusted cut-off of 0.60 MCGFEU/mL and an 80 year old 0.80 MCGFEU/mL.    CBC & Differential [[id number]]  (Abnormal) Collected: 05/29/23 1154    Specimen: Blood Updated: 05/29/23 1208    Narrative:      The following orders were created for panel order CBC & Differential.  Procedure                               Abnormality         Status                     ---------                               -----------         ------                     CBC Auto Differential[[id number]]        Abnormal            Final result                 Please view results for these tests on the individual orders.    CBC Auto Differential [[id number]]  (Abnormal) Collected: 05/29/23 1154    Specimen: Blood Updated: 05/29/23 1208     WBC 1.97 10*3/mm3      RBC 4.33 10*6/mm3      Hemoglobin 12.6 g/dL      Hematocrit 41.4 %      MCV 95.6 fL      MCH 29.1 pg      MCHC 30.4 g/dL      RDW 12.4 %      RDW-SD 43.6 fl      MPV 10.3 fL      Platelets 144 10*3/mm3      Neutrophil % 90.4 %      Lymphocyte % 7.1 %      Monocyte % 0.5 %      Eosinophil % 1.0 %      Basophil % 0.5 %      Immature Grans % 0.5 %      Neutrophils, Absolute 1.78 10*3/mm3      Lymphocytes, Absolute 0.14 10*3/mm3      Monocytes, Absolute 0.01 " "10*3/mm3      Eosinophils, Absolute 0.02 10*3/mm3      Basophils, Absolute 0.01 10*3/mm3      Immature Grans, Absolute 0.01 10*3/mm3      nRBC 0.0 /100 WBC           Hospital Course:  The patient is a 72 y.o. male who [facility] to [facility] with gross hematuria and pain in penis/bladder.   Dr Bowden was consulted.   Taken to OR for the above mentioned procedure   Patient did well with procedure.   Developed Afib with RVR  Placed on Cardizem gtt.   Trasitioned to oral cardizem.  Cardiology consulted.   Day of discharge no complaints.   Urology ordered removal of yang and recommended dc home to follow up with outpatient.   Patient anxious to go home.     Physical Exam on Discharge:  /59 (BP Location: Left arm, Patient Position: Lying)   Pulse 89   Temp 98 °F (36.7 °C)   Resp 20   Ht 190.5 cm (75\")   Wt 131 kg (287 lb 12.8 oz)   SpO2 93%   BMI 35.97 kg/m²   Physical Exam  Vitals and nursing note reviewed.   Constitutional:       Appearance: Normal appearance.   HENT:      Head: Normocephalic and atraumatic.      Right Ear: External ear normal.      Left Ear: External ear normal.      Nose: Nose normal.      Mouth/Throat:      Mouth: Mucous membranes are moist.   Eyes:      Extraocular Movements: Extraocular movements intact.      Conjunctiva/sclera: Conjunctivae normal.      Pupils: Pupils are equal, round, and reactive to light.   Cardiovascular:      Rate and Rhythm: Normal rate and regular rhythm.      Pulses: Normal pulses.      Heart sounds: No murmur heard.    No friction rub. No gallop.   Pulmonary:      Effort: Pulmonary effort is normal.      Breath sounds: Normal breath sounds.   Abdominal:      General: Bowel sounds are normal.      Palpations: Abdomen is soft.   Musculoskeletal:         General: Normal range of motion.      Cervical back: Normal range of motion and neck supple.   Skin:     General: Skin is warm and dry.      Capillary Refill: Capillary refill takes less than 2 " seconds.   Neurological:      General: No focal deficit present.      Mental Status: He is alert and oriented to person, place, and time.      Cranial Nerves: No cranial nerve deficit.   Psychiatric:         Mood and Affect: Mood normal.         Behavior: Behavior normal.         Condition on Discharge:   Stable improved    Discharge Disposition:  Home or Self Care    Discharge Medications:     Discharge Medications        New Medications        Instructions Start Date   cefdinir 300 MG capsule  Commonly known as: OMNICEF   300 mg, Oral, 2 Times Daily      dilTIAZem  MG 24 hr capsule  Commonly known as: Cartia XT   240 mg, Oral, Daily      HYDROcodone-acetaminophen 5-325 MG per tablet  Commonly known as: NORCO   1 tablet, Oral, Every 6 Hours PRN             Changes to Medications        Instructions Start Date   tamsulosin 0.4 MG capsule 24 hr capsule  Commonly known as: FLOMAX  What changed: how much to take   0.4 mg, Oral, Daily             Continue These Medications        Instructions Start Date   Cod Liver Oil 1000 MG capsule   2 capsules, Oral, Daily      furosemide 40 MG tablet  Commonly known as: LASIX   40 mg, Oral, Daily PRN      lisinopril 40 MG tablet  Commonly known as: PRINIVIL,ZESTRIL   10 mg, Oral, Daily      metFORMIN 500 MG tablet  Commonly known as: GLUCOPHAGE   500 mg, Oral, 2 Times Daily With Meals      sildenafil 100 MG tablet  Commonly known as: VIAGRA   100 mg, Oral, Take As Directed, 1-4 hours before sexual activity      simvastatin 40 MG tablet  Commonly known as: ZOCOR   40 mg, Oral, Nightly             Discharge Diet:   Diet Instructions       Diet: Regular/House Diet, Diabetic Diets; Consistent Carbohydrate; Regular Texture (IDDSI 7); Thin (IDDSI 0)      Discharge Diet:  Regular/House Diet  Diabetic Diets       Diabetic Diet: Consistent Carbohydrate    Texture: Regular Texture (IDDSI 7)    Fluid Consistency: Thin (IDDSI 0)          Activity at Discharge:   Activity Instructions        Activity as Tolerated              Follow-up Appointments:   Future Appointments   Date Time Provider Department Center   6/1/2023  9:20 AM Camron Bowden MD [facility]   5/1/2024  8:30 AM PAD [facility] US [facility]   5/1/2024  9:00 AM Laura Omalley APRN [facility] P[facility]     Cardiology 4 weeks  Urology 4 weeks  PCP 5-7 days    Test Results Pending at Discharge: none    Suzanna Clemons  05/31/23  10:34 CDT    Time: 30 minutes.

## 2023-05-31 NOTE — PLAN OF CARE
Goal Outcome Evaluation:  Plan of Care Reviewed With: patient      Patient c/o pain at catheter insertion site, PRN pain med given. No c/o nausea. Pt on 2 L/min per NC. Continuous pulse ox in use. Law c/d/I with clear, yellow urine out. SCDs on. A&O x4. Tele on, a-fib. VSS.

## 2023-05-31 NOTE — CASE MANAGEMENT/SOCIAL WORK
Discharge Planning Assessment  [facility]     Patient Name: Humberto Auguste Jr.  MRN: [id number]  Today's Date: 5/31/2023    Admit Date: 5/29/2023        Discharge Needs Assessment       Row Name 05/31/23 0944       Living Environment    People in Home spouse    Name(s) of People in Home Roseline Auguste - spouse    Current Living Arrangements home    Potentially Unsafe Housing Conditions none    Primary Care Provided by self    Provides Primary Care For no one    Family Caregiver if Needed spouse    Quality of Family Relationships helpful;involved;supportive    Able to Return to Prior Arrangements yes       Resource/Environmental Concerns    Resource/Environmental Concerns none    Transportation Concerns none       Food Insecurity    Within the past 12 months, you worried that your food would run out before you got the money to buy more. Never true    Within the past 12 months, the food you bought just didn't last and you didn't have money to get more. Never true       Transition Planning    Patient/Family Anticipates Transition to home    Patient/Family Anticipated Services at Transition none    Transportation Anticipated car, drives self;family or friend will provide       Discharge Needs Assessment    Readmission Within the Last 30 Days no previous admission in last 30 days    Equipment Currently Used at Home cpap    Concerns to be Addressed denies needs/concerns at this time;discharge planning    Anticipated Changes Related to Illness none    Discharge Coordination/Progress Pt lives at home with spouse, is very independent and drives; has PCP and RX coverage, denies any needs at this time, hoping to DC home today.                   Discharge Plan    No documentation.                 Continued Care and Services - Admitted Since 5/29/2023    Coordination has not been started for this encounter.          Demographic Summary    No documentation.                  Functional Status    No documentation.                   Psychosocial    No documentation.                  Abuse/Neglect    No documentation.                  Legal    No documentation.                  Substance Abuse    No documentation.                  Patient Forms    No documentation.                     Brook Chavez RN

## 2023-05-31 NOTE — PROGRESS NOTES
[facility] -  Progress Note     LOS: 1 day   Patient Care Team:  Feliciano Kinsey MD as PCP - General  Feliciano Kinsey MD as PCP - Family Medicine  Camron Bowden MD as Consulting Physician (Urology)    Chief Complaint: Follow-up atrial fibrillation    Subjective     Interval History: Patient was taken to the OR last night by Dr. Bowden, for placement of a Law, initiation of continuous bladder irrigation, and other urologic procedures.  During my evaluation tonight, patient states this immediately relieved his discomfort, and he has felt well since.  He is hopeful that his Law catheter will be able to be removed tomorrow, and that he will be able to go home.  Of note, after the procedure in the OR last night, his heart rate came down and has remained well controlled without medications since then, though he was given a 60 mg dose of short acting diltiazem this evening at 1746.  He remains symptomatically unaware that he is in atrial fibrillation, denying palpitations, or any other unusual sensations.    Review of Systems:  Review of Systems   Constitutional: Negative for malaise/fatigue.   Cardiovascular:  Negative for chest pain, claudication, dyspnea on exertion, leg swelling, near-syncope, orthopnea, palpitations, paroxysmal nocturnal dyspnea and syncope.   Respiratory:  Negative for shortness of breath.    Hematologic/Lymphatic: Does not bruise/bleed easily.     Vital Sign Min/Max for last 24 hours  Temp  Min: 97.4 °F (36.3 °C)  Max: 99.3 °F (37.4 °C)   BP  Min: 69/57  Max: 140/81   Pulse  Min: 65  Max: 102   Resp  Min: 19  Max: 23   SpO2  Min: 84 %  Max: 98 %   No data recorded   No data recorded         05/29/23  1630   Weight: 131 kg (287 lb 12.8 oz)       Physical Exam:   Vitals and nursing note reviewed.   Constitutional:       General: Not in acute distress.     Appearance: Not in distress.   Neck:      Vascular: No JVD or JVR. JVD normal.   Pulmonary:       Effort: Pulmonary effort is normal.      Breath sounds: Normal breath sounds.   Cardiovascular:      Normal rate. Irregularly irregular rhythm.      Murmurs: There is no murmur.      No gallop.  No rub.   Pulses:     Intact distal pulses.   Edema:     Peripheral edema absent.   Skin:     General: Skin is warm and dry.   Neurological:      Mental Status: Alert, oriented to person, place, and time and oriented to person, place and time.       Medication Review: yes  Current Facility-Administered Medications   Medication Dose Route Frequency Provider Last Rate Last Admin    atorvastatin (LIPITOR) tablet 10 mg  10 mg Oral Nightly Camron Bowden MD   10 mg at 05/30/23 2035    cefTRIAXone (ROCEPHIN) 1 g in sodium chloride 0.9 % 100 mL IVPB  1 g Intravenous Q24H Camron Bowden  mL/hr at 05/30/23 1745 1 g at 05/30/23 1745    dextrose (D50W) (25 g/50 mL) IV injection 25 g  25 g Intravenous Q15 Min PRN Camron Bowden MD        dextrose (GLUTOSE) oral gel 15 g  15 g Oral Q15 Min PRN Camron Bowden MD        dilTIAZem (CARDIZEM) 125 mg in 125 mL NS infusion  5-15 mg/hr Intravenous Titrated Camron Bowden MD   Stopped at 05/30/23 0730    dilTIAZem (CARDIZEM) tablet 60 mg  60 mg Oral Q6H Suzanna Clemons Micaela   60 mg at 05/30/23 1746    glucagon (GLUCAGEN) injection 1 mg  1 mg Intramuscular Q15 Min PRN Camron Bowden MD        HYDROcodone-acetaminophen (NORCO) 5-325 MG per tablet 1 tablet  1 tablet Oral Q4H PRN Virgen Tijerina DO   1 tablet at 05/30/23 1342    Insulin Lispro (humaLOG) injection 2-7 Units  2-7 Units Subcutaneous 4x Daily AC & at Bedtime Camron Bowden MD   4 Units at 05/30/23 0032    lisinopril (PRINIVIL,ZESTRIL) tablet 10 mg  10 mg Oral Daily Camron Bowden MD   10 mg at 05/29/23 1828    Magnesium Cardiology Dose Replacement - Follow Nurse / BPA Driven Protocol   Does not apply PRN Camron Bowden MD        Potassium Replacement - Follow Nurse / BPA Driven Protocol   Does not  apply PRN Camron Bowden MD        sodium chloride 0.9 % flush 10 mL  10 mL Intravenous PRN Camron Bowden MD        sodium chloride 0.9 % flush 10 mL  10 mL Intravenous Q12H Camron Bowden MD   10 mL at 05/30/23 2035    sodium chloride 0.9 % flush 10 mL  10 mL Intravenous PRN Camron Bowden MD        sodium chloride 0.9 % infusion 40 mL  40 mL Intravenous PRN Camron Bowden MD        tamsulosin (FLOMAX) 24 hr capsule 0.8 mg  0.8 mg Oral Daily Camron Bowden MD   0.8 mg at 05/29/23 1828         Results Review:   I have reviewed all laboratory data, with pertinent findings: Creatinine 1.5 (up from 1.29), WBC up to 15.6 (was 1.9), hemoglobin 10.7, platelets 131    I have reviewed telemetry, which shows rate controlled atrial fibrillation    Results for orders placed during the hospital encounter of 05/29/23    Adult Transthoracic Echo Complete W/ Cont if Necessary Per Protocol    Interpretation Summary    Left ventricular systolic function is normal. Left ventricular ejection fraction appears to be 56 - 60%.    Estimated right ventricular systolic pressure from tricuspid regurgitation is normal (<35 mmHg).    The right ventricular cavity is dilated (though not seen well enough to quantify severity of dilation), with normal systolic function.    No significant (greater than mild) valvular pathology.    Rhythm is atrial fibrillation.    No prior studies available for comparison.      Assessment & Plan       New onset a-fib    Essential (primary) hypertension    Familial hypercholesterolemia    Obstructive sleep apnea    Type 2 diabetes mellitus without complication, without long-term current use of insulin    Acute UTI (urinary tract infection)    Gross hematuria    1.  Persistent atrial fibrillation: Rate controlled after discomfort was resolved after Law catheter placement in the OR last night.  Remains symptomatically unaware.  -CHADS2-VASc=3 - recommend starting eliquis 5mg po bid as soon as felt  safe from urology  -Patient has received 60 mg of p.o. diltiazem tonight, though rate seem to be reasonably well controlled all day.  If he is felt to need some degree of rate control prior to discharge, would advise 120 mg of diltiazem daily.  -14-day Zio patch to be applied prior to discharge (ordered now)  -4-week follow-up with me in the office to reanalyze persistent vs paroxysmal nature of atrial fibrillation, and any potential symptoms that might otherwise become more apparent to the patient as time goes on    2.  Essential hypertension: Well-controlled.  Continue lisinopril.    3.  Obstructive sleep apnea: Continue CPAP.  Discussed interaction of this with atrial fibrillation.    Please call back with any questions.  Otherwise, cardiology will sign off.    Aris Mayfield MD  05/30/23  22:27 CDT

## 2023-05-31 NOTE — PROGRESS NOTES
LOS: 2 days   Patient Care Team:  Feliciano Kinsey MD as PCP - General  Feliciano Kinsey MD as PCP - Family Medicine  Camron Bowden MD as Consulting Physician (Urology)    Chief Complaint:  bladder neck contracture    Subjective     Interval History:     Patient Reports: Feels great. Wants to go home.  Patient Denies:  Fever  History taken from: patient chart    Review of Systems  Pertinent items are noted in HPI, all other systems reviewed and negative     Objective     Vital Signs  Temp:  [97.4 °F (36.3 °C)-98.9 °F (37.2 °C)] 98 °F (36.7 °C)  Heart Rate:  [] 89  Resp:  [20-23] 20  BP: ()/(54-81) 102/59    Physical Exam:  Stable clinical appearance.  Law in place, secured, draining clear yellow urine.     Data Review:       I have reviewed the following data:    Urine Culture - Urine, Urine, Catheter (05/29/2023 12:21)    CBC & Differential (05/31/2023 03:41)    Comprehensive Metabolic Panel (05/31/2023 03:41)    CBC & Differential (05/31/2023 03:41)    Medication Review:     Current Facility-Administered Medications:     atorvastatin (LIPITOR) tablet 10 mg, 10 mg, Oral, Nightly, Camron Bowden MD, 10 mg at 05/30/23 2035    cefTRIAXone (ROCEPHIN) 1 g in sodium chloride 0.9 % 100 mL IVPB, 1 g, Intravenous, Q24H, Camron Bowden MD, Last Rate: 200 mL/hr at 05/30/23 1745, 1 g at 05/30/23 1745    dextrose (D50W) (25 g/50 mL) IV injection 25 g, 25 g, Intravenous, Q15 Min PRN, Camron Bowden MD    dextrose (GLUTOSE) oral gel 15 g, 15 g, Oral, Q15 Min PRN, Camron Bowden MD    dilTIAZem (CARDIZEM) 125 mg in 125 mL NS infusion, 5-15 mg/hr, Intravenous, Titrated, Camron Bowden MD, Stopped at 05/30/23 0730    dilTIAZem (CARDIZEM) tablet 60 mg, 60 mg, Oral, Q6H, Suzanna Clemons, 60 mg at 05/31/23 0606    glucagon (GLUCAGEN) injection 1 mg, 1 mg, Intramuscular, Q15 Min PRN, Camron Bowden MD    HYDROcodone-acetaminophen (NORCO) 5-325 MG per tablet 1 tablet, 1 tablet, Oral,  Q4H PRN, Virgen Tijerina DO, 1 tablet at 05/31/23 0300    Insulin Lispro (humaLOG) injection 2-7 Units, 2-7 Units, Subcutaneous, 4x Daily AC & at Bedtime, Camron Bowden MD, 2 Units at 05/31/23 0829    lisinopril (PRINIVIL,ZESTRIL) tablet 10 mg, 10 mg, Oral, Daily, Camron Bowden MD, 10 mg at 05/31/23 0831    Magnesium Cardiology Dose Replacement - Follow Nurse / BPA Driven Protocol, , Does not apply, PRNKal Donald L, MD    Potassium Replacement - Follow Nurse / BPA Driven Protocol, , Does not apply, Kal SCHWARTZ Donald L, MD    [COMPLETED] Insert Peripheral IV, , , Once **AND** sodium chloride 0.9 % flush 10 mL, 10 mL, Intravenous, PRN, Camron Bowden MD    sodium chloride 0.9 % flush 10 mL, 10 mL, Intravenous, Q12H, Camron Bowden MD, 10 mL at 05/30/23 2035    sodium chloride 0.9 % flush 10 mL, 10 mL, Intravenous, PRN, Camron Bowden MD    sodium chloride 0.9 % infusion 40 mL, 40 mL, Intravenous, PRN, Camron Bowden MD    tamsulosin (FLOMAX) 24 hr capsule 0.8 mg, 0.8 mg, Oral, Daily, Camron Bowden MD, 0.8 mg at 05/31/23 0831    Assessment and Plan:    Bladder neck contracture s/p dilation: Voiding trial today. Norco (Rx ordered).    Leukopenia: Resolved. Mild leukocytosis.  UTI: Cefdinir 300 mg BID for 10 more days (Rx ordered).    ZEESHAN: Improving.     URO DISPO: Follow up with Dr. Bowden in 4 weeks. Message sent to urology .    I discussed the patient's findings and my recommendations with patient    (Please note that portions of this note were completed with a voice recognition program.)    Pranav Pruitt MD  05/31/23  09:35 CDT    Time: Time spent: 20 minutes spent performing evaluation and management, chart review, and discussion with patient, > 50% of time spent in face-to-face encounter

## 2023-06-01 ENCOUNTER — READMISSION MANAGEMENT (OUTPATIENT)
Dept: CALL CENTER | Facility: HOSPITAL | Age: 73
End: 2023-06-01

## 2023-06-01 NOTE — PROGRESS NOTES
"Enter Query Response Below      Query Response: Not due to indwelling Yang.    Electronically signed by Pranav Pruitt MD, 23, 8:13 AM CDT.               If applicable, please update the problem list.     Patient: Humberto Auguste Jr. \"Giorgi\"        : 1950  Account: [id number]           Admit Date: 2023        How to Respond to this query:       a. Click New Note     b. Answer query within the yellow box.                c. Update the Problem List, if applicable.      If you have any questions about this query contact me at: [email or web address]     Dr. Pruitt:    Patient admitted  with hematuria, UTI and new onset afib.  Indwelling yang had been placed  in the ED.  Patient went for cysto, clot evacuation and incision/dilation of bladder neck contracture on .  Treated additionally with antibiotics for urine culture positive for E. Coli.    Can you clarify the cause of the patient's UTI as-    Due to indwelling yang  Not due to indwelling yang  Other_________  Unable to determine     By submitting this query, we are merely seeking further clarification of documentation to accurately reflect all conditions that you are monitoring, evaluating, treating or that extend the hospitalization or utilize additional resources of care. Please utilize your independent clinical judgment when addressing the question(s) above.     This query and your response, once completed, will be entered into the legal medical record.    Sincerely,  Yoli Moore RN, CCDS  Clinical Documentation Integrity Program     "

## 2023-06-01 NOTE — OUTREACH NOTE
Prep Survey      Flowsheet Row Responses   [facility] patient discharged from? [facility]   Is LACE score < 7 ? No   Eligibility Readm Mgmt   Discharge diagnosis New onset a-fib   Does the patient have one of the following disease processes/diagnoses(primary or secondary)? Other   Does the patient have Home health ordered? No   Is there a DME ordered? No   Prep survey completed? Yes            Sheron DE LEON - Registered Nurse

## 2023-06-01 NOTE — PROGRESS NOTES
"Enter Query Response Below      Query Response:   Localized infection only       Electronically signed by Suzanna Clemons, 23, 11:19 AM CDT.       If applicable, please update the problem list.     Patient: Humberto Auguste Jr. \"Giorgi\"        : 1950  Account: [id number]           Admit Date: 2023        How to Respond to this query:       a. Click New Note     b. Answer query within the yellow box.                c. Update the Problem List, if applicable.      If you have any questions about this query contact me at: [email or web address]     Dr. Clemons:    Patient admitted  with hematuria, UTI and new onset afib.  Indwelling yang had been previously placed .  WBC 1.97, Heart rate 130-145, 02 sat 88-89% per nurse's notes and plced on 2L/NC.  Sepsis is documented by the ED physician.  Cardiology note  \"With UTI, leukopenia, and tachycardia, he was initially treated for potential sepsis and received 1 L of IV fluids.  With that, ventricular rate slowed some, and he was also then started on a Cardizem infusion.\"  Treatment also included IV antibiotics.    Can you clarify the condition present and treated as -    Sepsis  Localized infection only  Other _________  Unable to determine     By submitting this query, we are merely seeking further clarification of documentation to accurately reflect all conditions that you are monitoring, evaluating, treating or that extend the hospitalization or utilize additional resources of care. Please utilize your independent clinical judgment when addressing the question(s) above.     This query and your response, once completed, will be entered into the legal medical record.    Sincerely,  Yoli Moore RN, [facility]  Clinical Documentation Integrity Program     "

## 2023-06-03 LAB
BACTERIA SPEC AEROBE CULT: NORMAL
BACTERIA SPEC AEROBE CULT: NORMAL

## 2023-06-06 ENCOUNTER — READMISSION MANAGEMENT (OUTPATIENT)
Dept: CALL CENTER | Facility: HOSPITAL | Age: 73
End: 2023-06-06
Payer: MEDICARE

## 2023-06-09 ENCOUNTER — TELEPHONE (OUTPATIENT)
Dept: UROLOGY | Facility: CLINIC | Age: 73
End: 2023-06-09
Payer: MEDICARE

## 2023-06-09 NOTE — TELEPHONE ENCOUNTER
Marychuy from the office of Dr. Brandon Kinsey called in to ask if Dr. Bowden is okay with the patient starting an anti-coag medication, Eliquis.  Patient was recently in the hospital at [facility].  Dr. Mayfield of  [facility] wanted to evaluate his Afib and start him on the anti-coag.  Please call Marychuy at her direct line, [phone].   Patient/surrogate refused vaccine...

## 2023-07-19 PROBLEM — I48.19 PERSISTENT ATRIAL FIBRILLATION: Status: ACTIVE | Noted: 2023-05-29

## 2023-08-07 ENCOUNTER — PROCEDURE VISIT (OUTPATIENT)
Dept: UROLOGY | Facility: CLINIC | Age: 73
End: 2023-08-07
Payer: MEDICARE

## 2023-08-07 DIAGNOSIS — N32.0 ACQUIRED CONTRACTURE OF BLADDER NECK: Primary | ICD-10-CM

## 2023-08-07 DIAGNOSIS — N32.0 BLADDER NECK CONTRACTURE: ICD-10-CM

## 2023-08-07 NOTE — PROGRESS NOTES
CC: I am here for the doctor to look at my bladder    Cystoscopy procedure note  Pre- operative diagnosis:  Benign prostatic hypertrophy and Bladder neck contracture    Post operative diagnosis:  Same    Procedure:  The patient was prepped and draped in a normal sterile fashion.  The urethra was anesthetized with 2% lidocaine jelly.  A flexible cystoscope was introduced per urethra.      Anterior urethra: The urethra is patent from meatus to sphincter. There is no urothelial lesion, stone, foreign body or other mass  Prostatic urethra: Well resected prostatic fossa.  Bladder: Capacity is reasonable, Trabeculation : moderate, No stones are seen. , and the bladder neck contracture is markedly improved.  I can easily traversed this with the scope.  He has a good urothelial lining around it.  I would estimate this to be about a 22 [place] opening.    Patient tolerated the procedure well    Complications: none    Blood loss: minimal       ASSESSMENT AND PLAN          Problem List Items Addressed This Visit    None  Visit Diagnoses       Acquired contracture of bladder neck    -  Primary            Return in about 6 months (around 2/7/2024) for PSA before visit.      Camron Bowden MD  8/8/2023  17:14 CDT

## 2023-08-25 ENCOUNTER — OFFICE VISIT (OUTPATIENT)
Dept: CARDIOLOGY | Facility: CLINIC | Age: 73
End: 2023-08-25
Payer: MEDICARE

## 2023-08-25 VITALS
DIASTOLIC BLOOD PRESSURE: 70 MMHG | HEART RATE: 75 BPM | SYSTOLIC BLOOD PRESSURE: 128 MMHG | WEIGHT: 286 LBS | OXYGEN SATURATION: 97 % | HEIGHT: 75 IN | BODY MASS INDEX: 35.56 KG/M2

## 2023-08-25 DIAGNOSIS — G47.33 OBSTRUCTIVE SLEEP APNEA: ICD-10-CM

## 2023-08-25 DIAGNOSIS — I10 ESSENTIAL (PRIMARY) HYPERTENSION: ICD-10-CM

## 2023-08-25 DIAGNOSIS — Z79.01 CHRONIC ANTICOAGULATION: ICD-10-CM

## 2023-08-25 DIAGNOSIS — I48.19 PERSISTENT ATRIAL FIBRILLATION: Primary | ICD-10-CM

## 2023-08-25 NOTE — PROGRESS NOTES
Subjective:     Encounter Date:08/25/2023      Patient ID: Humberto Auguste Jr. is a 73 y.o. male.    Chief Complaint: Follow-up atrial fibrillation.    History of Present Illness    Mr. Humberto Auguste, and I met when he was hospitalized for noncardiac reasons, but incidentally discovered to be in atrial fibrillation in late 05/2023. He actually was hospitalized for gross hematuria, and suprapubic pain. He was completely asymptomatic with regards to atrial fibrillation, even despite rapid ventricular response with fluid administration. Ventricular response improved, and ultimately, he was discharged with diltiazem and given Eliquis once felt safe from a urologic standpoint. He was back in our office, and saw SULAIMAN Moreno, here on 06/21/2023. He had not yet been anticoagulated for 4 weeks. The plan then was to consider elective cardioversion once he had been anticoagulated for at least that long. I then did proceed with cardioversion on 07/19/2023. It was successful and did restore sinus rhythm on the first attempt. He was sent home with a 14-day Zio patch with results as noted below. He returns today to discuss further options.    He comes to the clinic today accompanied by an adult female who contributes to his history. He reports that he has not received a call about his Zio patch yet. He states that he went to the car wash on Saturday, and the Zio patch fell off for 1.5 hours. He reports that he never had pain with the Zio patch. The adult female reports that the patient stated that he felt more energetic the day he was shocked, and he went to the car wash with 2 cars, instead of just going with 1. He notes that he had 6 to 7 days after the shock was completed.     He reports that he has not had any hematuria in the last couple of weeks on Eliquis. The adult female reports that he has been back to Dr. Bowden, and he did scope, and everything looks great.    He states that he goes for a follow-up  appointment with Dr. Hunt on 09/07/2023 to discuss possible knee surgery. He has been receiving injections for years in his knee  and knows it needs surgery.     He reports that he has a trip planned for 10/15/2023, and a golfing trip.    The following portions of the patient's history were reviewed and updated as appropriate: allergies, current medications, past family history, past medical history, past social history, past surgical history, and problem list.    Review of Systems   Constitutional: Negative for malaise/fatigue.   Cardiovascular:  Negative for chest pain, claudication, dyspnea on exertion, leg swelling, near-syncope, orthopnea, palpitations, paroxysmal nocturnal dyspnea and syncope.   Respiratory:  Negative for shortness of breath.    Hematologic/Lymphatic: Does not bruise/bleed easily.   Musculoskeletal:         Knee pain.          Current Outpatient Medications:     apixaban (ELIQUIS) 5 MG tablet tablet, Take 1 tablet by mouth 2 (Two) Times a Day., Disp: 60 tablet, Rfl: 11    Cod Liver Oil 1000 MG capsule, Take 2 capsules by mouth Daily., Disp: , Rfl:     dilTIAZem CD (Cartia XT) 240 MG 24 hr capsule, Take 1 capsule by mouth Daily., Disp: 30 capsule, Rfl: 11    furosemide (LASIX) 40 MG tablet, Take 1 tablet by mouth Daily As Needed., Disp: , Rfl:     lisinopril (PRINIVIL,ZESTRIL) 40 MG tablet, Take 10 mg by mouth Daily., Disp: , Rfl:     metFORMIN (GLUCOPHAGE) 500 MG tablet, Take 1 tablet by mouth 2 (Two) Times a Day With Meals., Disp: , Rfl:     sildenafil (VIAGRA) 100 MG tablet, Take 1 tablet by mouth Take As Directed. 1-4 hours before sexual activity, Disp: 30 tablet, Rfl: 5    simvastatin (ZOCOR) 40 MG tablet, Take 1 tablet by mouth Every Night., Disp: , Rfl:     tamsulosin (FLOMAX) 0.4 MG capsule 24 hr capsule, Take 2 capsules by mouth Daily., Disp: , Rfl:        Objective:      Vitals:    08/25/23 1122   BP: 128/70   Pulse: 75   SpO2: 97%     Vitals and nursing note reviewed.    Constitutional:       General: Not in acute distress.     Appearance: Not in distress.   Neck:      Vascular: No JVD or JVR. JVD normal.   Pulmonary:      Effort: Pulmonary effort is normal.      Breath sounds: Normal breath sounds.   Cardiovascular:      Normal rate. Irregularly irregular rhythm.      Murmurs: There is no murmur.      No gallop.  No rub.   Pulses:     Intact distal pulses.   Edema:     Peripheral edema absent.   Skin:     General: Skin is warm and dry.   Neurological:      Mental Status: Alert, oriented to person, place, and time and oriented to person, place and time.     Lab Review:         ECG 12 Lead    Date/Time: 8/25/2023 11:36 AM  Performed by: Aris Mayfield MD  Authorized by: Aris Mayfield MD   Comparison: compared with previous ECG from 7/19/2023  Comparison to previous ECG: atrial fibrillation has replaced sinus rhythm.  Rhythm: atrial fibrillation  BPM: 75  Other findings: poor R wave progression    Clinical impression: abnormal EKG      Interpretation Summary from Zio patch on 07/19/2023.         An abnormal monitor study.    77% burden of atrial fibrillation, with average ventricular rate of 87 bpm (range ).  Patient remained in sinus rhythm from time the device was placed on 7/19/23, until approximately 8 PM on 7/22/2023.  He then remained in atrial fibrillation until the device was removed on 8/2/2023.    Patient triggered the device once, but did not report symptoms.  This was at a time of rate-controlled atrial fibrillation on 7/30/2023.        Assessment/Plan:     Problem List Items Addressed This Visit (all established and stable, except for otherwise noted)         Cardiac and Vasculature    Essential (primary) hypertension: Well-controlled    Persistent atrial fibrillation - Primary (as noted above and below, did experience some symptomatic improvement, primarily with increased energy, when in sinus rhythm    Relevant Orders    Ambulatory Referral to Cardiac  Electrophysiology       Coag and Thromboembolic    Chronic anticoagulation: currently tolerating Eliquis but has had hematuria       Sleep    Obstructive sleep apnea: wearing CPAP         Recommendations/plans:    Mr. Humberto Auguste does report today that in retrospect, he did feel improved with regards to better energy, when he was in sinus rhythm after cardioversion, though it only lasted for 3 days. We spent the bulk of the visit discussing potential options. We discussed potentially using antiarrhythmic medicines versus ablation. After discussion, I have placed a referral to Dr. Gotti, who will hopefully see in the near future to discuss potential ablation as restoration of sinus rhythm. In the meantime, he knows to continue Eliquis without interruption. Timing of all of the above will also depend a little bit on when he has a potential knee surgery planned as well as a trip that he does not want to miss. We discussed that we could try antiarrhythmic medications and other cardioversion as a bridge to a potential ablation later on if need be, based on scheduling demands. For now, we will wait for his consultation with Dr. Gotti before making any other plans or decisions.     Otherwise, we did discuss Watchman as an alternative as well to Eliquis since he initially had gross hematuria; however, that issue has currently resolved, and he is tolerating Eliquis reasonably well at present. He understands that if he elects to go with both an ablation and Watchman, that typically the ablation would be done first and Watchman 3 months later. I told him I would be happy to do watchman for him if ultimately he decides to go that route after ablation is completed).    In the meantime, no other changes recommended, and referral has been placed to Dr. Gotti.      Transcribed from ambient dictation for Aris Mayfield MD by Barb Sosa.  08/25/23   15:25 CDT    Patient or patient representative verbalized consent to the visit  recording.

## 2023-11-17 ENCOUNTER — OFFICE VISIT (OUTPATIENT)
Dept: CARDIOLOGY | Facility: CLINIC | Age: 73
End: 2023-11-17
Payer: MEDICARE

## 2023-11-17 VITALS
OXYGEN SATURATION: 98 % | HEART RATE: 81 BPM | HEIGHT: 75 IN | SYSTOLIC BLOOD PRESSURE: 130 MMHG | BODY MASS INDEX: 34.82 KG/M2 | WEIGHT: 280 LBS | DIASTOLIC BLOOD PRESSURE: 82 MMHG

## 2023-11-17 DIAGNOSIS — I48.19 PERSISTENT ATRIAL FIBRILLATION: ICD-10-CM

## 2023-11-17 DIAGNOSIS — R31.0 GROSS HEMATURIA: ICD-10-CM

## 2023-11-17 DIAGNOSIS — G47.33 OBSTRUCTIVE SLEEP APNEA: ICD-10-CM

## 2023-11-17 DIAGNOSIS — I48.91 ATRIAL FIBRILLATION, UNSPECIFIED TYPE: Primary | ICD-10-CM

## 2023-11-17 DIAGNOSIS — Z79.01 CHRONIC ANTICOAGULATION: ICD-10-CM

## 2023-11-17 NOTE — PROGRESS NOTES
[facility] CONSULT NOTE    Referring Provider: Dr. Aris Mayfield     Reason for Consultation: potential ablation     Chief complaint:   Chief Complaint   Patient presents with    Atrial Fibrillation     3 MO FU        Subjective .     EP history:   Persistent AF  -EECV 7-19-23  -Holter 77% afib    Cardiology history:   HTN  HLP    Medical history:  Hematuria  ROSCOE with CPAP   OA/chronic knee pain   Thyroid nodules   Diabetes Mellitus  CKD stage 3a  BPH     History of present illness:  Humberto Auguste Jr. is a 73 y.o. male with history of incidentally noted afib during hosptialization for hematuria who is referred to EP clinic at the request of Dr. Aris Mayfield. In May, he presented to the ER with gross hematuria and EKG showed afib with RVR of unknown duration. He was rate controlled and anticoagulated with planned outpatient cardioversion performed in July by Dr. Mayfield, which was fortunately successful. At the time of the May discharge, he was sent home on Diltiazem and a Holter. (Eliquis was not started until urology cleared). He has since tolerated the Eliquis well without recurrent hematuria.     His monitor returned 100% afib with mean heart rate of 86 on diltiazem. Cardioversion was performed July 19th successfully and he wore a second monitor, which demonstrated that he maintained sinus rhythm from the 19th-22nd. He has been in persistent afib likely since. Overall, he is asymptomatic and denies any subjective tachycardia or palpitations. He has not had any presyncope or syncopal episodes.     He states that he is getting his knee replaced by Dr. Hunt December 11th. While he did feel better in sinus and ultimately wants to get afib treated, he would prefer to have knee surgery first.       History  Past Medical History:   Diagnosis Date    Acute UTI (urinary tract infection) 5/29/2023    Arthritis     Benign prostatic hyperplasia 2/1/12    Diabetes mellitus     Elevated  cholesterol     Enlarged prostate     Erectile dysfunction 17    Hypertension     Rotator cuff disorder, right     Sleep apnea    ,   Past Surgical History:   Procedure Laterality Date    COLONOSCOPY  2016    polyp removed from 95 cm.a long [id number] colon not allowing mitchell safr colonoscopy    COLONOSCOPY N/A 2021    Procedure: COLONOSCOPY WITH ANESTHESIA;  Surgeon: Rick Dixon DO;  Location:  [facility];  Service: Gastroenterology;  Laterality: N/A;  pre op: hx polyps  post op:normal  PCP: Feliciano Kinsey MD    CYSTOSCOPY TRANSURETHRAL RESECTION OF PROSTATE N/A 2022    Procedure: TRANSURETHRAL RESECTION OF PROSTATE;  Surgeon: Camron Bowden MD;  Location:  [facility];  Service: Urology;  Laterality: N/A;    CYSTOSCOPY WITH CLOT EVACUATION N/A 2023    Procedure: CYSTOSCOPY WITH CLOT EVACUATION;  Surgeon: Camron Bowden MD;  Location:  [facility];  Service: Urology;  Laterality: N/A;    HERNIA REPAIR      PROSTATE SURGERY  22    SHOULDER ARTHROSCOPY W/ ROTATOR CUFF REPAIR Right 2020    Procedure: RIGHT SHOULDER  ROTATOR CUFF REPAIR, SUBACROMIAL DECOMPRESSION;  Surgeon: Alphonso Lundberg MD;  Location:  [facility];  Service: Orthopedics;  Laterality: Right;    VARICOCELE EXCISION  2/15/82    VARICOSE VEIN SURGERY     ,   Family History   Problem Relation Age of Onset    No Known Problems Mother     Diabetes Father         Type 2 diabetic; hes 91    Cancer Maternal Grandfather          in  of prostrate cancer; he was 81    Cancer Paternal Grandmother          of lung cancer ; he was 67    Diabetes Paternal Grandfather           of pneumonia; was 70; type 2 diabetes    Cancer Paternal Uncle         He  of leukemia; ; he was 49    Colon cancer Neg Hx     Colon polyps Neg Hx     Esophageal cancer Neg Hx    ,   Social History     Tobacco Use    Smoking status: Former     Packs/day: 1.00     Years: 40.00     Additional pack  "years: 0.00     Total pack years: 40.00     Types: Cigarettes     Start date: 1967     Quit date: 12/15/2015     Years since quittin.9    Smokeless tobacco: Never    Tobacco comments:     Never again   Vaping Use    Vaping Use: Never used   Substance Use Topics    Alcohol use: No    Drug use: Never   ,     Medications      Current Outpatient Medications:     apixaban (ELIQUIS) 5 MG tablet tablet, Take 1 tablet by mouth 2 (Two) Times a Day., Disp: 60 tablet, Rfl: 11    Cod Liver Oil 1000 MG capsule, Take 2 capsules by mouth Daily., Disp: , Rfl:     dilTIAZem CD (Cartia XT) 240 MG 24 hr capsule, Take 1 capsule by mouth Daily., Disp: 30 capsule, Rfl: 11    furosemide (LASIX) 40 MG tablet, Take 1 tablet by mouth Daily As Needed., Disp: , Rfl:     lisinopril (PRINIVIL,ZESTRIL) 40 MG tablet, Take 10 mg by mouth Daily., Disp: , Rfl:     metFORMIN (GLUCOPHAGE) 500 MG tablet, Take 1 tablet by mouth 2 (Two) Times a Day With Meals., Disp: , Rfl:     sildenafil (VIAGRA) 100 MG tablet, Take 1 tablet by mouth Take As Directed. 1-4 hours before sexual activity, Disp: 30 tablet, Rfl: 5    simvastatin (ZOCOR) 40 MG tablet, Take 1 tablet by mouth Every Night., Disp: , Rfl:     tamsulosin (FLOMAX) 0.4 MG capsule 24 hr capsule, Take 2 capsules by mouth Daily., Disp: , Rfl:     Allergies:  Percocet [oxycodone-acetaminophen]    Review of Systems  Review of Systems   Constitutional: Negative.   HENT: Negative.     Eyes: Negative.    Cardiovascular: Negative.    Respiratory: Negative.     Endocrine: Negative.    Hematologic/Lymphatic: Negative.    Skin: Negative.    Musculoskeletal:  Positive for joint pain.   Gastrointestinal: Negative.    Genitourinary: Negative.    Neurological: Negative.    Psychiatric/Behavioral: Negative.         Objective     Physical Exam:  Visit Vitals  /82   Pulse 81   Ht 190.5 cm (75\")   Wt 127 kg (280 lb)   SpO2 98%   BMI 35.00 kg/m²       Vitals reviewed.   Constitutional:       Appearance: " Healthy appearance. Not in distress.   Eyes:      Extraocular Movements: Extraocular movements intact.      Conjunctiva/sclera: Conjunctivae normal.      Pupils: Pupils are equal, round, and reactive to light.   HENT:      Head: Normocephalic and atraumatic.      Nose: Nose normal.    Mouth/Throat:      Lips: Pink.      Mouth: Mucous membranes are moist.      Pharynx: Oropharynx is clear.   Neck:      Vascular: No carotid bruit or JVD. JVD normal.   Pulmonary:      Effort: Pulmonary effort is normal.      Breath sounds: Normal breath sounds.   Chest:      Chest wall: Not tender to palpatation.   Cardiovascular:      PMI at left midclavicular line. Normal rate. Irregularly irregular rhythm. Normal S1. Normal S2.       Murmurs: There is no murmur.      No gallop.  No rub.   Pulses:     Radial: 2+ bilaterally.  Edema:     Peripheral edema absent.   Abdominal:      General: Bowel sounds are normal.      Palpations: Abdomen is soft.   Musculoskeletal: Normal range of motion.      Extremities: No clubbing present.     Cervical back: Normal range of motion. Skin:     General: Skin is warm and dry.   Neurological:      General: No focal deficit present.      Mental Status: Alert and oriented to person, place, and time.   Psychiatric:         Attention and Perception: Attention normal.         Mood and Affect: Affect normal.         Speech: Speech normal.         Behavior: Behavior normal.         Cognition and Memory: Cognition normal.         Results Review:   I reviewed the patient's new clinical results.    Holter Monitor - 72 Hour Up To 15 Days (07/19/2023 10:45)           An abnormal monitor study.    77% burden of atrial fibrillation, with average ventricular rate of 87 bpm (range ).  Patient remained in sinus rhythm from time the device was placed on 7/19/23, until approximately 8 PM on 7/22/2023.  He then remained in atrial fibrillation until the device was removed on 8/2/2023.    Patient triggered the device  once, but did not report symptoms.  This was at a time of rate-controlled atrial fibrillation on 7/30/2023.     Adult Transthoracic Echo Complete W/ Cont if Necessary Per Protocol (05/30/2023 09:43)     Left ventricular systolic function is normal. Left ventricular ejection fraction appears to be 56 - 60%.    Estimated right ventricular systolic pressure from tricuspid regurgitation is normal (<35 mmHg).    The right ventricular cavity is dilated (though not seen well enough to quantify severity of dilation), with normal systolic function.    No significant (greater than mild) valvular pathology.    Rhythm is atrial fibrillation.    No prior studies available for comparison.           CBC:  Lab Results - Last 18 Months   Lab Units 10/11/23  0838 07/17/23  1010   WBC K/uL  --  5.1   HEMOGLOBIN g/dL  --  12.4*   HEMATOCRIT %  --  39.6*   PLATELETS K/uL  --  199   IRON ug/dL 74  --       BMP/CMP:  Lab Results - Last 18 Months   Lab Units 05/31/23  0341 05/25/23  0835 07/05/22  0935   SODIUM mmol/L 141   < > 142   POTASSIUM mmol/L 4.1   < > 4.3   CHLORIDE mmol/L 105   < > 105   TOTAL CO2 mmol/L  --   --  29   CO2 mmol/L 27.0   < >  --    GLUCOSE mg/dL 118*   < > 133*   BUN mg/dL 37*   < > 24*   CREATININE mg/dL 1.36*   < > 1.2   EGFR IF NONAFRICN AM   --   --  60*   EGFR IF AFRICN AM   --   --  >59   CALCIUM mg/dL 8.1*   < > 9.1    < > = values in this interval not displayed.       THYROID:  Lab Results - Last 18 Months   Lab Units 06/05/23  1305   TSH uIU/mL 0.869   FREE T4 ng/dL 1.19       Assessment & Plan     Persistent afib: CHADsVASc score is 3. He is very committed to having knee replacement surgery as scheduled Dec 11. Discussed case with Dr. Gotti by phone today. This is reasonable since if we were to pursue ablation now, he would require 90 days of anticoagulation post op after ablation, postponing his orthopedic surgery.    -Continue Eliquis up to surgery and restart as soon as possible to do so. Discussed  that while off of anticoagulation, there is some increased risk of CVA, but he understands that risk.     Hematuria: given that he previously had hematuria, he has discussed potential Watchman with Dr. Mayfield. This would be able to be performed after 90 days anticoagulation post ablation with a 6 week staged Watchman performed later in February.       Further orders per Dr. Gotti    Thank you for asking us to follow this patient with you.       Electronically signed by CARLTON Gomez, 11/17/23, 11:48 AM CST.

## 2023-11-19 ENCOUNTER — PREP FOR SURGERY (OUTPATIENT)
Dept: OTHER | Facility: HOSPITAL | Age: 73
End: 2023-11-19
Payer: MEDICARE

## 2023-11-19 DIAGNOSIS — I48.19 PERSISTENT ATRIAL FIBRILLATION: Primary | ICD-10-CM

## 2023-11-19 RX ORDER — SODIUM CHLORIDE 9 MG/ML
40 INJECTION, SOLUTION INTRAVENOUS AS NEEDED
OUTPATIENT
Start: 2023-11-19

## 2023-11-19 RX ORDER — SODIUM CHLORIDE 0.9 % (FLUSH) 0.9 %
10 SYRINGE (ML) INJECTION AS NEEDED
OUTPATIENT
Start: 2023-11-19

## 2023-11-19 RX ORDER — SODIUM CHLORIDE 0.9 % (FLUSH) 0.9 %
10 SYRINGE (ML) INJECTION EVERY 12 HOURS SCHEDULED
OUTPATIENT
Start: 2023-11-19

## 2023-12-01 ENCOUNTER — PREP FOR SURGERY (OUTPATIENT)
Dept: OTHER | Facility: HOSPITAL | Age: 73
End: 2023-12-01
Payer: MEDICARE

## 2023-12-01 ENCOUNTER — OFFICE VISIT (OUTPATIENT)
Dept: CARDIOLOGY | Facility: CLINIC | Age: 73
End: 2023-12-01
Payer: MEDICARE

## 2023-12-01 VITALS
DIASTOLIC BLOOD PRESSURE: 80 MMHG | HEART RATE: 79 BPM | BODY MASS INDEX: 35.56 KG/M2 | HEIGHT: 75 IN | OXYGEN SATURATION: 97 % | SYSTOLIC BLOOD PRESSURE: 129 MMHG | WEIGHT: 286 LBS

## 2023-12-01 DIAGNOSIS — I48.19 PERSISTENT ATRIAL FIBRILLATION: Primary | ICD-10-CM

## 2023-12-01 DIAGNOSIS — Z01.810 PREOP CARDIOVASCULAR EXAM: ICD-10-CM

## 2023-12-01 DIAGNOSIS — I10 ESSENTIAL (PRIMARY) HYPERTENSION: ICD-10-CM

## 2023-12-01 DIAGNOSIS — Z79.01 CHRONIC ANTICOAGULATION: ICD-10-CM

## 2024-01-12 ENCOUNTER — TELEPHONE (OUTPATIENT)
Dept: CARDIOLOGY | Facility: CLINIC | Age: 74
End: 2024-01-12
Payer: MEDICARE

## 2024-01-12 NOTE — TELEPHONE ENCOUNTER
I spoke with Mr. Auguste about his upcoming ablation.  He was well informed about the procedure from prior discussion with Dr. Gotti.  We discussed the procedure at length including risks, anesthesia, intra-op procedures, recovery, bedrest, sheath removal, discharge criteria, and normal post-procedure expectations.  I answered a few remaining questions. Mr. Auguste verbalized understanding and he is ready to proceed.       Amee Arnold RN

## 2024-01-12 NOTE — DISCHARGE INSTRUCTIONS
Post-Atrial Fibrillation Ablation Instructions    ACTIVITY    Avoid driving, operating machinery, or alcohol consumption for 24 hours after receiving sedation. In addition, avoid signing legal documents or participating in legal proceedings.    You may resume normal activities after 24 hours except for the following:    Avoid heavy lifting (no more than 10 pounds) and vigorous activities for a minimum of 7 days. This includes activities such as jogging, exercise classes, sports activities, etc.    You may resume walking and other normal activities.    Avoid sitting more than 2 consecutive hours during waking hours for the first 3 days. If you are traveling, stop for brief (5 minutes) walks every two hours.    MEDICATIONS  Continue Eliquis at your usual dose this evening. Do not stop this medication without consulting with your cardiologist.    2.   {Antacid:73108}    MEDICAL FOLLOW UP   EP clinic follow up with Dr. Gotti on 2/16/2024..    PLEASE NOTIFY US IF YOU DEVELOP    Fever of 101 or greater during your first few days at home    The occurrence of a new, persistent cough or unexplained shortness of breath.    A groin bruise may be expected. Initial soreness and discomfort should improve over the first few days. If you continue to have discomfort or if bruising is excessive, please call us.    Patients often experience palpitations and even atrial fibrillation during the healing period.    Please call if you have an episode of atrial fibrillation accompanied by fast heart rate greater than 120 beats per minute for extended period or Atrial Fibrillation that last longer than 1-2 days.    Mild chest soreness is common and may be treated with Tylenol, Advil, or Motrin.  This soreness typically worsens when taking deep breaths.  If you notice these symptoms, start one of these medications according to the package directions and continue for 2-3 days. If your symptoms are not relieved or become severe, please call us.       REASONS TO GO TO THE EMERGENCY ROOM FOR EVALUATION:   Severe chest pain  Significant shortness of breath at rest  Near passing out or passing out episodes soon after your ablation  Symptoms of chest pain or shortness of breath associated with low blood pressure (reading less than 90 for the top number / systolic blood pressure)  Brisk bleeding or significant swelling from the groin where IVs were placed  Any concerns that an emergent or life threatening complication is occurring    If you have a clinical questions between the hours of 8am and 4pm, Monday - Friday please call the office and let us know your concern. Please leave a message if your call is not an emergency.    For emergencies, please go for evaluation at your nearest emergency department.    If you have a [employer] account, this an excellent way to communicate.  [employer] messages are checked Monday through Friday. Please allow 24-36 hours for your message to be answered.

## 2024-01-15 ENCOUNTER — ANESTHESIA (OUTPATIENT)
Dept: CARDIOLOGY | Facility: HOSPITAL | Age: 74
End: 2024-01-15
Payer: MEDICARE

## 2024-01-15 ENCOUNTER — ANESTHESIA EVENT (OUTPATIENT)
Dept: CARDIOLOGY | Facility: HOSPITAL | Age: 74
End: 2024-01-15
Payer: MEDICARE

## 2024-01-15 ENCOUNTER — HOSPITAL ENCOUNTER (OUTPATIENT)
Facility: HOSPITAL | Age: 74
Setting detail: HOSPITAL OUTPATIENT SURGERY
Discharge: HOME | End: 2024-01-15
Attending: STUDENT IN AN ORGANIZED HEALTH CARE EDUCATION/TRAINING PROGRAM | Admitting: STUDENT IN AN ORGANIZED HEALTH CARE EDUCATION/TRAINING PROGRAM
Payer: MEDICARE

## 2024-01-15 VITALS
HEIGHT: 75 IN | SYSTOLIC BLOOD PRESSURE: 146 MMHG | BODY MASS INDEX: 34.69 KG/M2 | DIASTOLIC BLOOD PRESSURE: 77 MMHG | TEMPERATURE: 96.9 F | RESPIRATION RATE: 17 BRPM | WEIGHT: 279 LBS | HEART RATE: 88 BPM | OXYGEN SATURATION: 97 %

## 2024-01-15 DIAGNOSIS — I48.19 PERSISTENT ATRIAL FIBRILLATION: ICD-10-CM

## 2024-01-15 LAB
ANION GAP SERPL CALCULATED.3IONS-SCNC: 11 MMOL/L (ref 5–15)
BASOPHILS # BLD AUTO: 0.03 10*3/MM3 (ref 0–0.2)
BASOPHILS NFR BLD AUTO: 0.5 % (ref 0–1.5)
BUN SERPL-MCNC: 31 MG/DL (ref 8–23)
BUN/CREAT SERPL: 27 (ref 7–25)
CALCIUM SPEC-SCNC: 8.7 MG/DL (ref 8.6–10.5)
CHLORIDE SERPL-SCNC: 105 MMOL/L (ref 98–107)
CO2 SERPL-SCNC: 27 MMOL/L (ref 22–29)
CREAT SERPL-MCNC: 1.15 MG/DL (ref 0.76–1.27)
DEPRECATED RDW RBC AUTO: 44 FL (ref 37–54)
EGFRCR SERPLBLD CKD-EPI 2021: 67.6 ML/MIN/1.73
EOSINOPHIL # BLD AUTO: 0.4 10*3/MM3 (ref 0–0.4)
EOSINOPHIL NFR BLD AUTO: 6.4 % (ref 0.3–6.2)
ERYTHROCYTE [DISTWIDTH] IN BLOOD BY AUTOMATED COUNT: 12.8 % (ref 12.3–15.4)
GLUCOSE SERPL-MCNC: 113 MG/DL (ref 65–99)
HCT VFR BLD AUTO: 35.7 % (ref 37.5–51)
HGB BLD-MCNC: 10.8 G/DL (ref 13–17.7)
IMM GRANULOCYTES # BLD AUTO: 0.01 10*3/MM3 (ref 0–0.05)
IMM GRANULOCYTES NFR BLD AUTO: 0.2 % (ref 0–0.5)
INR PPP: 1.26 (ref 0.91–1.09)
LYMPHOCYTES # BLD AUTO: 1.3 10*3/MM3 (ref 0.7–3.1)
LYMPHOCYTES NFR BLD AUTO: 20.7 % (ref 19.6–45.3)
MCH RBC QN AUTO: 28.6 PG (ref 26.6–33)
MCHC RBC AUTO-ENTMCNC: 30.3 G/DL (ref 31.5–35.7)
MCV RBC AUTO: 94.7 FL (ref 79–97)
MONOCYTES # BLD AUTO: 0.72 10*3/MM3 (ref 0.1–0.9)
MONOCYTES NFR BLD AUTO: 11.5 % (ref 5–12)
NEUTROPHILS NFR BLD AUTO: 3.81 10*3/MM3 (ref 1.7–7)
NEUTROPHILS NFR BLD AUTO: 60.7 % (ref 42.7–76)
NRBC BLD AUTO-RTO: 0 /100 WBC (ref 0–0.2)
PLATELET # BLD AUTO: 205 10*3/MM3 (ref 140–450)
PMV BLD AUTO: 9.9 FL (ref 6–12)
POTASSIUM SERPL-SCNC: 4.2 MMOL/L (ref 3.5–5.2)
PROTHROMBIN TIME: 15.9 SECONDS (ref 11.8–14.8)
RBC # BLD AUTO: 3.77 10*6/MM3 (ref 4.14–5.8)
SODIUM SERPL-SCNC: 143 MMOL/L (ref 136–145)
WBC NRBC COR # BLD AUTO: 6.27 10*3/MM3 (ref 3.4–10.8)

## 2024-01-15 PROCEDURE — 93005 ELECTROCARDIOGRAM TRACING: CPT | Performed by: STUDENT IN AN ORGANIZED HEALTH CARE EDUCATION/TRAINING PROGRAM

## 2024-01-15 PROCEDURE — 85025 COMPLETE CBC W/AUTO DIFF WBC: CPT | Performed by: STUDENT IN AN ORGANIZED HEALTH CARE EDUCATION/TRAINING PROGRAM

## 2024-01-15 PROCEDURE — 85610 PROTHROMBIN TIME: CPT | Performed by: STUDENT IN AN ORGANIZED HEALTH CARE EDUCATION/TRAINING PROGRAM

## 2024-01-15 PROCEDURE — 80048 BASIC METABOLIC PNL TOTAL CA: CPT | Performed by: STUDENT IN AN ORGANIZED HEALTH CARE EDUCATION/TRAINING PROGRAM

## 2024-01-15 RX ORDER — SODIUM CHLORIDE 0.9 % (FLUSH) 0.9 %
10 SYRINGE (ML) INJECTION EVERY 12 HOURS SCHEDULED
Status: DISCONTINUED | OUTPATIENT
Start: 2024-01-15 | End: 2024-01-15 | Stop reason: HOSPADM

## 2024-01-15 RX ORDER — SODIUM CHLORIDE 0.9 % (FLUSH) 0.9 %
10 SYRINGE (ML) INJECTION AS NEEDED
Status: DISCONTINUED | OUTPATIENT
Start: 2024-01-15 | End: 2024-01-15 | Stop reason: HOSPADM

## 2024-01-15 RX ORDER — SODIUM CHLORIDE 9 MG/ML
40 INJECTION, SOLUTION INTRAVENOUS AS NEEDED
Status: DISCONTINUED | OUTPATIENT
Start: 2024-01-15 | End: 2024-01-15 | Stop reason: HOSPADM

## 2024-01-15 NOTE — ANESTHESIA PREPROCEDURE EVALUATION
Anesthesia Evaluation     Patient summary reviewed   no history of anesthetic complications:   NPO Solid Status: > 8 hours  NPO Liquid Status: > 8 hours           Airway   Mallampati: II  TM distance: >3 FB  Neck ROM: full  No difficulty expected  Dental      Pulmonary    (+) a smoker Former,sleep apnea  Cardiovascular   Exercise tolerance: good (4-7 METS) (Limited by post knee replacement recover, recently performed 12/11/23)    PT is on anticoagulation therapy    (+) hypertension well controlled, dysrhythmias Atrial Fib, hyperlipidemia    ROS comment: Echo:  ·  Left ventricular systolic function is normal. Left ventricular ejection fraction appears to be 56 - 60%.  ·  Estimated right ventricular systolic pressure from tricuspid regurgitation is normal (<35 mmHg).  ·  The right ventricular cavity is dilated (though not seen well enough to quantify severity of dilation), with normal systolic function.  ·  No significant (greater than mild) valvular pathology.  ·  Rhythm is atrial fibrillation.  ·  No prior studies available for comparison.      Neuro/Psych- negative ROS  (-) seizures, TIA, CVA  GI/Hepatic/Renal/Endo    (+) renal disease-, diabetes mellitus well controlled  (-) liver disease    Musculoskeletal     Abdominal    Substance History - negative use     OB/GYN          Other   arthritis, blood dyscrasia anemia,                 Anesthesia Plan    ASA 3     general     (Patient reports taking sildenafil at 1800 last pm. Will discuss with Dr. Gotti, given significant risk of  intraoperative hypotension)  intravenous induction     Anesthetic plan, risks, benefits, and alternatives have been provided, discussed and informed consent has been obtained with: patient.    Plan discussed with CRNA.    CODE STATUS:

## 2024-01-15 NOTE — H&P (VIEW-ONLY)
Chief Complaint  Persistent atrial fibrillation    Subjective        History of Present Illness  EP history:   Persistent AF  -2023     Cardiology history:   Hypertension  Hyperlipidemia     Medical history:  Hematuria  ROSCOE with CPAP   OA/chronic knee pain   Thyroid nodules   Diabetes Mellitus  CKD stage 3a  BPH     Humberto Auguste Jr. is a 73 y.o. male with past medical history as above who presents to the hospital for outpatient EP study and ablation for treatment of persistent atrial fibrillation.  He has a history of persistent atrial fibrillation.  He underwent direct-current cardioversion in July which was followed by early recurrence of atrial fibrillation.  He does have symptoms of fatigue and shortness of breath with exertion.  This has been potentially attributed to his atrial fibrillation.  As such, we discussed the risks and benefits of ablation and he chose to proceed.    Since the time of the last clinic visit, denies significant change in symptoms.  Denies missing any doses of his medications.  No new ER visits or hospitalizations.    Family History:  family history includes Cancer in his maternal grandfather, paternal grandmother, and paternal uncle; Diabetes in his father and paternal grandfather; No Known Problems in his mother.    Social History:  Social History     Tobacco Use    Smoking status: Former     Packs/day: 1.00     Years: 40.00     Additional pack years: 0.00     Total pack years: 40.00     Types: Cigarettes     Start date: 1967     Quit date: 12/15/2015     Years since quittin.0    Smokeless tobacco: Never    Tobacco comments:     Never again   Vaping Use    Vaping Use: Never used   Substance Use Topics    Alcohol use: No    Drug use: Never       Allergies:  Percocet [oxycodone-acetaminophen]      Objective   Vital Signs:  There were no vitals taken for this visit.  Estimated body mass index is 35.75 kg/m² as calculated from the following:    Height as of 23:  "190.5 cm (75\").    Weight as of 12/1/23: 130 kg (286 lb).      Physical Exam  Vitals reviewed.   Constitutional:       Appearance: Normal appearance.   Cardiovascular:      Rate and Rhythm: Normal rate. Rhythm irregular.      Pulses: Normal pulses.      Heart sounds: Normal heart sounds.   Pulmonary:      Effort: Pulmonary effort is normal.      Breath sounds: Normal breath sounds.   Musculoskeletal:         General: No swelling.   Neurological:      Mental Status: He is alert and oriented to person, place, and time.   Psychiatric:         Mood and Affect: Mood normal.         Judgment: Judgment normal.          Result Review :  Labs were reviewed with relevant findings as follows: No relevant findings               Assessment and Plan   Assessment/plan:  Humberto Auguste Jr. is a 73 y.o. male who presents to the hospital for outpatient EP study and ablation for treatment of persistent atrial fibrillation.  There are no apparent contraindications to proceeding and he is medically appropriate for outpatient management with this procedure.      I have discussed risks, benefits, and alternatives of an electrophysiology study and ablation for atrial fibrillation with the patient.  Alternatives discussed include continued observation and medical management.  An electrophysiology study with ablation is an inherently high risk procedure with possible complications that include but are not limited to vascular access complications, internal bleeding, tamponade requiring pericardiocentesis or cardiac surgery, stroke, MI, embolism, myocardial injury, injury to the normal conduction system requiring a pacemaker, pulmonary vein stenosis, atrio-esophageal fistula, and ultimately death.  We also discussed that given the nature of the procedure, therapeutic efficacy can be variable.  Possibilities of recurrent arrhythmias and the possible need for additional procedures and/or medical therapy was discussed. Questions asked were " appropriately answered.  No guarantees were made or implied.     Despite this, they would still like to proceed.    Plan:   - Proceed with EP study and ablation for treatment of persistent atrial fibrillation           Part of this note may be an electronic transcription/translation of spoken language to printed text using the Dragon Dictation System.

## 2024-01-15 NOTE — NURSING NOTE
Patient evaluation done by anesthesia for preop. Anesthesia talked with dr luo and patient procedure was cancelled due to taking viagra last night. Patient unaware that he could not take his viagra within 24 to 48 hours prior to general anesthesia. Anesthesia educated patient that the viagra could possibly lower his bp during his procedure too much. Anesthesia said for patient safety this is the best recourse. Patient to be rescheduled for his ablation.

## 2024-01-16 ENCOUNTER — PREP FOR SURGERY (OUTPATIENT)
Dept: OTHER | Facility: HOSPITAL | Age: 74
End: 2024-01-16
Payer: MEDICARE

## 2024-01-16 DIAGNOSIS — I48.19 PERSISTENT ATRIAL FIBRILLATION: Primary | ICD-10-CM

## 2024-01-16 RX ORDER — SODIUM CHLORIDE 0.9 % (FLUSH) 0.9 %
10 SYRINGE (ML) INJECTION AS NEEDED
Status: CANCELLED | OUTPATIENT
Start: 2024-01-16

## 2024-01-16 RX ORDER — SODIUM CHLORIDE 0.9 % (FLUSH) 0.9 %
10 SYRINGE (ML) INJECTION EVERY 12 HOURS SCHEDULED
Status: CANCELLED | OUTPATIENT
Start: 2024-01-16

## 2024-01-16 RX ORDER — SODIUM CHLORIDE 9 MG/ML
40 INJECTION, SOLUTION INTRAVENOUS AS NEEDED
Status: CANCELLED | OUTPATIENT
Start: 2024-01-16

## 2024-01-17 ENCOUNTER — HOSPITAL ENCOUNTER (OUTPATIENT)
Facility: HOSPITAL | Age: 74
Setting detail: HOSPITAL OUTPATIENT SURGERY
Discharge: HOME | End: 2024-01-17
Attending: STUDENT IN AN ORGANIZED HEALTH CARE EDUCATION/TRAINING PROGRAM | Admitting: STUDENT IN AN ORGANIZED HEALTH CARE EDUCATION/TRAINING PROGRAM
Payer: MEDICARE

## 2024-01-17 ENCOUNTER — ANESTHESIA (OUTPATIENT)
Dept: CARDIOLOGY | Facility: HOSPITAL | Age: 74
End: 2024-01-17
Payer: MEDICARE

## 2024-01-17 ENCOUNTER — ANESTHESIA EVENT (OUTPATIENT)
Dept: CARDIOLOGY | Facility: HOSPITAL | Age: 74
End: 2024-01-17
Payer: MEDICARE

## 2024-01-17 VITALS
TEMPERATURE: 96.7 F | BODY MASS INDEX: 34.91 KG/M2 | HEART RATE: 65 BPM | HEIGHT: 75 IN | WEIGHT: 280.8 LBS | RESPIRATION RATE: 16 BRPM | SYSTOLIC BLOOD PRESSURE: 131 MMHG | OXYGEN SATURATION: 92 % | DIASTOLIC BLOOD PRESSURE: 79 MMHG

## 2024-01-17 DIAGNOSIS — I48.19 PERSISTENT ATRIAL FIBRILLATION: ICD-10-CM

## 2024-01-17 LAB
ACT BLD: 304 SECONDS (ref 82–152)
ACT BLD: 363 SECONDS (ref 82–152)
ACT BLD: 385 SECONDS (ref 82–152)

## 2024-01-17 PROCEDURE — C1894 INTRO/SHEATH, NON-LASER: HCPCS | Performed by: STUDENT IN AN ORGANIZED HEALTH CARE EDUCATION/TRAINING PROGRAM

## 2024-01-17 PROCEDURE — 93622 COMP EP EVAL L VENTR PAC&REC: CPT | Performed by: STUDENT IN AN ORGANIZED HEALTH CARE EDUCATION/TRAINING PROGRAM

## 2024-01-17 PROCEDURE — C1730 CATH, EP, 19 OR FEW ELECT: HCPCS | Performed by: STUDENT IN AN ORGANIZED HEALTH CARE EDUCATION/TRAINING PROGRAM

## 2024-01-17 PROCEDURE — 93656 COMPRE EP EVAL ABLTJ ATR FIB: CPT | Performed by: STUDENT IN AN ORGANIZED HEALTH CARE EDUCATION/TRAINING PROGRAM

## 2024-01-17 PROCEDURE — 25010000002 VASOPRESSIN 20 UNIT/ML SOLUTION

## 2024-01-17 PROCEDURE — 25810000003 SODIUM CHLORIDE 0.9 % SOLUTION 500 ML FLEX CONT: Performed by: STUDENT IN AN ORGANIZED HEALTH CARE EDUCATION/TRAINING PROGRAM

## 2024-01-17 PROCEDURE — 25010000002 ONDANSETRON PER 1 MG

## 2024-01-17 PROCEDURE — 93010 ELECTROCARDIOGRAM REPORT: CPT | Performed by: INTERNAL MEDICINE

## 2024-01-17 PROCEDURE — 25010000002 FENTANYL CITRATE (PF) 100 MCG/2ML SOLUTION

## 2024-01-17 PROCEDURE — 93655 ICAR CATH ABLTJ DSCRT ARRHYT: CPT | Performed by: STUDENT IN AN ORGANIZED HEALTH CARE EDUCATION/TRAINING PROGRAM

## 2024-01-17 PROCEDURE — C1732 CATH, EP, DIAG/ABL, 3D/VECT: HCPCS | Performed by: STUDENT IN AN ORGANIZED HEALTH CARE EDUCATION/TRAINING PROGRAM

## 2024-01-17 PROCEDURE — C1760 CLOSURE DEV, VASC: HCPCS | Performed by: STUDENT IN AN ORGANIZED HEALTH CARE EDUCATION/TRAINING PROGRAM

## 2024-01-17 PROCEDURE — 25010000002 HEPARIN (PORCINE) PER 1000 UNITS

## 2024-01-17 PROCEDURE — 25010000002 PROPOFOL 10 MG/ML EMULSION

## 2024-01-17 PROCEDURE — C1766 INTRO/SHEATH,STRBLE,NON-PEEL: HCPCS | Performed by: STUDENT IN AN ORGANIZED HEALTH CARE EDUCATION/TRAINING PROGRAM

## 2024-01-17 PROCEDURE — 93005 ELECTROCARDIOGRAM TRACING: CPT | Performed by: STUDENT IN AN ORGANIZED HEALTH CARE EDUCATION/TRAINING PROGRAM

## 2024-01-17 PROCEDURE — 25010000002 HEPARIN (PORCINE) 1000-0.9 UT/500ML-% SOLUTION: Performed by: STUDENT IN AN ORGANIZED HEALTH CARE EDUCATION/TRAINING PROGRAM

## 2024-01-17 PROCEDURE — 25010000002 PROTAMINE SULFATE PER 10 MG

## 2024-01-17 PROCEDURE — 25010000002 HEPARIN (PORCINE) 2000-0.9 UNIT/L-% SOLUTION: Performed by: STUDENT IN AN ORGANIZED HEALTH CARE EDUCATION/TRAINING PROGRAM

## 2024-01-17 PROCEDURE — C1759 CATH, INTRA ECHOCARDIOGRAPHY: HCPCS | Performed by: STUDENT IN AN ORGANIZED HEALTH CARE EDUCATION/TRAINING PROGRAM

## 2024-01-17 PROCEDURE — 85347 COAGULATION TIME ACTIVATED: CPT

## 2024-01-17 RX ORDER — PROPOFOL 10 MG/ML
VIAL (ML) INTRAVENOUS AS NEEDED
Status: DISCONTINUED | OUTPATIENT
Start: 2024-01-17 | End: 2024-01-17 | Stop reason: SURG

## 2024-01-17 RX ORDER — FENTANYL CITRATE 50 UG/ML
INJECTION, SOLUTION INTRAMUSCULAR; INTRAVENOUS AS NEEDED
Status: DISCONTINUED | OUTPATIENT
Start: 2024-01-17 | End: 2024-01-17 | Stop reason: SURG

## 2024-01-17 RX ORDER — SODIUM CHLORIDE 0.9 % (FLUSH) 0.9 %
10 SYRINGE (ML) INJECTION AS NEEDED
Status: DISCONTINUED | OUTPATIENT
Start: 2024-01-17 | End: 2024-01-17 | Stop reason: HOSPADM

## 2024-01-17 RX ORDER — ONDANSETRON 2 MG/ML
INJECTION INTRAMUSCULAR; INTRAVENOUS AS NEEDED
Status: DISCONTINUED | OUTPATIENT
Start: 2024-01-17 | End: 2024-01-17 | Stop reason: SURG

## 2024-01-17 RX ORDER — OMEPRAZOLE 40 MG/1
40 CAPSULE, DELAYED RELEASE ORAL DAILY
Qty: 90 CAPSULE | Refills: 0 | Status: SHIPPED | OUTPATIENT
Start: 2024-01-17

## 2024-01-17 RX ORDER — HEPARIN SODIUM 1000 [USP'U]/ML
INJECTION, SOLUTION INTRAVENOUS; SUBCUTANEOUS AS NEEDED
Status: DISCONTINUED | OUTPATIENT
Start: 2024-01-17 | End: 2024-01-17 | Stop reason: SURG

## 2024-01-17 RX ORDER — LIDOCAINE HYDROCHLORIDE 20 MG/ML
INJECTION, SOLUTION INFILTRATION; PERINEURAL
Status: DISCONTINUED | OUTPATIENT
Start: 2024-01-17 | End: 2024-01-17 | Stop reason: HOSPADM

## 2024-01-17 RX ORDER — ROCURONIUM BROMIDE 10 MG/ML
INJECTION, SOLUTION INTRAVENOUS AS NEEDED
Status: DISCONTINUED | OUTPATIENT
Start: 2024-01-17 | End: 2024-01-17 | Stop reason: SURG

## 2024-01-17 RX ORDER — HEPARIN SODIUM 200 [USP'U]/100ML
INJECTION, SOLUTION INTRAVENOUS
Status: DISCONTINUED | OUTPATIENT
Start: 2024-01-17 | End: 2024-01-17 | Stop reason: HOSPADM

## 2024-01-17 RX ORDER — NEOSTIGMINE METHYLSULFATE 5 MG/5 ML
SYRINGE (ML) INTRAVENOUS AS NEEDED
Status: DISCONTINUED | OUTPATIENT
Start: 2024-01-17 | End: 2024-01-17 | Stop reason: SURG

## 2024-01-17 RX ORDER — SUCCINYLCHOLINE/SOD CL,ISO/PF 200MG/10ML
SYRINGE (ML) INTRAVENOUS AS NEEDED
Status: DISCONTINUED | OUTPATIENT
Start: 2024-01-17 | End: 2024-01-17 | Stop reason: SURG

## 2024-01-17 RX ORDER — PROPOFOL 10 MG/ML
VIAL (ML) INTRAVENOUS CONTINUOUS PRN
Status: DISCONTINUED | OUTPATIENT
Start: 2024-01-17 | End: 2024-01-17

## 2024-01-17 RX ORDER — SODIUM CHLORIDE 0.9 % (FLUSH) 0.9 %
10 SYRINGE (ML) INJECTION EVERY 12 HOURS SCHEDULED
Status: DISCONTINUED | OUTPATIENT
Start: 2024-01-17 | End: 2024-01-17 | Stop reason: HOSPADM

## 2024-01-17 RX ORDER — SODIUM CHLORIDE 9 MG/ML
40 INJECTION, SOLUTION INTRAVENOUS AS NEEDED
Status: DISCONTINUED | OUTPATIENT
Start: 2024-01-17 | End: 2024-01-17 | Stop reason: HOSPADM

## 2024-01-17 RX ORDER — PROTAMINE SULFATE 10 MG/ML
INJECTION, SOLUTION INTRAVENOUS AS NEEDED
Status: DISCONTINUED | OUTPATIENT
Start: 2024-01-17 | End: 2024-01-17 | Stop reason: SURG

## 2024-01-17 RX ORDER — BUPIVACAINE HCL/0.9 % NACL/PF 0.125 %
PLASTIC BAG, INJECTION (ML) EPIDURAL AS NEEDED
Status: DISCONTINUED | OUTPATIENT
Start: 2024-01-17 | End: 2024-01-17 | Stop reason: SURG

## 2024-01-17 RX ADMIN — HEPARIN SODIUM 10000 UNITS: 1000 INJECTION, SOLUTION INTRAVENOUS; SUBCUTANEOUS at 08:38

## 2024-01-17 RX ADMIN — HEPARIN SODIUM 20000 UNITS: 1000 INJECTION, SOLUTION INTRAVENOUS; SUBCUTANEOUS at 08:20

## 2024-01-17 RX ADMIN — ONDANSETRON 4 MG: 2 INJECTION INTRAMUSCULAR; INTRAVENOUS at 10:04

## 2024-01-17 RX ADMIN — ROCURONIUM BROMIDE 20 MG: 10 SOLUTION INTRAVENOUS at 08:59

## 2024-01-17 RX ADMIN — HEPARIN SODIUM 3000 UNITS: 1000 INJECTION, SOLUTION INTRAVENOUS; SUBCUTANEOUS at 08:58

## 2024-01-17 RX ADMIN — Medication 120 MG: at 07:50

## 2024-01-17 RX ADMIN — ROCURONIUM BROMIDE 30 MG: 10 SOLUTION INTRAVENOUS at 08:38

## 2024-01-17 RX ADMIN — HEPARIN SODIUM 4000 UNITS: 1000 INJECTION, SOLUTION INTRAVENOUS; SUBCUTANEOUS at 09:18

## 2024-01-17 RX ADMIN — GLYCOPYRROLATE 0.4 MG: 0.2 INJECTION INTRAMUSCULAR; INTRAVENOUS at 09:50

## 2024-01-17 RX ADMIN — Medication 100 MCG: at 09:04

## 2024-01-17 RX ADMIN — PROPOFOL 110 MG: 10 INJECTION, EMULSION INTRAVENOUS at 07:49

## 2024-01-17 RX ADMIN — PROPOFOL 150 MCG/KG/MIN: 10 INJECTION, EMULSION INTRAVENOUS at 07:49

## 2024-01-17 RX ADMIN — SODIUM CHLORIDE 40 ML: 9 INJECTION, SOLUTION INTRAVENOUS at 07:26

## 2024-01-17 RX ADMIN — PROTAMINE SULFATE 50 MG: 10 INJECTION, SOLUTION INTRAVENOUS at 09:47

## 2024-01-17 RX ADMIN — Medication 3 MG: at 09:50

## 2024-01-17 RX ADMIN — FENTANYL CITRATE 100 MCG: 50 INJECTION INTRAMUSCULAR; INTRAVENOUS at 07:49

## 2024-01-17 NOTE — INTERVAL H&P NOTE
H&P updated. The patient was examined and the following changes are noted:  The previous procedure was cancelled due to sildenafil use.  Presents today for procedure.  No significant change to H&P from prior.

## 2024-01-17 NOTE — Clinical Note
Hemostasis started on the right femoral vein. Vascade MVP was used in achieving hemostasis. Closure device deployed in the vessel. Hemostasis achieved successfully. Closure device additional comment: All three RFV sheaths removed and closed with Vascades.

## 2024-01-17 NOTE — ANESTHESIA PREPROCEDURE EVALUATION
Anesthesia Evaluation     Patient summary reviewed   no history of anesthetic complications:   NPO Solid Status: > 8 hours  NPO Liquid Status: > 8 hours           Airway   Mallampati: II  TM distance: >3 FB  Neck ROM: full  No difficulty expected  Dental - normal exam     Pulmonary    (+) a smoker Former,sleep apnea  Cardiovascular   Exercise tolerance: good (4-7 METS) (Limited by post knee replacement recover, recently performed 12/11/23)    PT is on anticoagulation therapy    (+) hypertension well controlled, dysrhythmias Atrial Fib, hyperlipidemia    ROS comment: Echo:  ·  Left ventricular systolic function is normal. Left ventricular ejection fraction appears to be 56 - 60%.  ·  Estimated right ventricular systolic pressure from tricuspid regurgitation is normal (<35 mmHg).  ·  The right ventricular cavity is dilated (though not seen well enough to quantify severity of dilation), with normal systolic function.  ·  No significant (greater than mild) valvular pathology.  ·  Rhythm is atrial fibrillation.  ·  No prior studies available for comparison.      Neuro/Psych- negative ROS  (-) seizures, TIA, CVA  GI/Hepatic/Renal/Endo    (+) renal disease-, diabetes mellitus well controlled  (-) liver disease    Musculoskeletal     Abdominal    Substance History - negative use     OB/GYN          Other   arthritis, blood dyscrasia anemia,                 Anesthesia Plan    ASA 3     general     (Patient reports taking sildenafil at 1800 last pm. Will discuss with Dr. Gotti, given significant risk of  intraoperative hypotension)  intravenous induction     Anesthetic plan, risks, benefits, and alternatives have been provided, discussed and informed consent has been obtained with: patient.    Plan discussed with CRNA.    CODE STATUS:

## 2024-01-17 NOTE — DISCHARGE INSTRUCTIONS
Post-Atrial Fibrillation Ablation Instructions    ACTIVITY    Avoid driving, operating machinery, or alcohol consumption for 24 hours after receiving sedation. In addition, avoid signing legal documents or participating in legal proceedings.    You may resume normal activities after 24 hours except for the following:    Avoid heavy lifting (no more than 10 pounds) and vigorous activities for a minimum of 7 days. This includes activities such as jogging, exercise classes, sports activities, etc.    You may resume walking and other normal activities.    Avoid sitting more than 2 consecutive hours during waking hours for the first 3 days. If you are traveling, stop for brief (5 minutes) walks every two hours.    MEDICATIONS  Continue Eliquis at your usual dose this evening. Do not stop this medication without consulting with your cardiologist.    2.   Antacid: You will need to take an antacid for 90 days following your procedure.  This will be prescribed to you and sent to your pharmacy.  The antacid is called omeprazole.  Please take it as prescribed for 90 days and then stop.    MEDICAL FOLLOW UP   EP clinic follow up with Dr. Gotti on 2/16/2024..    PLEASE NOTIFY US IF YOU DEVELOP    Fever of 101 or greater during your first few days at home    The occurrence of a new, persistent cough or unexplained shortness of breath.    A groin bruise may be expected. Initial soreness and discomfort should improve over the first few days. If you continue to have discomfort or if bruising is excessive, please call us.    Patients often experience palpitations and even atrial fibrillation during the healing period.    Please call if you have an episode of atrial fibrillation accompanied by fast heart rate greater than 120 beats per minute for extended period or Atrial Fibrillation that last longer than 1-2 days.    Mild chest soreness is common and may be treated with Tylenol, Advil, or Motrin.  This soreness typically worsens when  taking deep breaths.  If you notice these symptoms, start one of these medications according to the package directions and continue for 2-3 days. If your symptoms are not relieved or become severe, please call us.      REASONS TO GO TO THE EMERGENCY ROOM FOR EVALUATION:   Severe chest pain  Significant shortness of breath at rest  Near passing out or passing out episodes soon after your ablation  Symptoms of chest pain or shortness of breath associated with low blood pressure (reading less than 90 for the top number / systolic blood pressure)  Brisk bleeding or significant swelling from the groin where IVs were placed  Any concerns that an emergent or life threatening complication is occurring    If you have a clinical questions between the hours of 8am and 4pm, Monday - Friday please call the office and let us know your concern. Please leave a message if your call is not an emergency.    For emergencies, please go for evaluation at your nearest emergency department.    If you have a [employer] account, this an excellent way to communicate.  [employer] messages are checked Monday through Friday. Please allow 24-36 hours for your message to be answered.

## 2024-01-17 NOTE — ANESTHESIA POSTPROCEDURE EVALUATION
"Patient: Humberto Auguste Jr.    Procedure Summary       Date: 01/17/24 Room / Location: [facility] CATH LAB  /  [facility] CATH INVASIVE LOCATION    Anesthesia Start: 0737 Anesthesia Stop: 1010    Procedure: Ablation atrial fibrillation (Left) Diagnosis:       Persistent atrial fibrillation      (Atrial fibrillation (11536))    Providers: Yisel Gotti MD Provider: Inder Varela CRNA    Anesthesia Type: general ASA Status: 3            Anesthesia Type: general    Vitals  Vitals Value Taken Time   /79 01/17/24 1201   Temp     Pulse 68 01/17/24 1219   Resp 16 01/17/24 1200   SpO2 92 % 01/17/24 1219   Vitals shown include unfiled device data.        Post Anesthesia Care and Evaluation    Patient location during evaluation: PACU  Patient participation: complete - patient participated  Level of consciousness: awake and awake and alert  Pain score: 0  Pain management: adequate    Airway patency: patent  Anesthetic complications: No anesthetic complications  PONV Status: none  Cardiovascular status: acceptable  Respiratory status: acceptable  Hydration status: acceptable    Comments: Patient discharged according to acceptable Cody score per RN assessment. See nursing records for further information.     Blood pressure 131/79, pulse 65, temperature 96.7 °F (35.9 °C), temperature source Tympanic, resp. rate 16, height 190.5 cm (75\"), weight 127 kg (280 lb 12.8 oz), SpO2 92%.      "

## 2024-01-17 NOTE — ANESTHESIA PROCEDURE NOTES
Airway  Urgency: elective    Date/Time: 1/17/2024 7:51 AM  Airway not difficult    General Information and Staff    Patient location during procedure: OR    Indications and Patient Condition  Indications for airway management: airway protection    Preoxygenated: yes  Mask difficulty assessment: 1 - vent by mask    Final Airway Details  Final airway type: endotracheal airway      Successful airway: ETT  Cuffed: yes   Successful intubation technique: direct laryngoscopy  Endotracheal tube insertion site: oral  Blade: Reyes  Blade size: 4  ETT size (mm): 7.5  Cormack-Lehane Classification: grade I - full view of glottis  Placement verified by: chest auscultation and capnometry   Measured from: teeth  ETT/EBT  to teeth (cm): 23  Number of attempts at approach: 1  Assessment: lips, teeth, and gum same as pre-op and atraumatic intubation

## 2024-01-18 ENCOUNTER — TELEPHONE (OUTPATIENT)
Dept: CARDIOLOGY | Facility: CLINIC | Age: 74
End: 2024-01-18
Payer: MEDICARE

## 2024-01-18 LAB
QT INTERVAL: 380 MS
QT INTERVAL: 428 MS
QTC INTERVAL: 459 MS
QTC INTERVAL: 465 MS

## 2024-01-18 NOTE — TELEPHONE ENCOUNTER
Called patient to follow up from ablation on 1/17/2024. Pt states he is doing well this morning. We went over expectations, post procedure restrictions, and follow up appointments. All further questions were answered. Mr. Auguste verbalized understanding.    Amee Arnold RN

## 2024-01-19 NOTE — PROGRESS NOTES
Chief Complaint  Prostate Cancer    Subjective          Humberto Auguste Jr. presents to [facility] UROLOGY to follow-up multiple urologic issues as follows:  BPH with obstruction-underwent transurethral section of prostate January 2022.  This was complicated by bladder neck contracture with severe gross hematuria on 5/29/2023 at time of attempted catheter placement.  He did have a transurethral incision bladder neck contracture emergently at that time. See symptom score below under data  for specific symptomatology, severity as well as bother.   TURP Op Note by Camron Bowden MD (01/28/2022 07:17)   TUI BNCx Op Note by Camron Bowden MD (05/29/2023 20:45)     Adenocarcinoma prostate-incidental finding [place] grade 3+4 = 7 adenocarcinoma prostate on TURP.  He was only present 5% of the specimen.  We have him on watchful waiting.  Patient denies any possible systemic symptoms of prostate cancer such as weight loss, lower extremity edema, or skeletal pain that could be worrisome for systemic disease.      Erectile dysfunction-patient does have erectile dysfunction due to multiple factors.  He has responded reasonably well to sildenafil 100 mg.  Experiencing no side effects with medication              Current Outpatient Medications:     apixaban (ELIQUIS) 5 MG tablet tablet, Take 1 tablet by mouth 2 (Two) Times a Day., Disp: 60 tablet, Rfl: 11    Cod Liver Oil 1000 MG capsule, Take 2 capsules by mouth Daily., Disp: , Rfl:     dilTIAZem CD (Cartia XT) 240 MG 24 hr capsule, Take 1 capsule by mouth Daily., Disp: 30 capsule, Rfl: 11    furosemide (LASIX) 40 MG tablet, Take 1 tablet by mouth Daily As Needed., Disp: , Rfl:     lisinopril (PRINIVIL,ZESTRIL) 40 MG tablet, Take 10 mg by mouth Daily., Disp: , Rfl:     metFORMIN (GLUCOPHAGE) 500 MG tablet, Take 1 tablet by mouth 2 (Two) Times a Day With Meals., Disp: , Rfl:     omeprazole (priLOSEC) 40 MG capsule, Take 1 capsule by mouth Daily.,  Disp: 90 capsule, Rfl: 0    sildenafil (VIAGRA) 100 MG tablet, Take 1 tablet by mouth Take As Directed. 1-4 hours before sexual activity, Disp: 30 tablet, Rfl: 5    simvastatin (ZOCOR) 40 MG tablet, Take 1 tablet by mouth Every Night., Disp: , Rfl:     tamsulosin (FLOMAX) 0.4 MG capsule 24 hr capsule, Take 2 capsules by mouth Daily., Disp: , Rfl:   Past Medical History:   Diagnosis Date    Acute UTI (urinary tract infection) 5/29/2023    Arthritis     Benign prostatic hyperplasia 2/1/12    Diabetes mellitus     Elevated cholesterol     Enlarged prostate     Erectile dysfunction 2/1/16    Hypertension     Rotator cuff disorder, right     Sleep apnea      Past Surgical History:   Procedure Laterality Date    CARDIAC ELECTROPHYSIOLOGY PROCEDURE Left 1/17/2024    Procedure: Ablation atrial fibrillation;  Surgeon: Yisel Gotti MD;  Location: [facility] CATH INVASIVE LOCATION;  Service: Cardiovascular;  Laterality: Left;    COLONOSCOPY  06/02/2016    polyp removed from 95 cm.a long oa0xeefosp colon not allowing mitchell safr colonoscopy    COLONOSCOPY N/A 06/08/2021    Procedure: COLONOSCOPY WITH ANESTHESIA;  Surgeon: Rick Dixon DO;  Location: [facility] ENDOSCOPY;  Service: Gastroenterology;  Laterality: N/A;  pre op: hx polyps  post op:normal  PCP: Feliciano Kinsey MD    CYSTOSCOPY TRANSURETHRAL RESECTION OF PROSTATE N/A 01/28/2022    Procedure: TRANSURETHRAL RESECTION OF PROSTATE;  Surgeon: Camron Bowden MD;  Location: [facility] OR;  Service: Urology;  Laterality: N/A;    CYSTOSCOPY WITH CLOT EVACUATION N/A 5/29/2023    Procedure: CYSTOSCOPY WITH CLOT EVACUATION;  Surgeon: Camron Bowden MD;  Location: [facility] OR;  Service: Urology;  Laterality: N/A;    HERNIA REPAIR      PROSTATE SURGERY  2/1/22    SHOULDER ARTHROSCOPY W/ ROTATOR CUFF REPAIR Right 03/17/2020    Procedure: RIGHT SHOULDER  ROTATOR CUFF REPAIR, SUBACROMIAL DECOMPRESSION;  Surgeon: Alphonso Lundberg MD;  Location: [facility] OR;  Service:  "Orthopedics;  Laterality: Right;    VARICOCELE EXCISION  2/15/82    VARICOSE VEIN SURGERY             Review of Systems      Objective   PHYSICAL EXAM  Vital Signs:   Temp 97 °F (36.1 °C)   Ht 190.5 cm (75\")   Wt 129 kg (284 lb 6.4 oz)   BMI 35.55 kg/m²     Physical Exam        DATA  Result Review :              Results for orders placed or performed in visit on 02/08/24   POC Urinalysis Dipstick, Multipro    Specimen: Urine   Result Value Ref Range    Color Yellow Yellow, Straw, Dark Yellow, Amy    Clarity, UA Clear Clear    Glucose, UA Negative Negative mg/dL    Bilirubin Negative Negative    Ketones, UA Negative Negative    Specific Gravity  1.020 1.005 - 1.030    Blood, UA Negative Negative    pH, Urine 6.0 5.0 - 8.0    Protein, POC Negative Negative mg/dL    Urobilinogen, UA 0.2 E.U./dL Normal, 0.2 E.U./dL    Nitrite, UA Negative Negative    Leukocytes Negative Negative     Lab Results   Component Value Date    PSA 0.433 02/01/2024    PSA 0.646 03/02/2023    PSA 0.47 01/05/2023                        ASSESSMENT AND PLAN          Problem List Items Addressed This Visit          Genitourinary and Reproductive     Benign non-nodular prostatic hyperplasia with lower urinary tract symptoms    Overview     Added automatically from request for surgery [id number]          Other Visit Diagnoses       Prostate cancer    -  Primary    Relevant Orders    POC Urinalysis Dipstick, Multipro (Completed)    Acquired contracture of bladder neck        Erectile dysfunction, unspecified erectile dysfunction type            Each of these chronic Urologic conditions, which I have followed >1 year,  were evaluated and managed today as follows:     - Lower urinary tract symptoms are stable. Stream is reasonable.     -His PSA is still very good.  He has no worrisome symptoms.    -Bladder neck contracture appears to be stable at this time.  -Would continue sildenafil for ED.          FOLLOW UP     No follow-ups on file.        (Please " note that portions of this note were completed with a voice recognition program.)  Camron Bowden MD  02/08/24  10:57 CST

## 2024-01-20 LAB
QT INTERVAL: 368 MS
QTC INTERVAL: 450 MS

## 2024-02-08 ENCOUNTER — OFFICE VISIT (OUTPATIENT)
Dept: UROLOGY | Facility: CLINIC | Age: 74
End: 2024-02-08
Payer: MEDICARE

## 2024-02-08 VITALS — HEIGHT: 75 IN | WEIGHT: 284.4 LBS | BODY MASS INDEX: 35.36 KG/M2 | TEMPERATURE: 97 F

## 2024-02-08 DIAGNOSIS — N40.1 BENIGN NON-NODULAR PROSTATIC HYPERPLASIA WITH LOWER URINARY TRACT SYMPTOMS: ICD-10-CM

## 2024-02-08 DIAGNOSIS — N32.0 ACQUIRED CONTRACTURE OF BLADDER NECK: ICD-10-CM

## 2024-02-08 DIAGNOSIS — N52.9 ERECTILE DYSFUNCTION, UNSPECIFIED ERECTILE DYSFUNCTION TYPE: ICD-10-CM

## 2024-02-08 DIAGNOSIS — C61 PROSTATE CANCER: Primary | ICD-10-CM

## 2024-02-08 PROCEDURE — 81003 URINALYSIS AUTO W/O SCOPE: CPT | Performed by: UROLOGY

## 2024-02-08 PROCEDURE — 99214 OFFICE O/P EST MOD 30 MIN: CPT | Performed by: UROLOGY

## 2024-02-08 PROCEDURE — 1159F MED LIST DOCD IN RCRD: CPT | Performed by: UROLOGY

## 2024-02-08 PROCEDURE — 1160F RVW MEDS BY RX/DR IN RCRD: CPT | Performed by: UROLOGY

## 2024-02-16 ENCOUNTER — PREP FOR SURGERY (OUTPATIENT)
Dept: OTHER | Facility: HOSPITAL | Age: 74
End: 2024-02-16
Payer: MEDICARE

## 2024-02-16 ENCOUNTER — OFFICE VISIT (OUTPATIENT)
Dept: CARDIOLOGY | Facility: CLINIC | Age: 74
End: 2024-02-16
Payer: MEDICARE

## 2024-02-16 VITALS
HEIGHT: 75 IN | HEART RATE: 74 BPM | DIASTOLIC BLOOD PRESSURE: 60 MMHG | SYSTOLIC BLOOD PRESSURE: 110 MMHG | BODY MASS INDEX: 35.06 KG/M2 | WEIGHT: 282 LBS

## 2024-02-16 DIAGNOSIS — I48.19 PERSISTENT ATRIAL FIBRILLATION: Primary | ICD-10-CM

## 2024-02-16 PROCEDURE — 3074F SYST BP LT 130 MM HG: CPT | Performed by: STUDENT IN AN ORGANIZED HEALTH CARE EDUCATION/TRAINING PROGRAM

## 2024-02-16 PROCEDURE — 93000 ELECTROCARDIOGRAM COMPLETE: CPT | Performed by: STUDENT IN AN ORGANIZED HEALTH CARE EDUCATION/TRAINING PROGRAM

## 2024-02-16 PROCEDURE — 3078F DIAST BP <80 MM HG: CPT | Performed by: STUDENT IN AN ORGANIZED HEALTH CARE EDUCATION/TRAINING PROGRAM

## 2024-02-16 PROCEDURE — 99213 OFFICE O/P EST LOW 20 MIN: CPT | Performed by: STUDENT IN AN ORGANIZED HEALTH CARE EDUCATION/TRAINING PROGRAM

## 2024-02-16 NOTE — PROGRESS NOTES
"Chief Complaint  Atrial Fibrillation (HOSP F/U - S/P ABLATION)    Subjective        History of Present Illness    EP history:   Persistent AF  -7/19/2023     Cardiology history:   Hypertension  Hyperlipidemia     Medical history:  Hematuria  ROSCOE with CPAP   OA/chronic knee pain   Thyroid nodules   Diabetes Mellitus  CKD stage 3a  BPH       Humberto Auguste Jr. is a 73 y.o. male with problem list as above who presents to the clinic for follow up of persistent atrial fibrillation.  Unfortunately, he has had recurrence of atrial fibrillation.  He does have some continued fatigue and shortness of breath.  He is unsure when he had exactly when out of rhythm.    Objective   Vital Signs:  /60   Pulse 74   Ht 190.5 cm (75\")   Wt 128 kg (282 lb)   BMI 35.25 kg/m²   Estimated body mass index is 35.25 kg/m² as calculated from the following:    Height as of this encounter: 190.5 cm (75\").    Weight as of this encounter: 128 kg (282 lb).      Physical Exam  Vitals reviewed.   Constitutional:       Appearance: Normal appearance.   Cardiovascular:      Rate and Rhythm: Normal rate and regular rhythm.      Pulses: Normal pulses.      Heart sounds: Normal heart sounds.   Pulmonary:      Effort: Pulmonary effort is normal.      Breath sounds: Normal breath sounds.   Musculoskeletal:         General: No swelling.   Neurological:      Mental Status: He is alert and oriented to person, place, and time.   Psychiatric:         Mood and Affect: Mood normal.         Judgment: Judgment normal.        Result Review :  The following data was reviewed by: Yisel Gotti MD on 02/16/2024:    [id number] SCORE   No data recorded          ECG 12 Lead    Date/Time: 2/16/2024 4:11 PM  Performed by: Yisel Gotti MD    Authorized by: Rebecca Gustafson PA  Comparison: compared with previous ECG from 1/17/2024  Comparison to previous ECG: Atrial fibrillation has replaced sinus rhythm  Rhythm: atrial fibrillation  Rate: normal  Conduction: " conduction normal  QRS axis: normal  Other findings: non-specific ST-T wave changes    Clinical impression: abnormal EKG              Assessment and Plan   Diagnoses and all orders for this visit:    1. Persistent atrial fibrillation (Primary)        Humberto Auguste Jr. is a 73 y.o. male with problem list as above who presents to the clinic for follow up of persistent atrial fibrillation.  He has unfortunately had recurrence of atrial fibrillation following ablation.  This was discussed as a possility prior to the procedure.  Early recurrence within the first month is not desirable, however not a good predictor of long term efficacy of the procedure as this can be secondary to the healing process from the ablation causing ectopy.  Given this, we will plan for a DCCV to try to restore NSR.      I have discussed risks, benefits, and alternatives of a cardioversion with the patient.  Alternatives discussed include continued observation and medical management.  A cardioversion is generally not considered a high risk procedure but still has possible complications including but not limited to skin irritation, skin burns, stroke, or onset of either tachy or bradyarrhythmias at the time of the procedure.  Possibilities of recurrent arrhythmias and the possible need for additional procedures and/or medical therapy was discussed. Questions asked were appropriately answered.  No guarantees were made or implied.     Despite this, they would still like to proceed.      Plan:  -Schedule for direct-current cardioversion  -No additional medication changes for now  -Continue anticoagulation given elevated [id number]  -Long-term plan for watchcassie with Dr. Gonzalez             Follow Up   Return in about 3 months (around 5/16/2024).  Patient was given instructions and counseling regarding his condition or for health maintenance advice. Please see specific information pulled into the AVS if appropriate.     Part of this note may  be an electronic transcription/translation of spoken language to printed text using the Dragon Dictation System.

## 2024-02-16 NOTE — H&P (VIEW-ONLY)
"Chief Complaint  Atrial Fibrillation (HOSP F/U - S/P ABLATION)    Subjective        History of Present Illness    EP history:   Persistent AF  -7/19/2023     Cardiology history:   Hypertension  Hyperlipidemia     Medical history:  Hematuria  ROSCOE with CPAP   OA/chronic knee pain   Thyroid nodules   Diabetes Mellitus  CKD stage 3a  BPH       Humberto Auguste Jr. is a 73 y.o. male with problem list as above who presents to the clinic for follow up of persistent atrial fibrillation.  Unfortunately, he has had recurrence of atrial fibrillation.  He does have some continued fatigue and shortness of breath.  He is unsure when he had exactly when out of rhythm.    Objective   Vital Signs:  /60   Pulse 74   Ht 190.5 cm (75\")   Wt 128 kg (282 lb)   BMI 35.25 kg/m²   Estimated body mass index is 35.25 kg/m² as calculated from the following:    Height as of this encounter: 190.5 cm (75\").    Weight as of this encounter: 128 kg (282 lb).      Physical Exam  Vitals reviewed.   Constitutional:       Appearance: Normal appearance.   Cardiovascular:      Rate and Rhythm: Normal rate and regular rhythm.      Pulses: Normal pulses.      Heart sounds: Normal heart sounds.   Pulmonary:      Effort: Pulmonary effort is normal.      Breath sounds: Normal breath sounds.   Musculoskeletal:         General: No swelling.   Neurological:      Mental Status: He is alert and oriented to person, place, and time.   Psychiatric:         Mood and Affect: Mood normal.         Judgment: Judgment normal.        Result Review :  The following data was reviewed by: Yisel Gotti MD on 02/16/2024:    [id number] SCORE   No data recorded          ECG 12 Lead    Date/Time: 2/16/2024 4:11 PM  Performed by: Yisel Gotti MD    Authorized by: Rebecca Gustafson PA  Comparison: compared with previous ECG from 1/17/2024  Comparison to previous ECG: Atrial fibrillation has replaced sinus rhythm  Rhythm: atrial fibrillation  Rate: normal  Conduction: " conduction normal  QRS axis: normal  Other findings: non-specific ST-T wave changes    Clinical impression: abnormal EKG              Assessment and Plan   Diagnoses and all orders for this visit:    1. Persistent atrial fibrillation (Primary)        Humberto Auguste Jr. is a 73 y.o. male with problem list as above who presents to the clinic for follow up of persistent atrial fibrillation.  He has unfortunately had recurrence of atrial fibrillation following ablation.  This was discussed as a possility prior to the procedure.  Early recurrence within the first month is not desirable, however not a good predictor of long term efficacy of the procedure as this can be secondary to the healing process from the ablation causing ectopy.  Given this, we will plan for a DCCV to try to restore NSR.      I have discussed risks, benefits, and alternatives of a cardioversion with the patient.  Alternatives discussed include continued observation and medical management.  A cardioversion is generally not considered a high risk procedure but still has possible complications including but not limited to skin irritation, skin burns, stroke, or onset of either tachy or bradyarrhythmias at the time of the procedure.  Possibilities of recurrent arrhythmias and the possible need for additional procedures and/or medical therapy was discussed. Questions asked were appropriately answered.  No guarantees were made or implied.     Despite this, they would still like to proceed.      Plan:  -Schedule for direct-current cardioversion  -No additional medication changes for now  -Continue anticoagulation given elevated [id number]  -Long-term plan for watchcassie with Dr. Gonzalez             Follow Up   Return in about 3 months (around 5/16/2024).  Patient was given instructions and counseling regarding his condition or for health maintenance advice. Please see specific information pulled into the AVS if appropriate.     Part of this note may  be an electronic transcription/translation of spoken language to printed text using the Dragon Dictation System.

## 2024-02-26 ENCOUNTER — ANESTHESIA (OUTPATIENT)
Dept: CARDIOLOGY | Facility: HOSPITAL | Age: 74
End: 2024-02-26
Payer: MEDICARE

## 2024-02-26 ENCOUNTER — HOSPITAL ENCOUNTER (OUTPATIENT)
Dept: CARDIOLOGY | Facility: HOSPITAL | Age: 74
Discharge: HOME | End: 2024-02-26
Payer: MEDICARE

## 2024-02-26 ENCOUNTER — ANESTHESIA EVENT (OUTPATIENT)
Dept: CARDIOLOGY | Facility: HOSPITAL | Age: 74
End: 2024-02-26
Payer: MEDICARE

## 2024-02-26 VITALS
BODY MASS INDEX: 36.27 KG/M2 | HEIGHT: 74 IN | TEMPERATURE: 97.6 F | WEIGHT: 282.6 LBS | HEART RATE: 76 BPM | RESPIRATION RATE: 16 BRPM | DIASTOLIC BLOOD PRESSURE: 87 MMHG | OXYGEN SATURATION: 96 % | SYSTOLIC BLOOD PRESSURE: 168 MMHG

## 2024-02-26 DIAGNOSIS — I48.19 PERSISTENT ATRIAL FIBRILLATION: ICD-10-CM

## 2024-02-26 DIAGNOSIS — N52.9 ERECTILE DYSFUNCTION, UNSPECIFIED ERECTILE DYSFUNCTION TYPE: ICD-10-CM

## 2024-02-26 LAB
QT INTERVAL: 382 MS
QT INTERVAL: 394 MS
QTC INTERVAL: 435 MS
QTC INTERVAL: 443 MS

## 2024-02-26 PROCEDURE — 25010000002 PROPOFOL 1000 MG/100ML EMULSION

## 2024-02-26 PROCEDURE — 93005 ELECTROCARDIOGRAM TRACING: CPT | Performed by: STUDENT IN AN ORGANIZED HEALTH CARE EDUCATION/TRAINING PROGRAM

## 2024-02-26 PROCEDURE — 25810000003 SODIUM CHLORIDE 0.9 % SOLUTION

## 2024-02-26 PROCEDURE — 92960 CARDIOVERSION ELECTRIC EXT: CPT

## 2024-02-26 PROCEDURE — 92960 CARDIOVERSION ELECTRIC EXT: CPT | Performed by: STUDENT IN AN ORGANIZED HEALTH CARE EDUCATION/TRAINING PROGRAM

## 2024-02-26 RX ORDER — PROPOFOL 10 MG/ML
INJECTION, EMULSION INTRAVENOUS AS NEEDED
Status: DISCONTINUED | OUTPATIENT
Start: 2024-02-26 | End: 2024-02-26 | Stop reason: SURG

## 2024-02-26 RX ORDER — LIDOCAINE HYDROCHLORIDE 20 MG/ML
INJECTION, SOLUTION EPIDURAL; INFILTRATION; INTRACAUDAL; PERINEURAL AS NEEDED
Status: DISCONTINUED | OUTPATIENT
Start: 2024-02-26 | End: 2024-02-26 | Stop reason: SURG

## 2024-02-26 RX ORDER — SODIUM CHLORIDE 9 MG/ML
INJECTION, SOLUTION INTRAVENOUS CONTINUOUS PRN
Status: DISCONTINUED | OUTPATIENT
Start: 2024-02-26 | End: 2024-02-26 | Stop reason: SURG

## 2024-02-26 RX ORDER — SILDENAFIL 100 MG/1
50 TABLET, FILM COATED ORAL TAKE AS DIRECTED
Start: 2024-02-26

## 2024-02-26 RX ADMIN — LIDOCAINE HYDROCHLORIDE 60 MG: 20 INJECTION, SOLUTION EPIDURAL; INFILTRATION; INTRACAUDAL; PERINEURAL at 08:10

## 2024-02-26 RX ADMIN — PROPOFOL 80 MG: 10 INJECTION, EMULSION INTRAVENOUS at 08:10

## 2024-02-26 RX ADMIN — SODIUM CHLORIDE: 9 INJECTION, SOLUTION INTRAVENOUS at 08:04

## 2024-02-26 NOTE — ANESTHESIA PREPROCEDURE EVALUATION
Anesthesia Evaluation     Patient summary reviewed   no history of anesthetic complications:   NPO Solid Status: > 8 hours  NPO Liquid Status: > 8 hours           Airway   Mallampati: II  TM distance: >3 FB  Neck ROM: full  No difficulty expected  Dental      Pulmonary    (+) a smoker Former,sleep apnea  Cardiovascular   Exercise tolerance: good (4-7 METS) (Limited by post knee replacement recover, recently performed 12/11/23)    ECG reviewed  PT is on anticoagulation therapy    (+) hypertension well controlled, dysrhythmias Atrial Fib, hyperlipidemia    ROS comment: Echo:  ·  Left ventricular systolic function is normal. Left ventricular ejection fraction appears to be 56 - 60%.  ·  Estimated right ventricular systolic pressure from tricuspid regurgitation is normal (<35 mmHg).  ·  The right ventricular cavity is dilated (though not seen well enough to quantify severity of dilation), with normal systolic function.  ·  No significant (greater than mild) valvular pathology.  ·  Rhythm is atrial fibrillation.  ·  No prior studies available for comparison.      Neuro/Psych- negative ROS  (-) seizures, TIA, CVA  GI/Hepatic/Renal/Endo    (+) renal disease-, diabetes mellitus well controlled  (-) liver disease    Musculoskeletal     Abdominal    Substance History - negative use     OB/GYN          Other   arthritis, blood dyscrasia anemia,                 Anesthesia Plan    ASA 3     MAC     intravenous induction     Anesthetic plan, risks, benefits, and alternatives have been provided, discussed and informed consent has been obtained with: patient.    CODE STATUS:

## 2024-02-26 NOTE — DISCHARGE INSTRUCTIONS
Post-Cardioversion Instructions    Discharge Instructions for Cardioversion  You had a procedure called cardioversion. This helped restore your heart’s normal rhythm. Here are some instructions to follow while you recover.    Home care  Because cardioversion typically requires sedation, you won't be able to drive home. You will need a ride. Wait at least 24 hours before driving a car or operating heavy machinery after receiving sedating medicines.  Don’t be alarmed if the skin on your chest is irritated or feels like it is sunburned. You may use an over the counter lotion to relieve this discomfort. These minor symptoms will go away in a few days.  If the redness is itching, you may use a 1% hydrocortisone cream on the skin irritation for 3-5 days.  You can get this cream over the counter at your local pharmacy and it does not require a prescription.  Continue your medications as prescribed otherwise per the discharge information.  It is particularly important not to stop your blood thinner for the next 30 days after a cardioversion.  Please call us in the clinic if you need to stop your blood thinner for any reason.  Learn to take your own pulse. Keep a record of your results. Ask your healthcare provider when you should seek emergency medical attention. He or she will tell you which pulse rate reading is dangerous.   Keep in mind this procedure may need to be repeated if the abnormal heart rhythm returns. After the procedure, your healthcare provider will tell you if the treatment worked or if you will need further treatments or medication.    Follow-up care  Follow up with Dr. Gotti as scheduled    Call 911  Call 911 right away if you have:  Severe chest pain  Shortness of breath at rest  Loss of vision, speech, or strength or coordination in any body part    When to call your healthcare provider  Call your healthcare provider right away if you:  Feel faint, dizzy, or lightheaded  Have chest pain with increased  activity  Have irregular heartbeat or fast pulse  Have bleeding issues from blood-thinning medicines

## 2024-02-26 NOTE — INTERVAL H&P NOTE
H&P reviewed. The patient was examined and there are no changes to the H&P.        Since the time of the last clinic visit, denies significant change in symptoms.  Denies missing any doses of his medications.  No new ER visits or hospitalizations.    Exam:  Unchanged from last clinic visit.    Labs:  Reviewed.    Assessment/plan:  Humberto Auguste Jr. is a 73 y.o. male who presents to the hospital for outpatient DCCV for persistent atrial fibrillation.  There are no apparent contraindications to proceeding and he is medically appropriate for outpatient management with this procedure.      I have discussed risks, benefits, and alternatives of a cardioversion with the patient.  Alternatives discussed include continued observation and medical management.  A cardioversion is generally not considered a high risk procedure but still has possible complications including but not limited to skin irritation, skin burns, stroke, or onset of either tachy or bradyarrhythmias at the time of the procedure.  Possibilities of recurrent arrhythmias and the possible need for additional procedures and/or medical therapy was discussed. Questions asked were appropriately answered.  No guarantees were made or implied.     Despite this, they would still like to proceed.    Plan:  - Proceed with DCCV

## 2024-02-26 NOTE — ANESTHESIA POSTPROCEDURE EVALUATION
"Patient: Humberto Auguste Jr.    Procedure Summary       Date: 02/26/24 Room / Location: [facility] CATH LAB    Anesthesia Start: 0804 Anesthesia Stop: 0818    Procedure: CARDIOVERSION EXTERNAL IN CARDIOLOGY DEPARTMENT Diagnosis:       Persistent atrial fibrillation      (Persistent atrial fibrillation)    Scheduled Providers: Yisel Gotti MD Provider: Monica Perea CRNA    Anesthesia Type: MAC ASA Status: 3            Anesthesia Type: MAC    Vitals  Vitals Value Taken Time   /84 02/26/24 0916   Temp     Pulse 76 02/26/24 0920   Resp 20 02/26/24 0830   SpO2 97 % 02/26/24 0920   Vitals shown include unfiled device data.        Post Anesthesia Care and Evaluation    Patient location during evaluation: PACU  Patient participation: complete - patient participated  Level of consciousness: awake and alert  Pain management: adequate    Airway patency: patent  Anesthetic complications: No anesthetic complications    Cardiovascular status: acceptable  Respiratory status: acceptable  Hydration status: acceptable    Comments: Blood pressure 131/76, pulse 76, temperature 97.6 °F (36.4 °C), resp. rate 20, height 188 cm (74\"), weight 128 kg (282 lb 9.6 oz), SpO2 95%.    Pt discharged from PACU based on goyo score >8    "

## 2024-03-07 LAB
QT INTERVAL: 382 MS
QT INTERVAL: 394 MS
QTC INTERVAL: 435 MS
QTC INTERVAL: 443 MS

## 2024-04-01 ENCOUNTER — OFFICE VISIT (OUTPATIENT)
Dept: CARDIOLOGY | Facility: CLINIC | Age: 74
End: 2024-04-01
Payer: MEDICARE

## 2024-04-01 VITALS
HEART RATE: 76 BPM | BODY MASS INDEX: 35.31 KG/M2 | HEIGHT: 75 IN | OXYGEN SATURATION: 95 % | DIASTOLIC BLOOD PRESSURE: 56 MMHG | WEIGHT: 284 LBS | SYSTOLIC BLOOD PRESSURE: 138 MMHG

## 2024-04-01 DIAGNOSIS — I48.19 PERSISTENT ATRIAL FIBRILLATION: Primary | ICD-10-CM

## 2024-04-01 DIAGNOSIS — Z79.01 CHRONIC ANTICOAGULATION: ICD-10-CM

## 2024-04-01 DIAGNOSIS — I10 ESSENTIAL (PRIMARY) HYPERTENSION: ICD-10-CM

## 2024-04-01 RX ORDER — LISINOPRIL 40 MG/1
40 TABLET ORAL DAILY
COMMUNITY

## 2024-04-01 NOTE — PROGRESS NOTES
Subjective:     Encounter Date: 4/1/2024      Patient ID: Humberto Auguste Jr. is a 73 y.o. male.    Chief Complaint: Follow up atrial fibrillation    History of Present Illness    Mr. Auguste returns for follow up.     By way of review, he was hospitalized for noncardiac reasons, but incidentally discovered to be in atrial fibrillation in 05/2023 (he was actually hospitalized for gross hematuria, and suprapubic pain then). Once safe from a urologic standpoint, he was started on Eliquis. After a period of therapeutic anticoagulation, he was successfully cardioverted on 07/19/2023 with a subsequent monitor showing he remained in sinus rhythm after cardioversion for approximately 3 days, and then went back in atrial fibrillation until the device was removed. He was still in atrial fibrillation when back in the office on 08/25/2022. At that time, he reported he did feel slightly improved for those few days after the cardioversion, particularly with respect to improved energy. Discussed treatment options then, and ultimately opted for referring him to Dr. Gotti to consider ablation. He had not had any further hematuria, so, though discussed Watchman as a potential alternative, there was no imminent need for it at that time. He ended up seeing Rebecca Gustafson PA-C, on 11/17/2023. Ablation was recommended, but it was noted the patient was scheduled for knee surgery on 12/11/2023, and would like to defer ablation until after that.     He followed up in December 2023 and was doing well.  He was taking Eliquis without issues.  He was climbing stairs for exercise.  He was not having any chest pain or shortness of breath.  He was scheduled to see Dr. Gotti later that day.  Ultimately it was noted that approximately 3 months after ablation plans would be made for Watchman device implantation with Dr. Mayfield as an alternative to long-term anticoagulation given his prior hematuria.    He underwent successful ablation with  Dr. Gotti 1/17/2024.  Due to recurrent symptomatic atrial fibrillation he did require a cardioversion on 2/26.    The patient tells me today he had his knee surgery in December as well.  Overall he is doing well.  Based on checking his heart rhythm on his ray at home and his lack of unusual fatigue and shortness of breath, he does not believe he has had any atrial fibrillation since the cardioversion on 2/26.  He denies any chest pain, shortness of breath, palpitations.  He reports well-controlled blood pressure.  He denies any current bleeding issues aside from bruising easily.  He is interested in moving forward with Watchman device implantation.  He is compliant with his CPAP.  He tells me he takes Lasix most days at the direction of Dr. Kinsey.  He reports this does not really make a difference in his breathing but seems to keep his swelling down.    The following portions of the patient's history were reviewed and updated as appropriate: allergies, current medications, past family history, past medical history, past social history, past surgical history, and problem list.    Review of Systems   Constitutional: Negative for malaise/fatigue.   HENT:  Negative for congestion.    Cardiovascular:  Negative for chest pain, claudication, dyspnea on exertion, leg swelling, near-syncope, orthopnea, palpitations, paroxysmal nocturnal dyspnea and syncope.   Respiratory:  Negative for cough and shortness of breath.    Hematologic/Lymphatic: Does not bruise/bleed easily.   Musculoskeletal:  Negative for falls.   Gastrointestinal:  Negative for bloating.   Neurological:  Negative for dizziness, light-headedness and weakness.           Current Outpatient Medications:     apixaban (ELIQUIS) 5 MG tablet tablet, Take 1 tablet by mouth 2 (Two) Times a Day., Disp: 60 tablet, Rfl: 11    Cod Liver Oil 1000 MG capsule, Take 2 capsules by mouth Daily., Disp: , Rfl:     dilTIAZem CD (Cartia XT) 240 MG 24 hr capsule, Take 1 capsule by  mouth Daily., Disp: 30 capsule, Rfl: 11    furosemide (LASIX) 40 MG tablet, Take 1 tablet by mouth Daily As Needed., Disp: , Rfl:     metFORMIN (GLUCOPHAGE) 500 MG tablet, Take 1 tablet by mouth 2 (Two) Times a Day With Meals., Disp: , Rfl:     sildenafil (VIAGRA) 100 MG tablet, Take 0.5 tablets by mouth Take As Directed. 1-4 hours before sexual activity, Disp: , Rfl:     simvastatin (ZOCOR) 40 MG tablet, Take 1 tablet by mouth Every Night., Disp: , Rfl:     tamsulosin (FLOMAX) 0.4 MG capsule 24 hr capsule, Take 2 capsules by mouth Daily., Disp: , Rfl:     lisinopril (PRINIVIL,ZESTRIL) 40 MG tablet, Take 1 tablet by mouth Daily., Disp: , Rfl:        Objective:      Vitals:    04/01/24 0906   BP: 138/56   Pulse: 76   SpO2: 95%   Weight - 284 lbs    Vitals and nursing note reviewed.   Constitutional:       General: Not in acute distress.     Appearance: Well-developed and not in distress. Not diaphoretic.   Neck:      Vascular: No JVD or JVR. JVD normal.   Pulmonary:      Effort: Pulmonary effort is normal. No respiratory distress.      Breath sounds: Normal breath sounds.   Cardiovascular:      Normal rate. Regular rhythm.      Murmurs: There is no murmur.   Edema:     Peripheral edema absent.   Abdominal:      Tenderness: There is no abdominal tenderness.   Skin:     General: Skin is warm and dry.   Neurological:      Mental Status: Alert, oriented to person, place, and time and oriented to person, place and time.         Lab Review:   Lab Results   Component Value Date    GLUCOSE 113 (H) 01/15/2024    BUN 31 (H) 01/15/2024    CREATININE 1.15 01/15/2024    EGFR 67.6 01/15/2024    BCR 27.0 (H) 01/15/2024    K 4.2 01/15/2024    CO2 27.0 01/15/2024    CALCIUM 8.7 01/15/2024    ALBUMIN 3.5 05/31/2023    BILITOT 0.3 05/31/2023    AST 15 05/31/2023    ALT 14 05/31/2023        Lab Results   Component Value Date    TSH 0.869 06/05/2023            ECG 12 Lead    Date/Time: 4/1/2024 9:32 AM  Performed by: Johanna Munguia  SULAIMAN    Authorized by: Johanna Munguia APRN  Comparison: compared with previous ECG from 2/26/2024  Similar to previous ECG  Rhythm: sinus rhythm  BPM: 76        Results for orders placed during the hospital encounter of 05/29/23    Adult Transthoracic Echo Complete W/ Cont if Necessary Per Protocol    Interpretation Summary    Left ventricular systolic function is normal. Left ventricular ejection fraction appears to be 56 - 60%.    Estimated right ventricular systolic pressure from tricuspid regurgitation is normal (<35 mmHg).    The right ventricular cavity is dilated (though not seen well enough to quantify severity of dilation), with normal systolic function.    No significant (greater than mild) valvular pathology.    Rhythm is atrial fibrillation.    No prior studies available for comparison.        Assessment/Plan:     Problem List Items Addressed This Visit          Cardiac and Vasculature    Essential (primary) hypertension    Relevant Medications    lisinopril (PRINIVIL,ZESTRIL) 40 MG tablet    Persistent atrial fibrillation - Primary       Coag and Thromboembolic    Chronic anticoagulation         Recommendations/plans:      1. Longstanding persistent atrial fibrillation: Maintaining normal sinus rhythm.  He underwent ablation with Dr. Gotti on 1/17 and required cardioversion for a   symptomatic recurrence on 2/26.  To his knowledge based on checking his heart rhythm on his ary at home and lack of symptoms, he does not believe he has been in atrial fibrillation since cardioversion on 2/26.    -Continue Eliquis for stroke prophylaxis and diltiazem for rate control with recurrences.  Continue to follow with electrophysiology.  Given his prior hematuria, bruising easily, and [id number] of 3 he is interested in moving forward with Watchman device implantation as an alternative to long-term anticoagulation.  He has previously had thorough discussions regarding the procedure with Dr. Mayfield as well.  I will  reach out to Dr. Mayfield and Jacqui Miguel, RN with structural clinic regarding getting this scheduled (should be at least 3 months after ablation which would be 4/17 or after). He is already 1 month out from most recent CV.     2. Chronic anticoagulation: Continue Eliquis for now as per above, with plans for Watchman device implantation in the near future as noted above    3. Essential hypertension: Well controlled. Continue current regimen.    Follow up with Dr. Mayfield in 6 months, call sooner with symptoms or concerns

## 2024-04-09 ENCOUNTER — TELEPHONE (OUTPATIENT)
Dept: CARDIOLOGY | Facility: CLINIC | Age: 74
End: 2024-04-09
Payer: MEDICARE

## 2024-04-09 NOTE — TELEPHONE ENCOUNTER
Call to the pt to discuss planning a SDM appt to talk about Cinthya with Dr. Hairston. Appt made for April 19 at 12 pm. We briefly discussed the [place]oshaman procedure and post procedure follow up. He verbalized understanding of the process.

## 2024-04-18 NOTE — PROGRESS NOTES
Reason for Visit: Left atrial appendage closure shared decision making.     HPI:  Humberto Auguste Jr. is a 73 y.o. male is here today for Left atrial appendage closure shared decision making.  He follows in cardiology clinic for persistent atrial fibrillation.  He is felt to be a poor candidate for chronic anticoagulation due to hematuria and easy bleeding.  He is therefore being worked up for alternative means of thromboembolic prophylaxis with left atrial appendage occlusion with the Watchman device.      Patient Active Problem List   Diagnosis    Traumatic rotator cuff tear, right, initial encounter    History of adenomatous polyp of colon    Benign non-nodular prostatic hyperplasia with lower urinary tract symptoms    Essential (primary) hypertension    Familial hypercholesterolemia    Obstructive sleep apnea    Type 2 diabetes mellitus without complication, without long-term current use of insulin    Sepsis, due to unspecified organism, unspecified whether acute organ dysfunction present    Acute UTI (urinary tract infection)    Gross hematuria    Persistent atrial fibrillation    Chronic anticoagulation       Social History     Tobacco Use    Smoking status: Former     Current packs/day: 0.00     Average packs/day: 1 pack/day for 48.5 years (48.5 ttl pk-yrs)     Types: Cigarettes     Start date: 1967     Quit date: 12/15/2015     Years since quittin.3     Passive exposure: Past    Smokeless tobacco: Never    Tobacco comments:     Never again   Vaping Use    Vaping status: Never Used   Substance Use Topics    Alcohol use: No    Drug use: Never       Family History   Problem Relation Age of Onset    No Known Problems Mother     Diabetes Father         Type 2 diabetic; hes 91    Cancer Maternal Grandfather          in  of prostrate cancer; he was 81    Cancer Paternal Grandmother          of lung cancer ; he was 67    Diabetes Paternal Grandfather           of pneumonia;  "was 70; type 2 diabetes    Cancer Paternal Uncle         He  of leukemia; ; he was 49    Colon cancer Neg Hx     Colon polyps Neg Hx     Esophageal cancer Neg Hx        The following portions of the patient's history were reviewed and updated as appropriate: allergies, current medications, past family history, past medical history, past social history, past surgical history, and problem list.      Current Outpatient Medications:     apixaban (ELIQUIS) 5 MG tablet tablet, Take 1 tablet by mouth 2 (Two) Times a Day., Disp: 60 tablet, Rfl: 11    Cod Liver Oil 1000 MG capsule, Take 2 capsules by mouth Daily., Disp: , Rfl:     dilTIAZem CD (Cartia XT) 240 MG 24 hr capsule, Take 1 capsule by mouth Daily., Disp: 30 capsule, Rfl: 11    furosemide (LASIX) 40 MG tablet, Take 1 tablet by mouth Daily As Needed., Disp: , Rfl:     lisinopril (PRINIVIL,ZESTRIL) 40 MG tablet, Take 1 tablet by mouth Daily., Disp: , Rfl:     metFORMIN (GLUCOPHAGE) 500 MG tablet, Take 1 tablet by mouth 2 (Two) Times a Day With Meals., Disp: , Rfl:     sildenafil (VIAGRA) 100 MG tablet, Take 0.5 tablets by mouth Take As Directed. 1-4 hours before sexual activity, Disp: , Rfl:     simvastatin (ZOCOR) 40 MG tablet, Take 1 tablet by mouth Every Night., Disp: , Rfl:     tamsulosin (FLOMAX) 0.4 MG capsule 24 hr capsule, Take 2 capsules by mouth Daily., Disp: , Rfl:     Review of Systems   Constitutional: Negative for chills and fever.   Cardiovascular:  Negative for chest pain and paroxysmal nocturnal dyspnea.   Respiratory:  Negative for cough and shortness of breath.    Hematologic/Lymphatic: Bruises/bleeds easily.   Skin:  Negative for rash.   Gastrointestinal:  Negative for abdominal pain and heartburn.   Neurological:  Negative for dizziness and numbness.       Objective   /60   Pulse 104   Ht 190.5 cm (75\")   Wt 127 kg (280 lb)   SpO2 98%   BMI 35.00 kg/m²   Constitutional:       Appearance: Well-developed.   HENT:      Head: " Normocephalic and atraumatic.   Pulmonary:      Effort: Pulmonary effort is normal.      Breath sounds: Normal breath sounds.   Cardiovascular:      Normal rate. Irregular rhythm.   Edema:     Peripheral edema absent.   Skin:     General: Skin is warm and dry.   Neurological:      Mental Status: Alert and oriented to person, place, and time.       Procedures  CHADS-VASc Risk Assessment               3 Total Score    1 Hypertension    1 DM    1 Age 65-74    Criteria that do not apply:    CHF    Age >/= 75    PRIOR STROKE/TIA/THROMBO    Vascular Disease    Sex: Female             ICD-10-CM ICD-9-CM   1. Persistent atrial fibrillation  I48.19 427.31   2. Essential (primary) hypertension  I10 401.9   3. Chronic anticoagulation  Z79.01 V58.61   4. Gross hematuria  R31.0 599.71         Assessment/Plan:  Based on the history, it has been determined that the patient is a poor candidate for long-term oral anticoagulation.  The patient may, however, be tolerant to short-term anticoagulation as necessary.    Specifically regarding anticoagulation they have demonstrated: Hematuria and easy bleeding    We have discussed that you need stroke and bleeding risk on and off anticoagulation.  The individual [id number] stroke risk store is 3 (hypertension, diabetes mellitus, age 65-74), indicating an adjusted stroke rate of 3.2%/year.    Based on both stroke and bleeding risk, shared decision has been made to pursue closure of the left atrial appendage is a safe, effective alternative to long-term oral anticoagulation therapy for stroke prophylaxis.  This will reduce his long-term risk of incidence of bleeding.  Information regarding left atrial appendage closure and shared decision making were provided to patient.

## 2024-04-18 NOTE — H&P (VIEW-ONLY)
Reason for Visit: Left atrial appendage closure shared decision making.     HPI:  Humberto Auguste Jr. is a 73 y.o. male is here today for Left atrial appendage closure shared decision making.  He follows in cardiology clinic for persistent atrial fibrillation.  He is felt to be a poor candidate for chronic anticoagulation due to hematuria and easy bleeding.  He is therefore being worked up for alternative means of thromboembolic prophylaxis with left atrial appendage occlusion with the Watchman device.      Patient Active Problem List   Diagnosis    Traumatic rotator cuff tear, right, initial encounter    History of adenomatous polyp of colon    Benign non-nodular prostatic hyperplasia with lower urinary tract symptoms    Essential (primary) hypertension    Familial hypercholesterolemia    Obstructive sleep apnea    Type 2 diabetes mellitus without complication, without long-term current use of insulin    Sepsis, due to unspecified organism, unspecified whether acute organ dysfunction present    Acute UTI (urinary tract infection)    Gross hematuria    Persistent atrial fibrillation    Chronic anticoagulation       Social History     Tobacco Use    Smoking status: Former     Current packs/day: 0.00     Average packs/day: 1 pack/day for 48.5 years (48.5 ttl pk-yrs)     Types: Cigarettes     Start date: 1967     Quit date: 12/15/2015     Years since quittin.3     Passive exposure: Past    Smokeless tobacco: Never    Tobacco comments:     Never again   Vaping Use    Vaping status: Never Used   Substance Use Topics    Alcohol use: No    Drug use: Never       Family History   Problem Relation Age of Onset    No Known Problems Mother     Diabetes Father         Type 2 diabetic; hes 91    Cancer Maternal Grandfather          in  of prostrate cancer; he was 81    Cancer Paternal Grandmother          of lung cancer ; he was 67    Diabetes Paternal Grandfather           of pneumonia;  "was 70; type 2 diabetes    Cancer Paternal Uncle         He  of leukemia; ; he was 49    Colon cancer Neg Hx     Colon polyps Neg Hx     Esophageal cancer Neg Hx        The following portions of the patient's history were reviewed and updated as appropriate: allergies, current medications, past family history, past medical history, past social history, past surgical history, and problem list.      Current Outpatient Medications:     apixaban (ELIQUIS) 5 MG tablet tablet, Take 1 tablet by mouth 2 (Two) Times a Day., Disp: 60 tablet, Rfl: 11    Cod Liver Oil 1000 MG capsule, Take 2 capsules by mouth Daily., Disp: , Rfl:     dilTIAZem CD (Cartia XT) 240 MG 24 hr capsule, Take 1 capsule by mouth Daily., Disp: 30 capsule, Rfl: 11    furosemide (LASIX) 40 MG tablet, Take 1 tablet by mouth Daily As Needed., Disp: , Rfl:     lisinopril (PRINIVIL,ZESTRIL) 40 MG tablet, Take 1 tablet by mouth Daily., Disp: , Rfl:     metFORMIN (GLUCOPHAGE) 500 MG tablet, Take 1 tablet by mouth 2 (Two) Times a Day With Meals., Disp: , Rfl:     sildenafil (VIAGRA) 100 MG tablet, Take 0.5 tablets by mouth Take As Directed. 1-4 hours before sexual activity, Disp: , Rfl:     simvastatin (ZOCOR) 40 MG tablet, Take 1 tablet by mouth Every Night., Disp: , Rfl:     tamsulosin (FLOMAX) 0.4 MG capsule 24 hr capsule, Take 2 capsules by mouth Daily., Disp: , Rfl:     Review of Systems   Constitutional: Negative for chills and fever.   Cardiovascular:  Negative for chest pain and paroxysmal nocturnal dyspnea.   Respiratory:  Negative for cough and shortness of breath.    Hematologic/Lymphatic: Bruises/bleeds easily.   Skin:  Negative for rash.   Gastrointestinal:  Negative for abdominal pain and heartburn.   Neurological:  Negative for dizziness and numbness.       Objective   /60   Pulse 104   Ht 190.5 cm (75\")   Wt 127 kg (280 lb)   SpO2 98%   BMI 35.00 kg/m²   Constitutional:       Appearance: Well-developed.   HENT:      Head: " Normocephalic and atraumatic.   Pulmonary:      Effort: Pulmonary effort is normal.      Breath sounds: Normal breath sounds.   Cardiovascular:      Normal rate. Irregular rhythm.   Edema:     Peripheral edema absent.   Skin:     General: Skin is warm and dry.   Neurological:      Mental Status: Alert and oriented to person, place, and time.       Procedures  CHADS-VASc Risk Assessment               3 Total Score    1 Hypertension    1 DM    1 Age 65-74    Criteria that do not apply:    CHF    Age >/= 75    PRIOR STROKE/TIA/THROMBO    Vascular Disease    Sex: Female             ICD-10-CM ICD-9-CM   1. Persistent atrial fibrillation  I48.19 427.31   2. Essential (primary) hypertension  I10 401.9   3. Chronic anticoagulation  Z79.01 V58.61   4. Gross hematuria  R31.0 599.71         Assessment/Plan:  Based on the history, it has been determined that the patient is a poor candidate for long-term oral anticoagulation.  The patient may, however, be tolerant to short-term anticoagulation as necessary.    Specifically regarding anticoagulation they have demonstrated: Hematuria and easy bleeding    We have discussed that you need stroke and bleeding risk on and off anticoagulation.  The individual [id number] stroke risk store is 3 (hypertension, diabetes mellitus, age 65-74), indicating an adjusted stroke rate of 3.2%/year.    Based on both stroke and bleeding risk, shared decision has been made to pursue closure of the left atrial appendage is a safe, effective alternative to long-term oral anticoagulation therapy for stroke prophylaxis.  This will reduce his long-term risk of incidence of bleeding.  Information regarding left atrial appendage closure and shared decision making were provided to patient.

## 2024-04-19 ENCOUNTER — OFFICE VISIT (OUTPATIENT)
Dept: CARDIOLOGY | Facility: CLINIC | Age: 74
End: 2024-04-19
Payer: MEDICARE

## 2024-04-19 VITALS
WEIGHT: 280 LBS | BODY MASS INDEX: 34.82 KG/M2 | HEIGHT: 75 IN | OXYGEN SATURATION: 98 % | DIASTOLIC BLOOD PRESSURE: 60 MMHG | HEART RATE: 104 BPM | SYSTOLIC BLOOD PRESSURE: 111 MMHG

## 2024-04-19 DIAGNOSIS — Z79.01 CHRONIC ANTICOAGULATION: ICD-10-CM

## 2024-04-19 DIAGNOSIS — I10 ESSENTIAL (PRIMARY) HYPERTENSION: ICD-10-CM

## 2024-04-19 DIAGNOSIS — R31.0 GROSS HEMATURIA: ICD-10-CM

## 2024-04-19 DIAGNOSIS — I48.19 PERSISTENT ATRIAL FIBRILLATION: Primary | ICD-10-CM

## 2024-04-19 NOTE — LETTER
2024     Feliciano Kinsey MD  [address] [place] [address] [address]  [place] [address]    Patient: Humberto Auguste Jr.   YOB: 1950   Date of Visit: 2024       Dear Feilciano Kinsey MD    Humberto Auguste was in my office today. Below is a copy of my note.    If you have questions, please do not hesitate to call me. I look forward to following Humberto along with you.         Sincerely,        Chandler Hairston MD        CC: Aris Mayfield MD      Reason for Visit: Left atrial appendage closure shared decision making.     HPI:  Humberto Auguste Jr. is a 73 y.o. male is here today for Left atrial appendage closure shared decision making.  He follows in cardiology clinic for persistent atrial fibrillation.  He is felt to be a poor candidate for chronic anticoagulation due to hematuria and easy bleeding.  He is therefore being worked up for alternative means of thromboembolic prophylaxis with left atrial appendage occlusion with the Watchman device.      Patient Active Problem List   Diagnosis   • Traumatic rotator cuff tear, right, initial encounter   • History of adenomatous polyp of colon   • Benign non-nodular prostatic hyperplasia with lower urinary tract symptoms   • Essential (primary) hypertension   • Familial hypercholesterolemia   • Obstructive sleep apnea   • Type 2 diabetes mellitus without complication, without long-term current use of insulin   • Sepsis, due to unspecified organism, unspecified whether acute organ dysfunction present   • Acute UTI (urinary tract infection)   • Gross hematuria   • Persistent atrial fibrillation   • Chronic anticoagulation       Social History     Tobacco Use   • Smoking status: Former     Current packs/day: 0.00     Average packs/day: 1 pack/day for 48.5 years (48.5 ttl pk-yrs)     Types: Cigarettes     Start date: 1967     Quit date: 12/15/2015     Years since quittin.3     Passive exposure: Past   • Smokeless tobacco: Never   •  Tobacco comments:     Never again   Vaping Use   • Vaping status: Never Used   Substance Use Topics   • Alcohol use: No   • Drug use: Never       Family History   Problem Relation Age of Onset   • No Known Problems Mother    • Diabetes Father         Type 2 diabetic; hes 91   • Cancer Maternal Grandfather          in  of prostrate cancer; he was 81   • Cancer Paternal Grandmother          of lung cancer ; he was 67   • Diabetes Paternal Grandfather           of pneumonia; was 70; type 2 diabetes   • Cancer Paternal Uncle         He  of leukemia; ; he was 49   • Colon cancer Neg Hx    • Colon polyps Neg Hx    • Esophageal cancer Neg Hx        The following portions of the patient's history were reviewed and updated as appropriate: allergies, current medications, past family history, past medical history, past social history, past surgical history, and problem list.      Current Outpatient Medications:   •  apixaban (ELIQUIS) 5 MG tablet tablet, Take 1 tablet by mouth 2 (Two) Times a Day., Disp: 60 tablet, Rfl: 11  •  Cod Liver Oil 1000 MG capsule, Take 2 capsules by mouth Daily., Disp: , Rfl:   •  dilTIAZem CD (Cartia XT) 240 MG 24 hr capsule, Take 1 capsule by mouth Daily., Disp: 30 capsule, Rfl: 11  •  furosemide (LASIX) 40 MG tablet, Take 1 tablet by mouth Daily As Needed., Disp: , Rfl:   •  lisinopril (PRINIVIL,ZESTRIL) 40 MG tablet, Take 1 tablet by mouth Daily., Disp: , Rfl:   •  metFORMIN (GLUCOPHAGE) 500 MG tablet, Take 1 tablet by mouth 2 (Two) Times a Day With Meals., Disp: , Rfl:   •  sildenafil (VIAGRA) 100 MG tablet, Take 0.5 tablets by mouth Take As Directed. 1-4 hours before sexual activity, Disp: , Rfl:   •  simvastatin (ZOCOR) 40 MG tablet, Take 1 tablet by mouth Every Night., Disp: , Rfl:   •  tamsulosin (FLOMAX) 0.4 MG capsule 24 hr capsule, Take 2 capsules by mouth Daily., Disp: , Rfl:     Review of Systems   Constitutional: Negative for chills and fever.  "  Cardiovascular:  Negative for chest pain and paroxysmal nocturnal dyspnea.   Respiratory:  Negative for cough and shortness of breath.    Hematologic/Lymphatic: Bruises/bleeds easily.   Skin:  Negative for rash.   Gastrointestinal:  Negative for abdominal pain and heartburn.   Neurological:  Negative for dizziness and numbness.       Objective  /60   Pulse 104   Ht 190.5 cm (75\")   Wt 127 kg (280 lb)   SpO2 98%   BMI 35.00 kg/m²   Constitutional:       Appearance: Well-developed.   HENT:      Head: Normocephalic and atraumatic.   Pulmonary:      Effort: Pulmonary effort is normal.      Breath sounds: Normal breath sounds.   Cardiovascular:      Normal rate. Irregular rhythm.   Edema:     Peripheral edema absent.   Skin:     General: Skin is warm and dry.   Neurological:      Mental Status: Alert and oriented to person, place, and time.       Procedures  CHADS-VASc Risk Assessment               3 Total Score    1 Hypertension    1 DM    1 Age 65-74    Criteria that do not apply:    CHF    Age >/= 75    PRIOR STROKE/TIA/THROMBO    Vascular Disease    Sex: Female             ICD-10-CM ICD-9-CM   1. Persistent atrial fibrillation  I48.19 427.31   2. Essential (primary) hypertension  I10 401.9   3. Chronic anticoagulation  Z79.01 V58.61   4. Gross hematuria  R31.0 599.71         Assessment/Plan:  Based on the history, it has been determined that the patient is a poor candidate for long-term oral anticoagulation.  The patient may, however, be tolerant to short-term anticoagulation as necessary.    Specifically regarding anticoagulation they have demonstrated: Hematuria and easy bleeding    We have discussed that you need stroke and bleeding risk on and off anticoagulation.  The individual [id number] stroke risk store is 3 (hypertension, diabetes mellitus, age 65-74), indicating an adjusted stroke rate of 3.2%/year.    Based on both stroke and bleeding risk, shared decision has been made to pursue closure of " the left atrial appendage is a safe, effective alternative to long-term oral anticoagulation therapy for stroke prophylaxis.  This will reduce his long-term risk of incidence of bleeding.  Information regarding left atrial appendage closure and shared decision making were provided to patient.

## 2024-04-30 NOTE — PROGRESS NOTES
YOB: 1950  Location: [facility]  Location Address: [address] [place] [address], [facility], [address] [place] [address]  Location Phone: [phone]    Chief Complaint   Patient presents with    Thyroid Problem       History of Present Illness  Humberto Auguste Jr. is a 73 y.o. male.  Humberto Auguste Jr. is here for follow up of ENT complaints. The patient has had problems with thyroid nodules   Nodules were diagnosed during work up for lymphadenopathy   Patient denies sore throat, difficulty swallowing, or hoarseness   Dominant nodule underwent fine needle aspiration in November with benign findings    Fine Needle Aspiration (2022 09:27)    TSH (2023 14:05 EDT)   US Thyroid (2024 08:53)     Past Medical History:   Diagnosis Date    Acute UTI (urinary tract infection) 2023    Arthritis     Benign prostatic hyperplasia 12    Diabetes mellitus     Elevated cholesterol     Enlarged prostate     Erectile dysfunction 17    Hypertension     Rotator cuff disorder, right     Sleep apnea        Past Surgical History:   Procedure Laterality Date    CARDIAC ELECTROPHYSIOLOGY PROCEDURE Left 2024    Procedure: Ablation atrial fibrillation;  Surgeon: Yisel Gotti MD;  Location: [facility] CATH INVASIVE LOCATION;  Service: Cardiovascular;  Laterality: Left;    COLONOSCOPY  2016    polyp removed from 95 cm.a long [id number] colon not allowing mitchell safr colonoscopy    COLONOSCOPY N/A 2021    Procedure: COLONOSCOPY WITH ANESTHESIA;  Surgeon: Rick Dxion DO;  Location: [facility] ENDOSCOPY;  Service: Gastroenterology;  Laterality: N/A;  pre op: hx polyps  post op:normal  PCP: Feliciano Kinsey MD    CYSTOSCOPY TRANSURETHRAL RESECTION OF PROSTATE N/A 2022    Procedure: TRANSURETHRAL RESECTION OF PROSTATE;  Surgeon: Camron Bowden MD;  Location: [facility] OR;  Service: Urology;  Laterality: N/A;    CYSTOSCOPY WITH CLOT EVACUATION N/A 2023    Procedure:  CYSTOSCOPY WITH CLOT EVACUATION;  Surgeon: Camron Bowden MD;  Location:  [facility] OR;  Service: Urology;  Laterality: N/A;    HERNIA REPAIR      PROSTATE SURGERY  22    REPLACEMENT TOTAL KNEE      SHOULDER ARTHROSCOPY W/ ROTATOR CUFF REPAIR Right 2020    Procedure: RIGHT SHOULDER  ROTATOR CUFF REPAIR, SUBACROMIAL DECOMPRESSION;  Surgeon: Alphonso Lundberg MD;  Location:  [facility] OR;  Service: Orthopedics;  Laterality: Right;    VARICOCELE EXCISION  2/15/82    VARICOSE VEIN SURGERY         Outpatient Medications Marked as Taking for the 24 encounter (Office Visit) with Laura Omalley APRN   Medication Sig Dispense Refill    apixaban (ELIQUIS) 5 MG tablet tablet Take 1 tablet by mouth 2 (Two) Times a Day. 60 tablet 11    Cod Liver Oil 1000 MG capsule Take 2 capsules by mouth Daily.      dilTIAZem CD (Cartia XT) 240 MG 24 hr capsule Take 1 capsule by mouth Daily. 30 capsule 11    furosemide (LASIX) 40 MG tablet Take 1 tablet by mouth Daily As Needed.      lisinopril (PRINIVIL,ZESTRIL) 40 MG tablet Take 1 tablet by mouth Daily.      metFORMIN (GLUCOPHAGE) 500 MG tablet Take 1 tablet by mouth 2 (Two) Times a Day With Meals.      sildenafil (VIAGRA) 100 MG tablet Take 0.5 tablets by mouth Take As Directed. 1-4 hours before sexual activity      simvastatin (ZOCOR) 40 MG tablet Take 1 tablet by mouth Every Night.      tamsulosin (FLOMAX) 0.4 MG capsule 24 hr capsule Take 2 capsules by mouth Daily.         Percocet [oxycodone-acetaminophen]    Family History   Problem Relation Age of Onset    No Known Problems Mother     Diabetes Father         Type 2 diabetic; hes 91    Cancer Maternal Grandfather          in  of prostrate cancer; he was 81    Cancer Paternal Grandmother          of lung cancer ; he was 67    Diabetes Paternal Grandfather           of pneumonia; was 70; type 2 diabetes    Cancer Paternal Uncle         He  of leukemia; ; he was 49    Colon cancer Neg Hx      Colon polyps Neg Hx     Esophageal cancer Neg Hx        Social History     Socioeconomic History    Marital status:    Tobacco Use    Smoking status: Former     Current packs/day: 0.00     Average packs/day: 1 pack/day for 48.5 years (48.5 ttl pk-yrs)     Types: Cigarettes     Start date: 1967     Quit date: 12/15/2015     Years since quittin.3     Passive exposure: Past    Smokeless tobacco: Never    Tobacco comments:     Never again   Vaping Use    Vaping status: Never Used   Substance and Sexual Activity    Alcohol use: No    Drug use: Never    Sexual activity: Yes     Comment: My wife; I’m        Review of Systems   Constitutional: Negative.    HENT: Negative.         Vitals:    24 0906   BP: 111/60   Pulse: 103   Temp: 98.4 °F (36.9 °C)       Body mass index is 34 kg/m².    Objective     Physical Exam  Vitals reviewed.   Constitutional:       Appearance: He is obese.   HENT:      Head: Normocephalic.      Right Ear: Tympanic membrane, ear canal and external ear normal.      Left Ear: Tympanic membrane, ear canal and external ear normal.      Nose: Nose normal.      Mouth/Throat:      Lips: Pink.      Mouth: Mucous membranes are moist.      Pharynx: Uvula midline.   Neck:      Comments: Palpable right thyroid nodules     Musculoskeletal:      Cervical back: Full passive range of motion without pain.   Neurological:      Mental Status: He is alert.         Assessment & Plan   Diagnoses and all orders for this visit:    1. Thyroid nodule (Primary)      * Surgery not found *  No orders of the defined types were placed in this encounter.    Return if symptoms worsen or fail to improve.     Thyroid nodules stable on ultrasound  Patient would like to defer specialist treatment and call for necessary appointment     Patient Instructions   The patient has a thyroid nodule, which is relatively small, and studies do not suggest a malignancy. I have recommended observation with follow-up with me  for repeat ultrasound. I explained the pathology of thyroid nodules including the risks of cancer.

## 2024-05-01 ENCOUNTER — OFFICE VISIT (OUTPATIENT)
Dept: OTOLARYNGOLOGY | Facility: CLINIC | Age: 74
End: 2024-05-01
Payer: MEDICARE

## 2024-05-01 VITALS
DIASTOLIC BLOOD PRESSURE: 60 MMHG | HEART RATE: 103 BPM | SYSTOLIC BLOOD PRESSURE: 111 MMHG | HEIGHT: 75 IN | WEIGHT: 272 LBS | BODY MASS INDEX: 33.82 KG/M2 | TEMPERATURE: 98.4 F

## 2024-05-01 DIAGNOSIS — E04.1 THYROID NODULE: Primary | ICD-10-CM

## 2024-05-01 PROCEDURE — 99213 OFFICE O/P EST LOW 20 MIN: CPT | Performed by: NURSE PRACTITIONER

## 2024-05-01 PROCEDURE — 1160F RVW MEDS BY RX/DR IN RCRD: CPT | Performed by: NURSE PRACTITIONER

## 2024-05-01 PROCEDURE — 3078F DIAST BP <80 MM HG: CPT | Performed by: NURSE PRACTITIONER

## 2024-05-01 PROCEDURE — 1159F MED LIST DOCD IN RCRD: CPT | Performed by: NURSE PRACTITIONER

## 2024-05-01 PROCEDURE — 3074F SYST BP LT 130 MM HG: CPT | Performed by: NURSE PRACTITIONER

## 2024-05-03 ENCOUNTER — PREP FOR SURGERY (OUTPATIENT)
Dept: OTHER | Facility: HOSPITAL | Age: 74
End: 2024-05-03
Payer: MEDICARE

## 2024-05-03 DIAGNOSIS — I48.19 ATRIAL FIBRILLATION, PERSISTENT: Primary | ICD-10-CM

## 2024-05-03 RX ORDER — ASPIRIN 81 MG/1
324 TABLET, CHEWABLE ORAL ONCE
OUTPATIENT
Start: 2024-05-03 | End: 2024-05-03

## 2024-05-03 RX ORDER — SODIUM CHLORIDE, SODIUM LACTATE, POTASSIUM CHLORIDE, CALCIUM CHLORIDE 600; 310; 30; 20 MG/100ML; MG/100ML; MG/100ML; MG/100ML
1 INJECTION, SOLUTION INTRAVENOUS CONTINUOUS
OUTPATIENT
Start: 2024-05-03

## 2024-05-03 RX ORDER — SODIUM CHLORIDE 0.9 % (FLUSH) 0.9 %
10 SYRINGE (ML) INJECTION AS NEEDED
OUTPATIENT
Start: 2024-05-03

## 2024-05-03 RX ORDER — SODIUM CHLORIDE 9 MG/ML
40 INJECTION, SOLUTION INTRAVENOUS AS NEEDED
OUTPATIENT
Start: 2024-05-03

## 2024-05-03 RX ORDER — SODIUM CHLORIDE 0.9 % (FLUSH) 0.9 %
10 SYRINGE (ML) INJECTION EVERY 12 HOURS SCHEDULED
OUTPATIENT
Start: 2024-05-03

## 2024-05-14 ENCOUNTER — HOSPITAL ENCOUNTER (INPATIENT)
Facility: HOSPITAL | Age: 74
LOS: 1 days | Discharge: HOME | DRG: 274 | End: 2024-05-14
Attending: INTERNAL MEDICINE | Admitting: INTERNAL MEDICINE
Payer: MEDICARE

## 2024-05-14 ENCOUNTER — ANESTHESIA (OUTPATIENT)
Dept: CARDIOLOGY | Facility: HOSPITAL | Age: 74
DRG: 274 | End: 2024-05-14
Payer: MEDICARE

## 2024-05-14 ENCOUNTER — APPOINTMENT (OUTPATIENT)
Dept: CARDIOLOGY | Facility: HOSPITAL | Age: 74
DRG: 274 | End: 2024-05-14
Payer: MEDICARE

## 2024-05-14 ENCOUNTER — ANESTHESIA EVENT (OUTPATIENT)
Dept: CARDIOLOGY | Facility: HOSPITAL | Age: 74
DRG: 274 | End: 2024-05-14
Payer: MEDICARE

## 2024-05-14 VITALS
TEMPERATURE: 97.6 F | BODY MASS INDEX: 35.19 KG/M2 | RESPIRATION RATE: 16 BRPM | DIASTOLIC BLOOD PRESSURE: 73 MMHG | OXYGEN SATURATION: 92 % | WEIGHT: 283 LBS | SYSTOLIC BLOOD PRESSURE: 145 MMHG | HEART RATE: 98 BPM | HEIGHT: 75 IN

## 2024-05-14 DIAGNOSIS — I48.19 ATRIAL FIBRILLATION, PERSISTENT: ICD-10-CM

## 2024-05-14 DIAGNOSIS — Z95.818 PRESENCE OF WATCHMAN LEFT ATRIAL APPENDAGE CLOSURE DEVICE: ICD-10-CM

## 2024-05-14 DIAGNOSIS — I48.19 PERSISTENT ATRIAL FIBRILLATION: Primary | ICD-10-CM

## 2024-05-14 LAB
ABO GROUP BLD: NORMAL
ACT BLD: 244 SECONDS (ref 82–152)
ALBUMIN SERPL-MCNC: 4.3 G/DL (ref 3.5–5.2)
ALBUMIN/GLOB SERPL: 1.8 G/DL
ALP SERPL-CCNC: 93 U/L (ref 39–117)
ALT SERPL W P-5'-P-CCNC: 11 U/L (ref 1–41)
ANION GAP SERPL CALCULATED.3IONS-SCNC: 8 MMOL/L (ref 5–15)
AST SERPL-CCNC: 11 U/L (ref 1–40)
BASOPHILS # BLD AUTO: 0.02 10*3/MM3 (ref 0–0.2)
BASOPHILS NFR BLD AUTO: 0.4 % (ref 0–1.5)
BILIRUB SERPL-MCNC: 0.4 MG/DL (ref 0–1.2)
BLD GP AB SCN SERPL QL: NEGATIVE
BUN SERPL-MCNC: 23 MG/DL (ref 8–23)
BUN/CREAT SERPL: 17.3 (ref 7–25)
CALCIUM SPEC-SCNC: 9.1 MG/DL (ref 8.6–10.5)
CHLORIDE SERPL-SCNC: 105 MMOL/L (ref 98–107)
CO2 SERPL-SCNC: 27 MMOL/L (ref 22–29)
CREAT SERPL-MCNC: 1.33 MG/DL (ref 0.76–1.27)
DEPRECATED RDW RBC AUTO: 46.7 FL (ref 37–54)
EGFRCR SERPLBLD CKD-EPI 2021: 56.8 ML/MIN/1.73
EOSINOPHIL # BLD AUTO: 0.23 10*3/MM3 (ref 0–0.4)
EOSINOPHIL NFR BLD AUTO: 4.5 % (ref 0.3–6.2)
ERYTHROCYTE [DISTWIDTH] IN BLOOD BY AUTOMATED COUNT: 14.1 % (ref 12.3–15.4)
GLOBULIN UR ELPH-MCNC: 2.4 GM/DL
GLUCOSE SERPL-MCNC: 134 MG/DL (ref 65–99)
HCT VFR BLD AUTO: 37.1 % (ref 37.5–51)
HGB BLD-MCNC: 11.5 G/DL (ref 13–17.7)
IMM GRANULOCYTES # BLD AUTO: 0.02 10*3/MM3 (ref 0–0.05)
IMM GRANULOCYTES NFR BLD AUTO: 0.4 % (ref 0–0.5)
INR PPP: 1.07 (ref 0.91–1.09)
LYMPHOCYTES # BLD AUTO: 1.38 10*3/MM3 (ref 0.7–3.1)
LYMPHOCYTES NFR BLD AUTO: 26.8 % (ref 19.6–45.3)
MCH RBC QN AUTO: 28.3 PG (ref 26.6–33)
MCHC RBC AUTO-ENTMCNC: 31 G/DL (ref 31.5–35.7)
MCV RBC AUTO: 91.4 FL (ref 79–97)
MONOCYTES # BLD AUTO: 0.67 10*3/MM3 (ref 0.1–0.9)
MONOCYTES NFR BLD AUTO: 13 % (ref 5–12)
NEUTROPHILS NFR BLD AUTO: 2.83 10*3/MM3 (ref 1.7–7)
NEUTROPHILS NFR BLD AUTO: 54.9 % (ref 42.7–76)
NRBC BLD AUTO-RTO: 0 /100 WBC (ref 0–0.2)
PLATELET # BLD AUTO: 157 10*3/MM3 (ref 140–450)
PMV BLD AUTO: 10.3 FL (ref 6–12)
POTASSIUM SERPL-SCNC: 4.2 MMOL/L (ref 3.5–5.2)
PROT SERPL-MCNC: 6.7 G/DL (ref 6–8.5)
PROTHROMBIN TIME: 14.3 SECONDS (ref 11.8–14.8)
RBC # BLD AUTO: 4.06 10*6/MM3 (ref 4.14–5.8)
RH BLD: NEGATIVE
SODIUM SERPL-SCNC: 140 MMOL/L (ref 136–145)
T&S EXPIRATION DATE: NORMAL
WBC NRBC COR # BLD AUTO: 5.15 10*3/MM3 (ref 3.4–10.8)

## 2024-05-14 PROCEDURE — 93355 ECHO TRANSESOPHAGEAL (TEE): CPT | Performed by: INTERNAL MEDICINE

## 2024-05-14 PROCEDURE — 85610 PROTHROMBIN TIME: CPT | Performed by: INTERNAL MEDICINE

## 2024-05-14 PROCEDURE — 86900 BLOOD TYPING SEROLOGIC ABO: CPT | Performed by: INTERNAL MEDICINE

## 2024-05-14 PROCEDURE — 25010000002 CEFAZOLIN PER 500 MG: Performed by: INTERNAL MEDICINE

## 2024-05-14 PROCEDURE — 80053 COMPREHEN METABOLIC PANEL: CPT | Performed by: INTERNAL MEDICINE

## 2024-05-14 PROCEDURE — 85025 COMPLETE CBC W/AUTO DIFF WBC: CPT | Performed by: INTERNAL MEDICINE

## 2024-05-14 PROCEDURE — 25510000001 IOPAMIDOL PER 1 ML: Performed by: INTERNAL MEDICINE

## 2024-05-14 PROCEDURE — 25010000002 SUGAMMADEX 200 MG/2ML SOLUTION: Performed by: NURSE ANESTHETIST, CERTIFIED REGISTERED

## 2024-05-14 PROCEDURE — C1889 IMPLANT/INSERT DEVICE, NOC: HCPCS | Performed by: INTERNAL MEDICINE

## 2024-05-14 PROCEDURE — B245ZZ4 ULTRASONOGRAPHY OF LEFT HEART, TRANSESOPHAGEAL: ICD-10-PCS | Performed by: INTERNAL MEDICINE

## 2024-05-14 PROCEDURE — C1894 INTRO/SHEATH, NON-LASER: HCPCS | Performed by: INTERNAL MEDICINE

## 2024-05-14 PROCEDURE — C1893 INTRO/SHEATH, FIXED,NON-PEEL: HCPCS | Performed by: INTERNAL MEDICINE

## 2024-05-14 PROCEDURE — 02L73DK OCCLUSION OF LEFT ATRIAL APPENDAGE WITH INTRALUMINAL DEVICE, PERCUTANEOUS APPROACH: ICD-10-PCS | Performed by: INTERNAL MEDICINE

## 2024-05-14 PROCEDURE — 25010000002 ONDANSETRON PER 1 MG: Performed by: NURSE ANESTHETIST, CERTIFIED REGISTERED

## 2024-05-14 PROCEDURE — 25010000002 HEPARIN (PORCINE) PER 1000 UNITS: Performed by: NURSE ANESTHETIST, CERTIFIED REGISTERED

## 2024-05-14 PROCEDURE — 93355 ECHO TRANSESOPHAGEAL (TEE): CPT

## 2024-05-14 PROCEDURE — 33340 PERQ CLSR TCAT L ATR APNDGE: CPT | Performed by: INTERNAL MEDICINE

## 2024-05-14 PROCEDURE — 25810000003 LACTATED RINGERS PER 1000 ML: Performed by: INTERNAL MEDICINE

## 2024-05-14 PROCEDURE — 86850 RBC ANTIBODY SCREEN: CPT | Performed by: INTERNAL MEDICINE

## 2024-05-14 PROCEDURE — 85347 COAGULATION TIME ACTIVATED: CPT

## 2024-05-14 PROCEDURE — 25010000002 DEXAMETHASONE PER 1 MG: Performed by: NURSE ANESTHETIST, CERTIFIED REGISTERED

## 2024-05-14 PROCEDURE — 25010000002 PROPOFOL 10 MG/ML EMULSION: Performed by: NURSE ANESTHETIST, CERTIFIED REGISTERED

## 2024-05-14 PROCEDURE — 86901 BLOOD TYPING SEROLOGIC RH(D): CPT | Performed by: INTERNAL MEDICINE

## 2024-05-14 DEVICE — LEFT ATRIAL APPENDAGE CLOSURE DEVICE WITH DELIVERY SYSTEM
Type: IMPLANTABLE DEVICE | Site: HEART | Status: FUNCTIONAL
Brand: WATCHMAN FLX™

## 2024-05-14 DEVICE — CAP SYS WATCHMAN TRUSEAL ACC PROC: Type: IMPLANTABLE DEVICE | Status: FUNCTIONAL

## 2024-05-14 DEVICE — CAP WATCHMAN FLX PROC: Type: IMPLANTABLE DEVICE | Status: FUNCTIONAL

## 2024-05-14 RX ORDER — ASPIRIN 81 MG/1
TABLET, CHEWABLE ORAL
Status: COMPLETED
Start: 2024-05-14 | End: 2024-05-14

## 2024-05-14 RX ORDER — ASPIRIN 81 MG/1
TABLET, CHEWABLE ORAL
Status: DISCONTINUED
Start: 2024-05-14 | End: 2024-05-14 | Stop reason: HOSPADM

## 2024-05-14 RX ORDER — ACETAMINOPHEN 325 MG/1
650 TABLET ORAL EVERY 4 HOURS PRN
Status: CANCELLED | OUTPATIENT
Start: 2024-05-14

## 2024-05-14 RX ORDER — LIDOCAINE HYDROCHLORIDE 20 MG/ML
INJECTION, SOLUTION EPIDURAL; INFILTRATION; INTRACAUDAL; PERINEURAL AS NEEDED
Status: DISCONTINUED | OUTPATIENT
Start: 2024-05-14 | End: 2024-05-14 | Stop reason: SURG

## 2024-05-14 RX ORDER — ASPIRIN 81 MG/1
324 TABLET, CHEWABLE ORAL ONCE
Status: COMPLETED | OUTPATIENT
Start: 2024-05-14 | End: 2024-05-14

## 2024-05-14 RX ORDER — SODIUM CHLORIDE 9 MG/ML
100 INJECTION, SOLUTION INTRAVENOUS CONTINUOUS
Status: CANCELLED | OUTPATIENT
Start: 2024-05-14 | End: 2024-05-14

## 2024-05-14 RX ORDER — SODIUM CHLORIDE, SODIUM LACTATE, POTASSIUM CHLORIDE, CALCIUM CHLORIDE 600; 310; 30; 20 MG/100ML; MG/100ML; MG/100ML; MG/100ML
1 INJECTION, SOLUTION INTRAVENOUS CONTINUOUS
Status: DISCONTINUED | OUTPATIENT
Start: 2024-05-14 | End: 2024-05-14 | Stop reason: HOSPADM

## 2024-05-14 RX ORDER — ONDANSETRON 2 MG/ML
INJECTION INTRAMUSCULAR; INTRAVENOUS AS NEEDED
Status: DISCONTINUED | OUTPATIENT
Start: 2024-05-14 | End: 2024-05-14 | Stop reason: SURG

## 2024-05-14 RX ORDER — BUPIVACAINE HCL/0.9 % NACL/PF 0.125 %
PLASTIC BAG, INJECTION (ML) EPIDURAL AS NEEDED
Status: DISCONTINUED | OUTPATIENT
Start: 2024-05-14 | End: 2024-05-14 | Stop reason: SURG

## 2024-05-14 RX ORDER — PROPOFOL 10 MG/ML
VIAL (ML) INTRAVENOUS AS NEEDED
Status: DISCONTINUED | OUTPATIENT
Start: 2024-05-14 | End: 2024-05-14 | Stop reason: SURG

## 2024-05-14 RX ORDER — ASPIRIN 81 MG/1
81 TABLET ORAL DAILY
Qty: 30 TABLET | Refills: 11 | Status: SHIPPED | OUTPATIENT
Start: 2024-05-14

## 2024-05-14 RX ORDER — CLOPIDOGREL BISULFATE 75 MG/1
75 TABLET ORAL DAILY
Qty: 90 TABLET | Refills: 1 | Status: SHIPPED | OUTPATIENT
Start: 2024-05-14

## 2024-05-14 RX ORDER — NITROGLYCERIN 0.4 MG/1
0.4 TABLET SUBLINGUAL
Status: CANCELLED | OUTPATIENT
Start: 2024-05-14

## 2024-05-14 RX ORDER — DEXAMETHASONE SODIUM PHOSPHATE 4 MG/ML
INJECTION, SOLUTION INTRA-ARTICULAR; INTRALESIONAL; INTRAMUSCULAR; INTRAVENOUS; SOFT TISSUE AS NEEDED
Status: DISCONTINUED | OUTPATIENT
Start: 2024-05-14 | End: 2024-05-14 | Stop reason: SURG

## 2024-05-14 RX ORDER — BUPIVACAINE HCL/0.9 % NACL/PF 0.1 %
2000 PLASTIC BAG, INJECTION (ML) EPIDURAL ONCE
Status: COMPLETED | OUTPATIENT
Start: 2024-05-14 | End: 2024-05-14

## 2024-05-14 RX ORDER — SODIUM CHLORIDE 9 MG/ML
40 INJECTION, SOLUTION INTRAVENOUS AS NEEDED
Status: DISCONTINUED | OUTPATIENT
Start: 2024-05-14 | End: 2024-05-14 | Stop reason: HOSPADM

## 2024-05-14 RX ORDER — HEPARIN SODIUM 1000 [USP'U]/ML
INJECTION, SOLUTION INTRAVENOUS; SUBCUTANEOUS AS NEEDED
Status: DISCONTINUED | OUTPATIENT
Start: 2024-05-14 | End: 2024-05-14 | Stop reason: SURG

## 2024-05-14 RX ORDER — ROCURONIUM BROMIDE 10 MG/ML
INJECTION, SOLUTION INTRAVENOUS AS NEEDED
Status: DISCONTINUED | OUTPATIENT
Start: 2024-05-14 | End: 2024-05-14 | Stop reason: SURG

## 2024-05-14 RX ORDER — SODIUM CHLORIDE 0.9 % (FLUSH) 0.9 %
10 SYRINGE (ML) INJECTION AS NEEDED
Status: DISCONTINUED | OUTPATIENT
Start: 2024-05-14 | End: 2024-05-14 | Stop reason: HOSPADM

## 2024-05-14 RX ORDER — SODIUM CHLORIDE 0.9 % (FLUSH) 0.9 %
10 SYRINGE (ML) INJECTION EVERY 12 HOURS SCHEDULED
Status: DISCONTINUED | OUTPATIENT
Start: 2024-05-14 | End: 2024-05-14 | Stop reason: HOSPADM

## 2024-05-14 RX ORDER — LIDOCAINE HYDROCHLORIDE 20 MG/ML
INJECTION, SOLUTION INFILTRATION; PERINEURAL
Status: DISCONTINUED | OUTPATIENT
Start: 2024-05-14 | End: 2024-05-14 | Stop reason: HOSPADM

## 2024-05-14 RX ADMIN — SUGAMMADEX 200 MG: 100 INJECTION, SOLUTION INTRAVENOUS at 08:52

## 2024-05-14 RX ADMIN — ASPIRIN 324 MG: 81 TABLET, CHEWABLE ORAL at 07:32

## 2024-05-14 RX ADMIN — Medication 100 MCG: at 08:32

## 2024-05-14 RX ADMIN — Medication 100 MCG: at 08:36

## 2024-05-14 RX ADMIN — ROCURONIUM BROMIDE 50 MG: 10 SOLUTION INTRAVENOUS at 07:57

## 2024-05-14 RX ADMIN — PROPOFOL 200 MG: 10 INJECTION, EMULSION INTRAVENOUS at 07:57

## 2024-05-14 RX ADMIN — Medication 2000 MG: at 08:02

## 2024-05-14 RX ADMIN — HEPARIN SODIUM 6000 UNITS: 1000 INJECTION, SOLUTION INTRAVENOUS; SUBCUTANEOUS at 08:23

## 2024-05-14 RX ADMIN — HEPARIN SODIUM 6000 UNITS: 1000 INJECTION, SOLUTION INTRAVENOUS; SUBCUTANEOUS at 08:26

## 2024-05-14 RX ADMIN — LIDOCAINE HYDROCHLORIDE 100 MG: 20 INJECTION, SOLUTION EPIDURAL; INFILTRATION; INTRACAUDAL; PERINEURAL at 07:57

## 2024-05-14 RX ADMIN — Medication 200 MCG: at 08:42

## 2024-05-14 RX ADMIN — ONDANSETRON 4 MG: 2 INJECTION INTRAMUSCULAR; INTRAVENOUS at 08:52

## 2024-05-14 RX ADMIN — SODIUM CHLORIDE, POTASSIUM CHLORIDE, SODIUM LACTATE AND CALCIUM CHLORIDE: 600; 310; 30; 20 INJECTION, SOLUTION INTRAVENOUS at 07:48

## 2024-05-14 RX ADMIN — DEXAMETHASONE SODIUM PHOSPHATE 4 MG: 4 INJECTION INTRA-ARTICULAR; INTRALESIONAL; INTRAMUSCULAR; INTRAVENOUS; SOFT TISSUE at 08:52

## 2024-05-14 NOTE — ANESTHESIA POSTPROCEDURE EVALUATION
Patient: Humberto Auguste Jr.    Procedure Summary       Date: 05/14/24 Room / Location: [facility] CATH LAB  /  [facility] CATH INVASIVE LOCATION    Anesthesia Start: 0748 Anesthesia Stop: 0912    Procedure: Atrial Appendage Occlusion (Right) Diagnosis: Atrial fibrillation, persistent    Providers: Aris Mayfield MD Provider: Shalini Schwarz CRNA    Anesthesia Type: general ASA Status: 3            Anesthesia Type: general    Vitals  Vitals Value Taken Time   /65 05/14/24 0931   Temp     Pulse 84 05/14/24 0945   Resp 18 05/14/24 0930   SpO2 93 % 05/14/24 0945   Vitals shown include unfiled device data.        Post Anesthesia Care and Evaluation    Patient location during evaluation: PHASE II  Patient participation: complete - patient participated  Level of consciousness: awake and alert  Pain management: adequate    Airway patency: patent  Anesthetic complications: No anesthetic complications  PONV Status: none  Cardiovascular status: acceptable  Respiratory status: acceptable  Hydration status: acceptable

## 2024-05-14 NOTE — ANESTHESIA PROCEDURE NOTES
Airway  Urgency: elective    Date/Time: 5/14/2024 7:59 AM  Airway not difficult    General Information and Staff    Patient location during procedure: OR  CRNA/CAA: Shalini Schwarz CRNA    Indications and Patient Condition  Indications for airway management: airway protection    Preoxygenated: yes  Mask difficulty assessment: 1 - vent by mask    Final Airway Details  Final airway type: endotracheal airway      Successful airway: ETT  Cuffed: yes   Successful intubation technique: direct laryngoscopy  Facilitating devices/methods: intubating stylet  Endotracheal tube insertion site: oral  Blade: Reyes  Blade size: 4 (3.5)  ETT size (mm): 7.5  Cormack-Lehane Classification: grade I - full view of glottis  Placement verified by: chest auscultation and capnometry   Cuff volume (mL): 8  Measured from: teeth  ETT/EBT  to teeth (cm): 23  Number of attempts at approach: 1  Assessment: lips, teeth, and gum same as pre-op and atraumatic intubation

## 2024-05-14 NOTE — Clinical Note
Prepped: subxiphoid process. Prepped with: ChloraPrep. The site was clipped. The patient was draped in a sterile fashion.

## 2024-05-14 NOTE — ANESTHESIA PREPROCEDURE EVALUATION
Anesthesia Evaluation     Patient summary reviewed   no history of anesthetic complications:   NPO Solid Status: > 8 hours  NPO Liquid Status: > 8 hours           Airway   Mallampati: II  TM distance: >3 FB  Neck ROM: full  No difficulty expected  Dental - normal exam     Pulmonary    (+) a smoker Former,sleep apnea  Cardiovascular   Exercise tolerance: good (4-7 METS) (Limited by post knee replacement recover, recently performed 12/11/23)    PT is on anticoagulation therapy    (+) hypertension well controlled, dysrhythmias Atrial Fib, hyperlipidemia    ROS comment: Echo:  ·  Left ventricular systolic function is normal. Left ventricular ejection fraction appears to be 56 - 60%.  ·  Estimated right ventricular systolic pressure from tricuspid regurgitation is normal (<35 mmHg).  ·  The right ventricular cavity is dilated (though not seen well enough to quantify severity of dilation), with normal systolic function.  ·  No significant (greater than mild) valvular pathology.  ·  Rhythm is atrial fibrillation.  ·  No prior studies available for comparison.      Neuro/Psych- negative ROS  (-) seizures, TIA, CVA  GI/Hepatic/Renal/Endo    (+) renal disease-, diabetes mellitus well controlled  (-) liver disease    Musculoskeletal     Abdominal    Substance History - negative use     OB/GYN          Other   arthritis, blood dyscrasia anemia,                     Anesthesia Plan    ASA 3     general     intravenous induction     Anesthetic plan, risks, benefits, and alternatives have been provided, discussed and informed consent has been obtained with: patient.    Plan discussed with CRNA.      CODE STATUS:

## 2024-06-26 ENCOUNTER — ANESTHESIA EVENT (OUTPATIENT)
Dept: CARDIOLOGY | Facility: HOSPITAL | Age: 74
End: 2024-06-26
Payer: MEDICARE

## 2024-06-26 ENCOUNTER — HOSPITAL ENCOUNTER (OUTPATIENT)
Dept: CARDIOLOGY | Facility: HOSPITAL | Age: 74
Discharge: HOME | End: 2024-06-26
Payer: MEDICARE

## 2024-06-26 ENCOUNTER — ANESTHESIA (OUTPATIENT)
Dept: CARDIOLOGY | Facility: HOSPITAL | Age: 74
End: 2024-06-26
Payer: MEDICARE

## 2024-06-26 VITALS
TEMPERATURE: 97.1 F | BODY MASS INDEX: 35.06 KG/M2 | RESPIRATION RATE: 16 BRPM | WEIGHT: 282 LBS | OXYGEN SATURATION: 97 % | DIASTOLIC BLOOD PRESSURE: 92 MMHG | HEIGHT: 75 IN | SYSTOLIC BLOOD PRESSURE: 149 MMHG | HEART RATE: 103 BPM

## 2024-06-26 DIAGNOSIS — Z95.818 PRESENCE OF WATCHMAN LEFT ATRIAL APPENDAGE CLOSURE DEVICE: ICD-10-CM

## 2024-06-26 DIAGNOSIS — I48.19 PERSISTENT ATRIAL FIBRILLATION: ICD-10-CM

## 2024-06-26 PROCEDURE — 93325 DOPPLER ECHO COLOR FLOW MAPG: CPT | Performed by: INTERNAL MEDICINE

## 2024-06-26 PROCEDURE — 25010000002 PROPOFOL 10 MG/ML EMULSION: Performed by: NURSE ANESTHETIST, CERTIFIED REGISTERED

## 2024-06-26 PROCEDURE — 93321 DOPPLER ECHO F-UP/LMTD STD: CPT | Performed by: INTERNAL MEDICINE

## 2024-06-26 PROCEDURE — 93312 ECHO TRANSESOPHAGEAL: CPT

## 2024-06-26 PROCEDURE — 93312 ECHO TRANSESOPHAGEAL: CPT | Performed by: INTERNAL MEDICINE

## 2024-06-26 PROCEDURE — 93321 DOPPLER ECHO F-UP/LMTD STD: CPT

## 2024-06-26 PROCEDURE — 93325 DOPPLER ECHO COLOR FLOW MAPG: CPT

## 2024-06-26 RX ORDER — LIDOCAINE HYDROCHLORIDE 20 MG/ML
INJECTION, SOLUTION EPIDURAL; INFILTRATION; INTRACAUDAL; PERINEURAL AS NEEDED
Status: DISCONTINUED | OUTPATIENT
Start: 2024-06-26 | End: 2024-06-26 | Stop reason: SURG

## 2024-06-26 RX ORDER — PROPOFOL 10 MG/ML
VIAL (ML) INTRAVENOUS AS NEEDED
Status: DISCONTINUED | OUTPATIENT
Start: 2024-06-26 | End: 2024-06-26 | Stop reason: SURG

## 2024-06-26 RX ORDER — DILTIAZEM HYDROCHLORIDE 240 MG/1
240 CAPSULE, COATED, EXTENDED RELEASE ORAL DAILY
Qty: 30 CAPSULE | Refills: 11 | Status: SHIPPED | OUTPATIENT
Start: 2024-06-26

## 2024-06-26 RX ORDER — FERROUS SULFATE 325(65) MG
325 TABLET ORAL
COMMUNITY

## 2024-06-26 RX ADMIN — PROPOFOL 50 MG: 10 INJECTION, EMULSION INTRAVENOUS at 10:00

## 2024-06-26 RX ADMIN — PROPOFOL 100 MG: 10 INJECTION, EMULSION INTRAVENOUS at 09:56

## 2024-06-26 RX ADMIN — LIDOCAINE HYDROCHLORIDE 100 MG: 20 INJECTION, SOLUTION EPIDURAL; INFILTRATION; INTRACAUDAL; PERINEURAL at 09:56

## 2024-06-26 RX ADMIN — PROPOFOL 50 MG: 10 INJECTION, EMULSION INTRAVENOUS at 09:58

## 2024-06-26 RX ADMIN — PROPOFOL 50 MG: 10 INJECTION, EMULSION INTRAVENOUS at 10:06

## 2024-06-26 NOTE — ANESTHESIA PREPROCEDURE EVALUATION
Anesthesia Evaluation     Patient summary reviewed   no history of anesthetic complications:   NPO Solid Status: > 8 hours  NPO Liquid Status: > 8 hours           Airway   Mallampati: II  TM distance: >3 FB  Neck ROM: full  No difficulty expected  Dental - normal exam     Pulmonary    (+) a smoker Former,sleep apnea  Cardiovascular   Exercise tolerance: good (4-7 METS) (Limited by post knee replacement recover, recently performed 12/11/23)    PT is on anticoagulation therapy    (+) hypertension well controlled, dysrhythmias Atrial Fib, hyperlipidemia    ROS comment: Echo:  ·  Left ventricular systolic function is normal. Left ventricular ejection fraction appears to be 56 - 60%.  ·  Estimated right ventricular systolic pressure from tricuspid regurgitation is normal (<35 mmHg).  ·  The right ventricular cavity is dilated (though not seen well enough to quantify severity of dilation), with normal systolic function.  ·  No significant (greater than mild) valvular pathology.  ·  Rhythm is atrial fibrillation.  ·  No prior studies available for comparison.      Neuro/Psych- negative ROS  (-) seizures, TIA, CVA  GI/Hepatic/Renal/Endo    (+) renal disease-, diabetes mellitus well controlled  (-) liver disease    Musculoskeletal     Abdominal    Substance History - negative use     OB/GYN          Other   arthritis, blood dyscrasia anemia,                     Anesthesia Plan    ASA 3     MAC     intravenous induction     Anesthetic plan, risks, benefits, and alternatives have been provided, discussed and informed consent has been obtained with: patient.    Plan discussed with CRNA.      CODE STATUS:

## 2024-06-26 NOTE — ANESTHESIA POSTPROCEDURE EVALUATION
Patient: Humberto Auguste Jr.    Procedure Summary       Date: 06/26/24 Room / Location: [facility] CATH LAB; [facility] CARDIOLOGY    Anesthesia Start: 0953 Anesthesia Stop: 1017    Procedure: ADULT TRANSESOPHAGEAL ECHO (MARYLU) W/ CONT IF NECESSARY PER PROTOCOL Diagnosis:       Persistent atrial fibrillation      Presence of Watchman left atrial appendage closure device      (Re-evaluation of Prior MARYLU for Interval Change)      (Arrhythmia)      (AFib)    Scheduled Providers: Aris Mayfield MD Provider: Reese Mcdonnell CRNA    Anesthesia Type: MAC ASA Status: 3            Anesthesia Type: MAC    Vitals  Vitals Value Taken Time   /69 06/26/24 1021   Temp     Pulse 118 06/26/24 1021   Resp 20 06/26/24 1015   SpO2 96 % 06/26/24 1021   Vitals shown include unfiled device data.        Post Anesthesia Care and Evaluation    Patient location during evaluation: PHASE II  Patient participation: complete - patient participated  Level of consciousness: awake and alert  Pain management: adequate    Airway patency: patent  Anesthetic complications: No anesthetic complications  PONV Status: none  Cardiovascular status: acceptable and stable  Respiratory status: acceptable and unassisted  Hydration status: acceptable

## 2024-07-09 ENCOUNTER — OFFICE VISIT (OUTPATIENT)
Dept: CARDIOLOGY | Facility: CLINIC | Age: 74
End: 2024-07-09
Payer: MEDICARE

## 2024-07-09 VITALS
DIASTOLIC BLOOD PRESSURE: 78 MMHG | HEART RATE: 76 BPM | WEIGHT: 284 LBS | RESPIRATION RATE: 18 BRPM | HEIGHT: 75 IN | OXYGEN SATURATION: 97 % | BODY MASS INDEX: 35.31 KG/M2 | SYSTOLIC BLOOD PRESSURE: 126 MMHG

## 2024-07-09 DIAGNOSIS — Z95.818 PRESENCE OF WATCHMAN LEFT ATRIAL APPENDAGE CLOSURE DEVICE: ICD-10-CM

## 2024-07-09 DIAGNOSIS — I48.19 ATRIAL FIBRILLATION, PERSISTENT: Primary | ICD-10-CM

## 2024-07-09 RX ORDER — DILTIAZEM HYDROCHLORIDE 240 MG/1
240 CAPSULE, COATED, EXTENDED RELEASE ORAL DAILY
Qty: 30 CAPSULE | Refills: 11 | Status: SHIPPED | OUTPATIENT
Start: 2024-07-09

## 2024-07-09 NOTE — PROGRESS NOTES
"[facility] -  CLINIC FOLLOW UP     Patient Care Team:  Feliciano Kinsey MD as PCP - General  Feliciano Kinsey MD as PCP - Family Medicine  Camron Bowden MD as Consulting Physician (Urology)    Chief Complaint: follow up cardioversion     Subjective   EP history:   Persistent AF  -7/19/2023 dx  -1/29/24: PVI  2. Typical atrial flutter   -1/29/24: CTI ablation   3. 24mm Flx Watchman 5/14/24: Dr. Mayfield      Cardiology history:   Hypertension  Hyperlipidemia     Medical history:  Hematuria  ROSCOE with CPAP   OA/chronic knee pain   Thyroid nodules   Diabetes Mellitus  CKD stage 3a  BPH     HPI: Today I had the pleasure of seeing Humberto Auguste Jr. in the cardiology clinic for follow up. He underwent successful ablation with Dr. Gotti 1/17/2024.  Due to recurrent symptomatic atrial fibrillation he did require a cardioversion on 2/26. Now, he has had watchman implant successfully. His only complaint is more bruising with DAPT but will eventually be able to go down to aspirin. Playing golf twice a week and he tolerates this best now since he's regularly in rhythm now.     He has not had any further afib that he knows of, but he was off of diltiazem for a couple of weeks due to prescription being denied. Now that he is back on it, he has been in regular rhythm and not excessively sweating like he was before when he was out of rhythm.        Objective     Visit Vitals  /78   Pulse 76   Resp 18   Ht 190.5 cm (75\")   Wt 129 kg (284 lb)   SpO2 97%   BMI 35.50 kg/m²           Vitals reviewed.   Constitutional:       Appearance: Healthy appearance. Not in distress.   Eyes:      Extraocular Movements: Extraocular movements intact.      Conjunctiva/sclera: Conjunctivae normal.      Pupils: Pupils are equal, round, and reactive to light.   HENT:      Head: Normocephalic and atraumatic.      Nose: Nose normal.    Mouth/Throat:      Lips: Pink.      Mouth: Mucous membranes are moist.    "   Pharynx: Oropharynx is clear.   Neck:      Vascular: No carotid bruit or JVD. JVD normal.   Pulmonary:      Effort: Pulmonary effort is normal.      Breath sounds: Normal breath sounds.   Chest:      Chest wall: Not tender to palpatation.   Cardiovascular:      PMI at left midclavicular line. Normal rate. Regular rhythm. Normal S1. Normal S2.       Murmurs: There is no murmur.      No gallop.  No rub.   Pulses:     Radial: 2+ bilaterally.  Edema:     Peripheral edema present.     Pretibial: bilateral 2+ edema of the pretibial area.  Abdominal:      General: Bowel sounds are normal.      Palpations: Abdomen is soft.   Musculoskeletal: Normal range of motion.      Extremities: No clubbing present.     Cervical back: Normal range of motion. Skin:     General: Skin is warm and dry.   Neurological:      General: No focal deficit present.      Mental Status: Alert and oriented to person, place, and time.   Psychiatric:         Attention and Perception: Attention normal.         Mood and Affect: Affect normal.         Speech: Speech normal.         Behavior: Behavior normal.         Cognition and Memory: Cognition normal.             The following portions of the patient's history were reviewed and updated as appropriate: allergies, current medications, past medical history, past social history, past and problem list.     Review of Systems   Constitutional: Negative.    HENT: Negative.     Eyes: Negative.    Respiratory: Negative.     Cardiovascular: Negative.    Gastrointestinal: Negative.    Endocrine: Negative.    Genitourinary: Negative.    Musculoskeletal: Negative.    Skin: Negative.    Allergic/Immunologic: Negative.    Neurological: Negative.    Hematological: Negative.    Psychiatric/Behavioral: Negative.                  ECG 12 Lead    Date/Time: 7/9/2024 11:42 AM  Performed by: Rebecca Gustafson PA    Authorized by: Rebecca Gustafson PA  Comparison: compared with previous ECG from 2/2/2024  Rhythm: sinus  rhythm  Ectopy: atrial premature contractions  Rate: normal  BPM: 76  QRS axis: normal and right    Clinical impression: abnormal EKG            Medication Review: yes    Current Outpatient Medications:     aspirin 81 MG EC tablet, Take 1 tablet by mouth Daily., Disp: 30 tablet, Rfl: 11    clopidogrel (PLAVIX) 75 MG tablet, Take 1 tablet by mouth Daily., Disp: 90 tablet, Rfl: 1    Cod Liver Oil 1000 MG capsule, Take 2 capsules by mouth Daily., Disp: , Rfl:     dilTIAZem CD (Cartia XT) 240 MG 24 hr capsule, Take 1 capsule by mouth Daily., Disp: 30 capsule, Rfl: 11    ferrous sulfate 325 (65 FE) MG tablet, Take 1 tablet by mouth Daily With Breakfast., Disp: , Rfl:     furosemide (LASIX) 40 MG tablet, Take 1 tablet by mouth Daily As Needed., Disp: , Rfl:     lisinopril (PRINIVIL,ZESTRIL) 40 MG tablet, Take 1 tablet by mouth Daily., Disp: , Rfl:     metFORMIN (GLUCOPHAGE) 500 MG tablet, Take 1 tablet by mouth 2 (Two) Times a Day With Meals., Disp: , Rfl:     sildenafil (VIAGRA) 100 MG tablet, Take 0.5 tablets by mouth Take As Directed. 1-4 hours before sexual activity, Disp: , Rfl:     simvastatin (ZOCOR) 40 MG tablet, Take 1 tablet by mouth Every Night., Disp: , Rfl:     tamsulosin (FLOMAX) 0.4 MG capsule 24 hr capsule, Take 2 capsules by mouth Daily., Disp: , Rfl:    Allergies   Allergen Reactions    Percocet [Oxycodone-Acetaminophen] Nausea Only       I have reviewed       CBC:  Lab Results - Last 18 Months   Lab Units 05/14/24  0707 11/22/23  0945 10/11/23  0838   WBC 10*3/mm3 5.15   < >  --    HEMOGLOBIN g/dL 11.5*   < >  --    HEMATOCRIT % 37.1*   < >  --    PLATELETS 10*3/mm3 157   < >  --    IRON ug/dL  --   --  74    < > = values in this interval not displayed.      BMP/CMP:  Lab Results - Last 18 Months   Lab Units 05/14/24  0707   SODIUM mmol/L 140   POTASSIUM mmol/L 4.2   CHLORIDE mmol/L 105   CO2 mmol/L 27.0   GLUCOSE mg/dL 134*   BUN mg/dL 23   CREATININE mg/dL 1.33*   CALCIUM mg/dL 9.1     BNP: No results  "for input(s): \"PROBNP\" in the last 25259 hours.   THYROID:  Lab Results - Last 18 Months   Lab Units 06/05/23  1305   TSH uIU/mL 0.869   FREE T4 ng/dL 1.19       Results for orders placed during the hospital encounter of 06/26/24    Adult Transesophageal Echo (MARYLU) W/ Cont if Necessary Per Protocol    Interpretation Summary    24 mm Watchman FLX device in appropriate position, fully covering the ostium of the left atrial appendage, with no evidence of device related thrombus nor any flow through or around the device into the appendage.    Left ventricular systolic function is normal.    No significant aortic or mitral valve pathology.    Limited study, to assess for the above.     Assessment:   Diagnoses and all orders for this visit:    1. Atrial fibrillation, persistent (Primary)    2. Presence of Watchman left atrial appendage closure device  Overview:  24mm Watchman FLX ([place], 5/14/24)  F/u MARYLU 6/26/24 - good result      Other orders  -     ECG 12 Lead    Persistent atrial fibrillation: s/p PVI and CTI ablations in Feb. Now on DAPT after Watchman implanted by Dr. Mayfield. EKG today shows SR with PACs. Doing very well after ablation 6 months ago.   -Follow up in 6 months with EP clinic.           I spent 20 minutes caring for Humberto on this date of service. This time includes time spent by me in the following activities:preparing for the visit, reviewing tests, obtaining and/or reviewing a separately obtained history, performing a medically appropriate examination and/or evaluation , counseling and educating the patient/family/caregiver, ordering medications, tests, or procedures, referring and communicating with other health care professionals , documenting information in the medical record, and independently interpreting results and communicating that information with the patient/family/caregiver        Electronically signed by CARLTON Gomez    "

## 2024-07-10 RX ORDER — DILTIAZEM HYDROCHLORIDE 240 MG/1
240 CAPSULE, COATED, EXTENDED RELEASE ORAL DAILY
Qty: 90 CAPSULE | Refills: 3 | Status: SHIPPED | OUTPATIENT
Start: 2024-07-10

## 2024-07-22 DIAGNOSIS — N52.9 ERECTILE DYSFUNCTION, UNSPECIFIED ERECTILE DYSFUNCTION TYPE: ICD-10-CM

## 2024-07-22 RX ORDER — SILDENAFIL 100 MG/1
TABLET, FILM COATED ORAL
Qty: 30 TABLET | Refills: 1 | Status: SHIPPED | OUTPATIENT
Start: 2024-07-22

## 2024-07-29 NOTE — PROGRESS NOTES
Chief Complaint  Prostate Cancer    Subjective          Humberto Auguste Jr. presents to [facility] UROLOGY to follow-up multiple urologic issues as follows:  -BPH with obstruction: He underwent transurethral section of the prostate 2 and half years ago.  Incidental finding of favorable intermediate risk prostate cancer noted.  He was in 5% of the specimen.  We have continued to follow him.    TURP Op Note by Camron Bowden MD (01/28/2022 07:17)--incidental finding Martha grade 3+4 = 7 adenocarcinoma prostate on TURP. He was only present 5% of the specimen.   TUI BNCx Op Note by Camron Bowden MD (05/29/2023 20:45)   -Prostate cancer: Incidental finding on TURP as described above.  He has intermediate risk disease.  Staging would be pT1a.    -Erectile dysfunction: He also has erectile dysfunction that appears to be age-related as well as probably some vascular disease.  He does use the 100 mg dose of sildenafil.  Patient is on Plavix.  This is working well for him.            Current Outpatient Medications:     aspirin 81 MG EC tablet, Take 1 tablet by mouth Daily., Disp: 30 tablet, Rfl: 11    clopidogrel (PLAVIX) 75 MG tablet, Take 1 tablet by mouth Daily., Disp: 90 tablet, Rfl: 1    COD LIVER OIL PO, Take 2 capsules by mouth Daily. Takes 415mg, Disp: , Rfl:     dilTIAZem CD (Cartia XT) 240 MG 24 hr capsule, Take 1 capsule by mouth Daily., Disp: 30 capsule, Rfl: 11    ferrous sulfate 325 (65 FE) MG tablet, Take 1 tablet by mouth Daily With Breakfast., Disp: , Rfl:     furosemide (LASIX) 40 MG tablet, Take 1 tablet by mouth Daily As Needed., Disp: , Rfl:     lisinopril (PRINIVIL,ZESTRIL) 40 MG tablet, Take 1 tablet by mouth Daily., Disp: , Rfl:     metFORMIN (GLUCOPHAGE) 500 MG tablet, Take 1 tablet by mouth 2 (Two) Times a Day With Meals., Disp: , Rfl:     sildenafil (VIAGRA) 100 MG tablet, TAKE 1 TABLET BY MOUTH 1 TO 4 HOURS BEFORE SEXUAL ACTIVITY, Disp: 30 tablet, Rfl: 1     simvastatin (ZOCOR) 40 MG tablet, Take 1 tablet by mouth Every Night., Disp: , Rfl:     tamsulosin (FLOMAX) 0.4 MG capsule 24 hr capsule, Take 2 capsules by mouth Daily., Disp: , Rfl:   Past Medical History:   Diagnosis Date    Acute UTI (urinary tract infection) 5/29/2023    Arthritis     Benign prostatic hyperplasia 2/1/12    Diabetes mellitus     Elevated cholesterol     Enlarged prostate     Erectile dysfunction 2/1/16    Hypertension     Rotator cuff disorder, right     Sleep apnea      Past Surgical History:   Procedure Laterality Date    ATRIAL APPENDAGE EXCLUSION LEFT WITH TRANSESOPHAGEAL ECHOCARDIOGRAM Right 5/14/2024    Procedure: Atrial Appendage Occlusion;  Surgeon: Aris Mayfield MD;  Location: [facility] CATH INVASIVE LOCATION;  Service: Cardiology;  Laterality: Right;    CARDIAC ELECTROPHYSIOLOGY PROCEDURE Left 01/17/2024    Procedure: Ablation atrial fibrillation;  Surgeon: Yisel Gotti MD;  Location: [facility] CATH INVASIVE LOCATION;  Service: Cardiovascular;  Laterality: Left;    COLONOSCOPY  06/02/2016    polyp removed from 95 cm.a long [id number] colon not allowing mitchell safr colonoscopy    COLONOSCOPY N/A 06/08/2021    Procedure: COLONOSCOPY WITH ANESTHESIA;  Surgeon: Rick Dixon DO;  Location: [facility] ENDOSCOPY;  Service: Gastroenterology;  Laterality: N/A;  pre op: hx polyps  post op:normal  PCP: Feliciano Kinsey MD    CYSTOSCOPY TRANSURETHRAL RESECTION OF PROSTATE N/A 01/28/2022    Procedure: TRANSURETHRAL RESECTION OF PROSTATE;  Surgeon: Camron Bowden MD;  Location: [facility] OR;  Service: Urology;  Laterality: N/A;    CYSTOSCOPY WITH CLOT EVACUATION N/A 05/29/2023    Procedure: CYSTOSCOPY WITH CLOT EVACUATION;  Surgeon: Camron Bowden MD;  Location: [facility] OR;  Service: Urology;  Laterality: N/A;    HERNIA REPAIR      PROSTATE SURGERY  2/1/22    REPLACEMENT TOTAL KNEE      SHOULDER ARTHROSCOPY W/ ROTATOR CUFF REPAIR Right 03/17/2020    Procedure: RIGHT SHOULDER  ROTATOR CUFF  "REPAIR, SUBACROMIAL DECOMPRESSION;  Surgeon: Alphonso Lundberg MD;  Location: [facility];  Service: Orthopedics;  Laterality: Right;    VARICOCELE EXCISION  2/15/82    VARICOSE VEIN SURGERY             Review of Systems      Objective   PHYSICAL EXAM  Vital Signs:   Temp 98 °F (36.7 °C)   Ht 185.4 cm (73\")   Wt 129 kg (284 lb)   BMI 37.47 kg/m²     Physical Exam      DATA  Result Review :              Results for orders placed or performed in visit on 08/08/24   POC Urinalysis Dipstick, Multipro    Specimen: Urine   Result Value Ref Range    Color Yellow Yellow, Straw, Dark Yellow, Amy    Clarity, UA Clear Clear    Glucose, UA Negative Negative mg/dL    Bilirubin Negative Negative    Ketones, UA Negative Negative    Specific Gravity  1.020 1.005 - 1.030    Blood, UA Negative Negative    pH, Urine 6.0 5.0 - 8.0    Protein, POC Negative Negative mg/dL    Urobilinogen, UA 0.2 E.U./dL Normal, 0.2 E.U./dL    Nitrite, UA Negative Negative    Leukocytes Negative Negative             Lab Results   Component Value Date    PSA 0.650 08/01/2024    PSA 0.49 04/19/2024    PSA 0.433 02/01/2024                    ASSESSMENT AND PLAN          Problem List Items Addressed This Visit          Genitourinary and Reproductive     Benign non-nodular prostatic hyperplasia with lower urinary tract symptoms    Overview     Added automatically from request for surgery [id number]          Other Visit Diagnoses       Prostate cancer    -  Primary    Relevant Orders    POC Urinalysis Dipstick, Multipro (Completed)    PSA Diagnostic (Completed)    Erectile dysfunction, unspecified erectile dysfunction type            Each of these chronic Urologic conditions, which I have followed >1 year,  were evaluated and managed today as follows:     -His lower urinary tract symptoms were assessed today and found to be stable.  -PSA does remain low.  Digital rectal exam is not suspicious.  Continue watchful waiting for his prostate cancer.  -ED is " stable.  Continue as needed sildenafil          FOLLOW UP     Return in about 1 year (around 8/8/2025) for PSA before visit.        (Please note that portions of this note were completed with a voice recognition program.)  Camron Bowden MD  08/08/24  13:31 CDT

## 2024-08-01 ENCOUNTER — LAB (OUTPATIENT)
Dept: LAB | Facility: HOSPITAL | Age: 74
End: 2024-08-01
Payer: MEDICARE

## 2024-08-01 DIAGNOSIS — C61 PROSTATE CANCER: ICD-10-CM

## 2024-08-01 LAB — PSA SERPL-MCNC: 0.65 NG/ML (ref 0–4)

## 2024-08-01 PROCEDURE — 84153 ASSAY OF PSA TOTAL: CPT

## 2024-08-01 PROCEDURE — 36415 COLL VENOUS BLD VENIPUNCTURE: CPT

## 2024-08-08 ENCOUNTER — OFFICE VISIT (OUTPATIENT)
Dept: UROLOGY | Facility: CLINIC | Age: 74
End: 2024-08-08
Payer: MEDICARE

## 2024-08-08 VITALS — HEIGHT: 73 IN | TEMPERATURE: 98 F | BODY MASS INDEX: 37.64 KG/M2 | WEIGHT: 284 LBS

## 2024-08-08 DIAGNOSIS — N52.9 ERECTILE DYSFUNCTION, UNSPECIFIED ERECTILE DYSFUNCTION TYPE: ICD-10-CM

## 2024-08-08 DIAGNOSIS — N40.1 BENIGN NON-NODULAR PROSTATIC HYPERPLASIA WITH LOWER URINARY TRACT SYMPTOMS: ICD-10-CM

## 2024-08-08 DIAGNOSIS — C61 PROSTATE CANCER: Primary | ICD-10-CM

## 2024-11-01 ENCOUNTER — PATIENT MESSAGE (OUTPATIENT)
Dept: CARDIOLOGY | Facility: CLINIC | Age: 74
End: 2024-11-01
Payer: MEDICARE

## 2024-11-01 NOTE — TELEPHONE ENCOUNTER
Call to the pt to let him know that he does not have to renew his Plavix Rx. He will be 6 months post Watchman on the 14th and has enough plavix until 11/6. He was instructed to continue his baby ASA daily. He verbalized understanding.

## 2024-11-13 ENCOUNTER — OFFICE VISIT (OUTPATIENT)
Dept: CARDIOLOGY | Facility: CLINIC | Age: 74
End: 2024-11-13
Payer: MEDICARE

## 2024-11-13 VITALS
SYSTOLIC BLOOD PRESSURE: 130 MMHG | HEART RATE: 71 BPM | DIASTOLIC BLOOD PRESSURE: 60 MMHG | BODY MASS INDEX: 37.64 KG/M2 | WEIGHT: 284 LBS | OXYGEN SATURATION: 97 % | HEIGHT: 73 IN

## 2024-11-13 DIAGNOSIS — I48.19 ATRIAL FIBRILLATION, PERSISTENT: Primary | ICD-10-CM

## 2024-11-13 DIAGNOSIS — I10 ESSENTIAL (PRIMARY) HYPERTENSION: ICD-10-CM

## 2024-11-13 DIAGNOSIS — E78.01 FAMILIAL HYPERCHOLESTEROLEMIA: ICD-10-CM

## 2024-11-13 DIAGNOSIS — Z95.818 PRESENCE OF WATCHMAN LEFT ATRIAL APPENDAGE CLOSURE DEVICE: ICD-10-CM

## 2024-11-13 PROBLEM — Z79.01 CHRONIC ANTICOAGULATION: Status: RESOLVED | Noted: 2023-08-25 | Resolved: 2024-11-13

## 2024-11-13 PROCEDURE — 3075F SYST BP GE 130 - 139MM HG: CPT | Performed by: INTERNAL MEDICINE

## 2024-11-13 PROCEDURE — 1159F MED LIST DOCD IN RCRD: CPT | Performed by: INTERNAL MEDICINE

## 2024-11-13 PROCEDURE — 99214 OFFICE O/P EST MOD 30 MIN: CPT | Performed by: INTERNAL MEDICINE

## 2024-11-13 PROCEDURE — 93000 ELECTROCARDIOGRAM COMPLETE: CPT | Performed by: INTERNAL MEDICINE

## 2024-11-13 PROCEDURE — 1160F RVW MEDS BY RX/DR IN RCRD: CPT | Performed by: INTERNAL MEDICINE

## 2024-11-13 PROCEDURE — 3078F DIAST BP <80 MM HG: CPT | Performed by: INTERNAL MEDICINE

## 2024-11-13 NOTE — PROGRESS NOTES
Subjective:     Encounter Date:11/13/2024      Patient ID: Humberto Auguste Jr. is a 74 y.o. male.    Chief Complaint: Follow-up atrial fibrillation  History of Present Illness  The patient is here today for a follow-up visit concerning his atrial fibrillation. His last visit was for a transesophageal echocardiogram (MARYLU) to assess the successful implantation of a 24 mm Watchman FLX device on 04/14/2024. Today's visit is a routine follow-up.    He reports feeling in the best health since 05/2023. He underwent a transurethral resection of the prostate (TURP) in 2022, during which he experienced atrial fibrillation. His current medication includes baby aspirin.  He has also undergone successful ablation with Dr. Gotti.    He has resumed his exercise routine, which he finds easier now than before due to the absence of knee pain. He has also been dealing with plantar fasciitis.      The following portions of the patient's history were reviewed and updated as appropriate: allergies, current medications, past family history, past medical history, past social history, past surgical history, and problem list.    Review of Systems   Constitutional: Negative for malaise/fatigue.   Cardiovascular:  Negative for chest pain, claudication, dyspnea on exertion, leg swelling, near-syncope, orthopnea, palpitations, paroxysmal nocturnal dyspnea and syncope.   Respiratory:  Negative for shortness of breath.    Hematologic/Lymphatic: Does not bruise/bleed easily.           Current Outpatient Medications:     aspirin 81 MG EC tablet, Take 1 tablet by mouth Daily., Disp: 30 tablet, Rfl: 11    COD LIVER OIL PO, Take 2 capsules by mouth Daily. Takes 415mg, Disp: , Rfl:     dilTIAZem CD (Cartia XT) 240 MG 24 hr capsule, Take 1 capsule by mouth Daily., Disp: 30 capsule, Rfl: 11    ferrous sulfate 325 (65 FE) MG tablet, Take 1 tablet by mouth Daily With Breakfast., Disp: , Rfl:     furosemide (LASIX) 40 MG tablet, Take 1 tablet by  mouth Daily As Needed., Disp: , Rfl:     lisinopril (PRINIVIL,ZESTRIL) 40 MG tablet, Take 1 tablet by mouth Daily., Disp: , Rfl:     metFORMIN (GLUCOPHAGE) 500 MG tablet, Take 1 tablet by mouth 2 (Two) Times a Day With Meals., Disp: , Rfl:     sildenafil (VIAGRA) 100 MG tablet, TAKE 1 TABLET BY MOUTH 1 TO 4 HOURS BEFORE SEXUAL ACTIVITY, Disp: 30 tablet, Rfl: 1    simvastatin (ZOCOR) 40 MG tablet, Take 1 tablet by mouth Every Night., Disp: , Rfl:     tamsulosin (FLOMAX) 0.4 MG capsule 24 hr capsule, Take 2 capsules by mouth Daily., Disp: , Rfl:        Objective:      Vitals:    11/13/24 1454   BP: 130/60   Pulse: 71   SpO2: 97%     Vitals and nursing note reviewed.   Constitutional:       General: Not in acute distress.     Appearance: Not in distress.   Neck:      Vascular: No JVD or JVR. JVD normal.   Pulmonary:      Effort: Pulmonary effort is normal.      Breath sounds: Normal breath sounds.   Cardiovascular:      Normal rate. Regular rhythm.      Murmurs: There is no murmur.      No gallop.  No rub.   Pulses:     Intact distal pulses.   Edema:     Peripheral edema absent.   Skin:     General: Skin is warm and dry.   Neurological:      Mental Status: Alert, oriented to person, place, and time and oriented to person, place and time.       Physical Exam      Lab Review:         ECG 12 Lead    Date/Time: 11/13/2024 3:06 PM  Performed by: Aris Mayfield MD    Authorized by: Aris Mayfield MD  Comparison: compared with previous ECG from 7/9/2024  Comparison to previous ECG: PACs are no longer present and borderline criteria for inferior infarct is no longer evident  Rhythm: sinus rhythm  Rate: normal  BPM: 71  QRS axis: normal    Clinical impression: normal ECG        Results        Results for orders placed during the hospital encounter of 06/26/24    Adult Transesophageal Echo (MARYLU) W/ Cont if Necessary Per Protocol    Interpretation Summary    24 mm Watchman FLX device in appropriate position, fully covering  the ostium of the left atrial appendage, with no evidence of device related thrombus nor any flow through or around the device into the appendage.    Left ventricular systolic function is normal.    No significant aortic or mitral valve pathology.    Limited study, to assess for the above.        Assessment/Plan:     Problem List Items Addressed This Visit (all established and stable)         Cardiac and Vasculature    Essential (primary) hypertension    Familial hypercholesterolemia    Atrial fibrillation, persistent - Primary    Overview     PVI ([facility]) Jan '24  Watchman 24mm FLX ([place]) Apr '24         Relevant Orders    ECG 12 Lead    Presence of Watchman left atrial appendage closure device    Overview     24mm Watchman FLX ([place], 5/14/24)  F/u MARYLU 6/26/24 - good result              Recommendations/plans:    Assessment & Plan    He is overall doing very well, remaining in sinus rhythm now and on antiplatelet monotherapy per usual post-watchman protocol. No other symptoms are observed requiring further investigation at present. He is advised that if he does not develop other symptoms requiring further cardiac investigation, he would probably not need to continue following with both EP and self, and can choose to see one service or the other for annual follow-up. In the meantime, he should continue taking aspirin.    Follow-up  Return in 1 year for follow up.      Aris Mayfield MD  11/13/2024  17:32 CST    Transcribed from ambient dictation for Aris Mayfield MD by Aris Mayfield MD.  11/13/24   15:35 CST    Patient or patient representative verbalized consent to the visit recording.

## 2025-01-14 ENCOUNTER — OFFICE VISIT (OUTPATIENT)
Dept: CARDIOLOGY | Facility: CLINIC | Age: 75
End: 2025-01-14
Payer: MEDICARE

## 2025-01-14 VITALS
DIASTOLIC BLOOD PRESSURE: 68 MMHG | HEART RATE: 82 BPM | HEIGHT: 73 IN | SYSTOLIC BLOOD PRESSURE: 128 MMHG | WEIGHT: 286 LBS | OXYGEN SATURATION: 98 % | BODY MASS INDEX: 37.91 KG/M2

## 2025-01-14 DIAGNOSIS — G47.33 OBSTRUCTIVE SLEEP APNEA: ICD-10-CM

## 2025-01-14 DIAGNOSIS — Z95.818 PRESENCE OF WATCHMAN LEFT ATRIAL APPENDAGE CLOSURE DEVICE: ICD-10-CM

## 2025-01-14 DIAGNOSIS — I48.19 ATRIAL FIBRILLATION, PERSISTENT: Primary | ICD-10-CM

## 2025-01-14 PROCEDURE — 3078F DIAST BP <80 MM HG: CPT | Performed by: PHYSICIAN ASSISTANT

## 2025-01-14 PROCEDURE — 3074F SYST BP LT 130 MM HG: CPT | Performed by: PHYSICIAN ASSISTANT

## 2025-01-14 PROCEDURE — 99214 OFFICE O/P EST MOD 30 MIN: CPT | Performed by: PHYSICIAN ASSISTANT

## 2025-01-14 PROCEDURE — 93000 ELECTROCARDIOGRAM COMPLETE: CPT | Performed by: PHYSICIAN ASSISTANT

## 2025-01-14 NOTE — PROGRESS NOTES
"[facility] -  CLINIC FOLLOW UP     Patient Care Team:  Feliciano Kinsey MD as PCP - General  Feliciano Kinsey MD as PCP - Family Medicine  Camron Bowden MD as Consulting Physician (Urology)    Chief Complaint: follow up to afib     Subjective   EP history:   Persistent AF  -7/19/2023 dx  -1/29/24: PVI  -2/27/24: DCCV  2. Typical atrial flutter   -1/29/24: CTI ablation   3. 24mm Flx Watchman 5/14/24: Dr. Mayfield      Cardiology history:   Hypertension  Hyperlipidemia     Medical history:  Hematuria  ROSCOE with CPAP   OA/chronic knee pain   Thyroid nodules   Diabetes Mellitus  CKD stage 3a  BPH     HPI: Today I had the pleasure of seeing Humberto Eduin Auguste Jr. in the cardiology clinic for follow up.  He underwent successful ablation with Dr. Gotti 1/17/2024.  Due to recurrent symptomatic atrial fibrillation he did require a cardioversion on 2/26. Now, he has had watchman implant successfully. He is now on aspirin only and very pleased with how things have turned out. He is playing golf twice a week and he tolerates this best now since he's regularly in rhythm now.     Objective     Visit Vitals  /68   Pulse 82   Ht 185.4 cm (72.99\")   Wt 130 kg (286 lb)   SpO2 98%   BMI 37.74 kg/m²           Vitals reviewed.   Constitutional:       Appearance: Not in distress. Obese.   Eyes:      Extraocular Movements: Extraocular movements intact.      Conjunctiva/sclera: Conjunctivae normal.   HENT:      Head: Normocephalic and atraumatic.      Nose: Nose normal.    Mouth/Throat:      Lips: Pink.      Mouth: Mucous membranes are moist.      Pharynx: Oropharynx is clear.   Neck:      Vascular: No carotid bruit or JVD. JVD normal.   Pulmonary:      Effort: Pulmonary effort is normal.      Breath sounds: Normal breath sounds.   Chest:      Chest wall: Not tender to palpatation.   Cardiovascular:      PMI at left midclavicular line. Normal rate. Regular rhythm. Normal S1. Normal S2.       " Murmurs: There is no murmur.      No gallop.  No rub.   Pulses:     Radial: 2+ bilaterally.  Edema:     Peripheral edema absent.   Musculoskeletal:      Extremities: No clubbing present.     Cervical back: Normal range of motion. Skin:     General: Skin is warm and dry.   Neurological:      General: No focal deficit present.      Mental Status: Alert and oriented to person, place, and time.   Psychiatric:         Attention and Perception: Attention normal.         Mood and Affect: Affect normal.         Speech: Speech normal.         Behavior: Behavior normal.         Cognition and Memory: Cognition normal.           The following portions of the patient's history were reviewed and updated as appropriate: allergies, current medications, past medical history, past social history, past and problem list.     Review of Systems   Constitutional: Negative.    HENT: Negative.     Eyes: Negative.    Respiratory: Negative.     Cardiovascular: Negative.    Gastrointestinal: Negative.    Endocrine: Negative.    Genitourinary: Negative.    Musculoskeletal: Negative.    Skin: Negative.    Allergic/Immunologic: Negative.    Neurological: Negative.    Hematological: Negative.    Psychiatric/Behavioral: Negative.                ECG 12 Lead    Date/Time: 1/14/2025 2:00 PM  Performed by: Rebecca Gustafson PA    Authorized by: Rebecca Gustafson PA  Comparison: compared with previous ECG from 11/13/2024  Rhythm: sinus rhythm  Rate: normal  BPM: 74  QRS axis: normal    Clinical impression: abnormal EKG          Medication Review: yes    Current Outpatient Medications:     aspirin 81 MG EC tablet, Take 1 tablet by mouth Daily., Disp: 30 tablet, Rfl: 11    COD LIVER OIL PO, Take 2 capsules by mouth Daily. Takes 415mg, Disp: , Rfl:     dilTIAZem CD (Cartia XT) 240 MG 24 hr capsule, Take 1 capsule by mouth Daily., Disp: 30 capsule, Rfl: 11    ferrous sulfate 325 (65 FE) MG tablet, Take 1 tablet by mouth Daily With Breakfast., Disp: , Rfl:      furosemide (LASIX) 40 MG tablet, Take 1 tablet by mouth Daily As Needed., Disp: , Rfl:     lisinopril (PRINIVIL,ZESTRIL) 40 MG tablet, Take 1 tablet by mouth Daily., Disp: , Rfl:     metFORMIN (GLUCOPHAGE) 500 MG tablet, Take 1 tablet by mouth 2 (Two) Times a Day With Meals., Disp: , Rfl:     sildenafil (VIAGRA) 100 MG tablet, TAKE 1 TABLET BY MOUTH 1 TO 4 HOURS BEFORE SEXUAL ACTIVITY, Disp: 30 tablet, Rfl: 1    simvastatin (ZOCOR) 40 MG tablet, Take 1 tablet by mouth Every Night., Disp: , Rfl:     tamsulosin (FLOMAX) 0.4 MG capsule 24 hr capsule, Take 2 capsules by mouth Daily., Disp: , Rfl:    Allergies   Allergen Reactions    Percocet [Oxycodone-Acetaminophen] Nausea Only       I have reviewed       CBC:  Lab Results - Last 18 Months   Lab Units 10/24/24  0957 07/24/24  1023   WBC K/uL 5.7 4.7*   HEMOGLOBIN g/dL 12.6* 12.1*   HEMATOCRIT % 40.5* 38.3*   PLATELETS K/uL 188 174   IRON ug/dL  --  86      BMP/CMP:  Lab Results - Last 18 Months   Lab Units 10/24/24  0957   SODIUM mmol/L 142   POTASSIUM mmol/L 4.4   CHLORIDE mmol/L 105   TOTAL CO2 mmol/L 29   GLUCOSE mg/dL 127*   BUN mg/dL 23   CREATININE mg/dL 1.1   CALCIUM mg/dL 9.5         Results for orders placed during the hospital encounter of 06/26/24    Adult Transesophageal Echo (MARYLU) W/ Cont if Necessary Per Protocol    Interpretation Summary    24 mm Watchman FLX device in appropriate position, fully covering the ostium of the left atrial appendage, with no evidence of device related thrombus nor any flow through or around the device into the appendage.    Left ventricular systolic function is normal.    No significant aortic or mitral valve pathology.    Limited study, to assess for the above.     Assessment:   Diagnoses and all orders for this visit:    1. Atrial fibrillation, persistent (Primary)  Overview:  PVI ([facility]) Jan '24  Watchman 24mm FLX ([place]) Apr '24      2. Presence of Watchman left atrial appendage closure device  Overview:  24mm Watchman  FLX ([place], 5/14/24)  F/u MARYLU 6/26/24 - good result      3. Obstructive sleep apnea    Other orders  -     ECG 12 Lead    Persistent afib: s/p PVI in Jan 2024. Now s/p Watchman and on aspirin only. He denies any recurrent afib to his knowledge and is quite pleased with his outcomes.   -Follow up in 1 year with EP     I spent 30 minutes caring for Humberto on this date of service. This time includes time spent by me in the following activities:preparing for the visit, reviewing tests, obtaining and/or reviewing a separately obtained history, performing a medically appropriate examination and/or evaluation , counseling and educating the patient/family/caregiver, ordering medications, tests, or procedures, referring and communicating with other health care professionals , documenting information in the medical record, and independently interpreting results and communicating that information with the patient/family/caregiver        Electronically signed by CARLTON Gomez

## 2025-03-05 NOTE — ED NOTES
Detail Level: Detailed Pt had approx 1000mL urine output from Law Catheter.   Add 19544 Cpt? (Important Note: In 2017 The Use Of 07993 Is Being Tracked By Cms To Determine Future Global Period Reimbursement For Global Periods): no

## 2025-05-27 ENCOUNTER — PREP FOR SURGERY (OUTPATIENT)
Dept: OTHER | Facility: HOSPITAL | Age: 75
End: 2025-05-27
Payer: MEDICARE

## 2025-05-27 ENCOUNTER — TELEPHONE (OUTPATIENT)
Dept: CARDIOLOGY | Facility: CLINIC | Age: 75
End: 2025-05-27
Payer: MEDICARE

## 2025-05-27 DIAGNOSIS — I48.19 ATRIAL FIBRILLATION, PERSISTENT: Primary | ICD-10-CM

## 2025-05-27 RX ORDER — SODIUM CHLORIDE 0.9 % (FLUSH) 0.9 %
10 SYRINGE (ML) INJECTION AS NEEDED
OUTPATIENT
Start: 2025-05-27

## 2025-05-27 RX ORDER — SODIUM CHLORIDE 9 MG/ML
40 INJECTION, SOLUTION INTRAVENOUS AS NEEDED
OUTPATIENT
Start: 2025-05-27

## 2025-05-27 RX ORDER — SODIUM CHLORIDE 0.9 % (FLUSH) 0.9 %
10 SYRINGE (ML) INJECTION EVERY 12 HOURS SCHEDULED
OUTPATIENT
Start: 2025-05-27

## 2025-06-19 ENCOUNTER — ANESTHESIA EVENT (OUTPATIENT)
Dept: CARDIOLOGY | Facility: HOSPITAL | Age: 75
End: 2025-06-19
Payer: MEDICARE

## 2025-06-19 ENCOUNTER — ANESTHESIA (OUTPATIENT)
Dept: CARDIOLOGY | Facility: HOSPITAL | Age: 75
End: 2025-06-19
Payer: MEDICARE

## 2025-06-19 ENCOUNTER — HOSPITAL ENCOUNTER (OUTPATIENT)
Dept: CARDIOLOGY | Facility: HOSPITAL | Age: 75
Discharge: HOME | End: 2025-06-19
Payer: MEDICARE

## 2025-06-19 VITALS
OXYGEN SATURATION: 97 % | DIASTOLIC BLOOD PRESSURE: 68 MMHG | RESPIRATION RATE: 16 BRPM | SYSTOLIC BLOOD PRESSURE: 145 MMHG | TEMPERATURE: 97.5 F | HEART RATE: 58 BPM

## 2025-06-19 VITALS — HEART RATE: 66 BPM | SYSTOLIC BLOOD PRESSURE: 138 MMHG | OXYGEN SATURATION: 97 % | DIASTOLIC BLOOD PRESSURE: 64 MMHG

## 2025-06-19 DIAGNOSIS — I48.19 ATRIAL FIBRILLATION, PERSISTENT: ICD-10-CM

## 2025-06-19 PROCEDURE — 25010000002 PROPOFOL 10 MG/ML EMULSION: Performed by: NURSE ANESTHETIST, CERTIFIED REGISTERED

## 2025-06-19 PROCEDURE — 93312 ECHO TRANSESOPHAGEAL: CPT

## 2025-06-19 PROCEDURE — 93325 DOPPLER ECHO COLOR FLOW MAPG: CPT

## 2025-06-19 PROCEDURE — 25810000003 SODIUM CHLORIDE 0.9 % SOLUTION: Performed by: NURSE ANESTHETIST, CERTIFIED REGISTERED

## 2025-06-19 RX ORDER — SODIUM CHLORIDE 0.9 % (FLUSH) 0.9 %
10 SYRINGE (ML) INJECTION EVERY 12 HOURS SCHEDULED
Status: DISCONTINUED | OUTPATIENT
Start: 2025-06-19 | End: 2025-06-20 | Stop reason: HOSPADM

## 2025-06-19 RX ORDER — PROPOFOL 10 MG/ML
VIAL (ML) INTRAVENOUS AS NEEDED
Status: DISCONTINUED | OUTPATIENT
Start: 2025-06-19 | End: 2025-06-19 | Stop reason: SURG

## 2025-06-19 RX ORDER — SODIUM CHLORIDE 0.9 % (FLUSH) 0.9 %
10 SYRINGE (ML) INJECTION AS NEEDED
Status: DISCONTINUED | OUTPATIENT
Start: 2025-06-19 | End: 2025-06-20 | Stop reason: HOSPADM

## 2025-06-19 RX ORDER — SODIUM CHLORIDE 9 MG/ML
INJECTION, SOLUTION INTRAVENOUS CONTINUOUS PRN
Status: DISCONTINUED | OUTPATIENT
Start: 2025-06-19 | End: 2025-06-19 | Stop reason: SURG

## 2025-06-19 RX ORDER — SODIUM CHLORIDE 9 MG/ML
40 INJECTION, SOLUTION INTRAVENOUS AS NEEDED
Status: DISCONTINUED | OUTPATIENT
Start: 2025-06-19 | End: 2025-06-20 | Stop reason: HOSPADM

## 2025-06-19 RX ADMIN — PROPOFOL 50 MG: 10 INJECTION, EMULSION INTRAVENOUS at 11:35

## 2025-06-19 RX ADMIN — PROPOFOL 50 MG: 10 INJECTION, EMULSION INTRAVENOUS at 11:40

## 2025-06-19 RX ADMIN — SODIUM CHLORIDE: 9 INJECTION, SOLUTION INTRAVENOUS at 11:36

## 2025-06-19 RX ADMIN — PROPOFOL 50 MG: 10 INJECTION, EMULSION INTRAVENOUS at 11:36

## 2025-06-19 RX ADMIN — PROPOFOL 50 MG: 10 INJECTION, EMULSION INTRAVENOUS at 11:38

## 2025-06-19 NOTE — ANESTHESIA PREPROCEDURE EVALUATION
Anesthesia Evaluation     Patient summary reviewed   no history of anesthetic complications:   NPO Solid Status: > 8 hours  NPO Liquid Status: > 8 hours           Airway   Mallampati: II  TM distance: >3 FB  Neck ROM: full  No difficulty expected  Dental - normal exam     Pulmonary - normal exam   (+) a smoker Former,sleep apnea  Cardiovascular - normal exam  Exercise tolerance: good (4-7 METS)    PT is on anticoagulation therapy    (+) hypertension well controlled, dysrhythmias Atrial Fib, hyperlipidemia    ROS comment: Echo:  ·  Left ventricular systolic function is normal. Left ventricular ejection fraction appears to be 56 - 60%.  ·  Estimated right ventricular systolic pressure from tricuspid regurgitation is normal (<35 mmHg).  ·  The right ventricular cavity is dilated (though not seen well enough to quantify severity of dilation), with normal systolic function.  ·  No significant (greater than mild) valvular pathology.  ·  Rhythm is atrial fibrillation.  ·  No prior studies available for comparison.      Neuro/Psych- negative ROS  (-) seizures, TIA, CVA  GI/Hepatic/Renal/Endo    (+) renal disease-, diabetes mellitus well controlled  (-) liver disease    Musculoskeletal     Abdominal   (+) obese   Substance History - negative use     OB/GYN          Other   arthritis, blood dyscrasia anemia,                     Anesthesia Plan    ASA 3     MAC     intravenous induction     Anesthetic plan, risks, benefits, and alternatives have been provided, discussed and informed consent has been obtained with: patient.    Plan discussed with CRNA.      CODE STATUS:

## 2025-06-19 NOTE — ANESTHESIA POSTPROCEDURE EVALUATION
Patient: Humberto Auguste Jr.    Procedure Summary       Date: 06/19/25 Room / Location: [facility] CATH LAB; [facility]    Anesthesia Start: 1135 Anesthesia Stop: 1148    Procedure: ADULT TRANSESOPHAGEAL ECHO (MARYLU) W/ CONT IF NECESSARY PER PROTOCOL Diagnosis:       Atrial fibrillation, persistent      (Re-evaluation of Prior MARYLU for Interval Change)    Scheduled Providers: Aris Mayfield MD Provider: Alejandra Thomas CRNA    Anesthesia Type: MAC ASA Status: 3            Anesthesia Type: MAC    Vitals  Vitals Value Taken Time   /64 06/19/25 11:48   Temp     Pulse 66 06/19/25 11:48   Resp 0 06/19/25 11:48   SpO2 97 % 06/19/25 11:48           Post Anesthesia Care and Evaluation    Patient location during evaluation: PHASE II  Patient participation: complete - patient participated  Level of consciousness: awake and alert  Pain score: 0  Pain management: adequate    Airway patency: patent  Anesthetic complications: No anesthetic complications  PONV Status: none  Cardiovascular status: acceptable  Respiratory status: acceptable  Hydration status: acceptable  No anesthesia care post op

## 2025-07-16 DIAGNOSIS — I48.19 ATRIAL FIBRILLATION, PERSISTENT: ICD-10-CM

## 2025-07-16 RX ORDER — DILTIAZEM HYDROCHLORIDE 240 MG/1
240 CAPSULE, COATED, EXTENDED RELEASE ORAL DAILY
Qty: 90 CAPSULE | Refills: 3 | Status: SHIPPED | OUTPATIENT
Start: 2025-07-16

## 2025-08-12 ENCOUNTER — TELEPHONE (OUTPATIENT)
Dept: UROLOGY | Facility: CLINIC | Age: 75
End: 2025-08-12
Payer: MEDICARE

## 2025-08-12 ENCOUNTER — LAB (OUTPATIENT)
Dept: LAB | Facility: HOSPITAL | Age: 75
End: 2025-08-12
Payer: MEDICARE

## 2025-08-12 DIAGNOSIS — C61 PROSTATE CANCER: ICD-10-CM

## 2025-08-12 PROCEDURE — 84153 ASSAY OF PSA TOTAL: CPT

## 2025-08-12 PROCEDURE — 36415 COLL VENOUS BLD VENIPUNCTURE: CPT

## 2025-08-13 LAB — PSA SERPL-MCNC: 0.9 NG/ML (ref 0–4)

## 2025-08-19 ENCOUNTER — OFFICE VISIT (OUTPATIENT)
Dept: UROLOGY | Facility: CLINIC | Age: 75
End: 2025-08-19
Payer: MEDICARE

## 2025-08-19 VITALS — HEIGHT: 74 IN | TEMPERATURE: 97.6 F | WEIGHT: 286.4 LBS | BODY MASS INDEX: 36.76 KG/M2

## 2025-08-19 DIAGNOSIS — N32.0 ACQUIRED CONTRACTURE OF BLADDER NECK: ICD-10-CM

## 2025-08-19 DIAGNOSIS — C61 PROSTATE CANCER: Primary | ICD-10-CM

## 2025-08-19 LAB
BILIRUB BLD-MCNC: NEGATIVE MG/DL
CLARITY, POC: CLEAR
COLOR UR: YELLOW
GLUCOSE UR STRIP-MCNC: ABNORMAL MG/DL
KETONES UR QL: NEGATIVE
LEUKOCYTE EST, POC: NEGATIVE
NITRITE UR-MCNC: NEGATIVE MG/ML
PH UR: 5.5 [PH] (ref 5–8)
PROT UR STRIP-MCNC: NEGATIVE MG/DL
RBC # UR STRIP: NEGATIVE /UL
SP GR UR: 1.01 (ref 1–1.03)
UROBILINOGEN UR QL: ABNORMAL

## (undated) DEVICE — KT NDL GUIDE STRL 18GA

## (undated) DEVICE — GLV SURG BIOGEL LTX PF 8

## (undated) DEVICE — TBG SMPL FLTR LINE NASL 02/C02 A/ BX/100

## (undated) DEVICE — BNDG GZ SOF-FORM CONFRM 2X75IN LF STRL

## (undated) DEVICE — SYR LUERLOK 50ML

## (undated) DEVICE — BHS TURNOVER CYSTO: Brand: MEDLINE INDUSTRIES, INC.

## (undated) DEVICE — DRSNG SURESITE WNDW 4X4.5

## (undated) DEVICE — ELECTRD SUPERSECT FRNT LOAD 5PK

## (undated) DEVICE — GOWN,NON-REINFORCED,SIRUS,SET IN SLV,XXL: Brand: MEDLINE

## (undated) DEVICE — SOLIDIFIER LIQUI LOC PLUS 2000CC

## (undated) DEVICE — SYS COL WAST NAMIC IV SGL/LN FML/FIT W/VNT/SPK/HD 72IN

## (undated) DEVICE — PK CATH CARD 30 CA/4

## (undated) DEVICE — SENSR O2 OXIMAX FNGR A/ 18IN NONSTR

## (undated) DEVICE — MASK,OXYGEN,MED CONC,ADLT,7' TUB, UC: Brand: PENDING

## (undated) DEVICE — THE CHANNEL CLEANING BRUSH IS A NYLON FLEXI BRUSH ATTACHED TO A FLEXIBLE PLASTIC SHEATH DESIGNED TO SAFELY REMOVE DEBRIS FROM FLEXIBLE ENDOSCOPES.

## (undated) DEVICE — GLV SURG DERMASSURE GRN LF PF 8.0

## (undated) DEVICE — PINNACLE INTRODUCER SHEATH: Brand: PINNACLE

## (undated) DEVICE — INTRO STEER AGILIS NXT MED/CURL 8.5F

## (undated) DEVICE — BALLOON DILATATION CATHETER KIT: Brand: UROMAX ULTRA KIT

## (undated) DEVICE — SYS CLS VASC/VENI VASCADE MVP 6TO12F

## (undated) DEVICE — [TOMCAT CUTTER, ARTHROSCOPIC SHAVER BLADE,  DO NOT RESTERILIZE,  DO NOT USE IF PACKAGE IS DAMAGED,  KEEP DRY,  KEEP AWAY FROM SUNLIGHT]: Brand: FORMULA

## (undated) DEVICE — ACCESS SHEATH WITH DILATOR: Brand: WATCHMAN FXD CURVE™ ACCESS SYSTEM

## (undated) DEVICE — CATH NAV PENTARAY ECO 7F2/6/2 D/CRV 115

## (undated) DEVICE — ADHS SKIN PREMIERPRO EXOFIN TOPICAL HI/VISC .5ML

## (undated) DEVICE — TBG PRESS/MONITR FIX M/F LL A/ 48IN STRL

## (undated) DEVICE — GLV SURG BIOGEL M LTX PF 8

## (undated) DEVICE — ADAPT CYSTO FOR SYR TO SHEATH

## (undated) DEVICE — YANKAUER,BULB TIP WITH VENT: Brand: ARGYLE

## (undated) DEVICE — ST FLD IRR WARM

## (undated) DEVICE — ELECTRD PAD DEFIB RADOLCNT M/FUNC PHYSIOCONTRL CONN/LD/IN A/

## (undated) DEVICE — SHEATH INTRO AVANTI PLS STD 8F 11CM

## (undated) DEVICE — EVAC BLDR UROVAC W ADAPT

## (undated) DEVICE — TP NDL SCORPION MULTIFIRE

## (undated) DEVICE — PK SHLDR 30

## (undated) DEVICE — SUCTION MAT (LOW PROFILE), 50X34: Brand: NEPTUNE

## (undated) DEVICE — INTEGRATED CASSETTE TUBING, DO NOT USE IF PACKAGE IS DAMAGED: Brand: CROSSFLOW

## (undated) DEVICE — 4-PORT MANIFOLD: Brand: NEPTUNE 2

## (undated) DEVICE — PK TURNOVER CYSTO RM

## (undated) DEVICE — SOL IRR NACL 0.9PCT BT 1000ML

## (undated) DEVICE — PK CYSTO 30

## (undated) DEVICE — SHEATH INTRO AVANTI PLS STD 7F 11CM

## (undated) DEVICE — Device

## (undated) DEVICE — 3M™ IOBAN™ 2 ANTIMICROBIAL INCISE DRAPE 6650EZ: Brand: IOBAN™ 2

## (undated) DEVICE — DRAPE,ANGIO,BRACH,STERILE,38X44: Brand: MEDLINE

## (undated) DEVICE — CUTTING ELECTRODE MONO 22FR 12/30°  FOR RESECTOSCOPES, TELESCOPE Ø 4 MM, FOR SHEATHS, CONTINUOUS IRRIGATION, 22/24 FR, LOOP: ROUND, WIRE Ø 0.35 MM, FORK COLOR GREEN, STEM COLOR TRANSPARENT, PACK = 10 PCS, FOR SHARK RESECTOSCOPE, STERILE, FOR SINGLE USE: Brand: SHARK

## (undated) DEVICE — CANN TWST IN W/NOSQUIRT CAP 7IDX7CM

## (undated) DEVICE — PAD GRND SURFIT 2 W/CORD A/ 10FT DISP

## (undated) DEVICE — CATH DIAG EZ STEER C/S AUTO/ID FJ 7FR

## (undated) DEVICE — BAG,DRAINAGE,4L,A/R TOWER,LL,SLIDE TAP: Brand: MEDLINE

## (undated) DEVICE — 1 X VERSACROSS CONNECT TRANSSEPTAL DILATOR (INCLUDING 1 X J-TIP MECHANICAL GUIDEWIRE); 1 X VERSACROSS RF WIRE (INCLUDING 1 X CONNECTOR CABLE (SINGLE USE)); 1 X DISPERSIVE ELECTRODE: Brand: VERSACROSS CONNECT LAAC ACCESS SOLUTION

## (undated) DEVICE — SUT MONOCRYL PLS ANTIB UND 3/0  PS1 27IN

## (undated) DEVICE — BUTN RESECT IGLESIUS 5PK 1P/U

## (undated) DEVICE — Device: Brand: DEFENDO AIR/WATER/SUCTION AND BIOPSY VALVE

## (undated) DEVICE — PTCH MP CARTO3 EXT REF

## (undated) DEVICE — BUR BRL FORMLA 6FLUT 5MM

## (undated) DEVICE — DRSNG SURESITE123 6X8IN

## (undated) DEVICE — CATH ULTRASND SOUNDSTAR ECO 3D GE 10F 90CM

## (undated) DEVICE — DOVER HYDROGEL COATED LATEX FOLEY CATHETER, 5 ML, 3-WAY 22 FR/CH (7.3 MM): Brand: DOVER

## (undated) DEVICE — CATH F6 ST+ MP A1 100CM: Brand: SUPER TORQUE

## (undated) DEVICE — TBG CONN IRR SMARTABLATE PUMP 1P/U

## (undated) DEVICE — 3M™ STERI-DRAPE™ U-DRAPE 1015: Brand: STERI-DRAPE™

## (undated) DEVICE — PAD, DEFIB, ADULT, RADIOTRANS, PHYSIO: Brand: MEDLINE

## (undated) DEVICE — DOVER HYDROGEL COATED LATEX FOLEY CATHETER, 30 ML, 3-WAY 22 FR/CH (7.3 MM): Brand: DOVER

## (undated) DEVICE — CATH F6INF PIG 145 110CM 6SH: Brand: INFINITI

## (undated) DEVICE — SHEATH INTRO AVANTI PLS STD 10F 11CM

## (undated) DEVICE — PROB ABLAT SERFAS ENERGY MAX 90S 4

## (undated) DEVICE — SLV CBL IVL 5X96IN

## (undated) DEVICE — SYRINGE,PISTON,IRRIGATION,60ML,STERILE: Brand: MEDLINE

## (undated) DEVICE — NDL TRNSEP BRK XS LNG 18G 98CM A/

## (undated) DEVICE — DRSNG GZ PETROLTM CURAD 3X9IN STRL

## (undated) DEVICE — Device: Brand: MEDEX

## (undated) DEVICE — PK TURNOVER RM ADV

## (undated) DEVICE — PAD,EYE,1-5/8X2 5/8,STERILE,LF,1/PK: Brand: MEDLINE

## (undated) DEVICE — 3M™ STERI-STRIP™ REINFORCED ADHESIVE SKIN CLOSURES, R1547, 1/2 IN X 4 IN (12 MM X 100 MM), 6 STRIPS/ENVELOPE: Brand: 3M™ STERI-STRIP™

## (undated) DEVICE — CANN NASL ETCO2 LO/FLO A/